# Patient Record
Sex: FEMALE | Race: WHITE | Employment: FULL TIME | URBAN - METROPOLITAN AREA
[De-identification: names, ages, dates, MRNs, and addresses within clinical notes are randomized per-mention and may not be internally consistent; named-entity substitution may affect disease eponyms.]

---

## 2018-05-04 ENCOUNTER — HOSPITAL ENCOUNTER (EMERGENCY)
Age: 53
Discharge: LEFT BEFORE DISCHARGE | End: 2018-05-04
Attending: EMERGENCY MEDICINE

## 2018-05-04 ENCOUNTER — APPOINTMENT (OUTPATIENT)
Dept: CT IMAGING | Age: 53
End: 2018-05-04

## 2018-05-04 VITALS
SYSTOLIC BLOOD PRESSURE: 146 MMHG | WEIGHT: 160 LBS | OXYGEN SATURATION: 99 % | TEMPERATURE: 98.2 F | RESPIRATION RATE: 16 BRPM | BODY MASS INDEX: 24.25 KG/M2 | HEART RATE: 84 BPM | HEIGHT: 68 IN | DIASTOLIC BLOOD PRESSURE: 70 MMHG

## 2018-05-04 DIAGNOSIS — F10.10 ALCOHOL ABUSE, DAILY USE: ICD-10-CM

## 2018-05-04 DIAGNOSIS — R11.2 NAUSEA AND VOMITING, INTRACTABILITY OF VOMITING NOT SPECIFIED, UNSPECIFIED VOMITING TYPE: ICD-10-CM

## 2018-05-04 DIAGNOSIS — R25.2 BILATERAL LEG CRAMPS: ICD-10-CM

## 2018-05-04 DIAGNOSIS — M47.812 OSTEOARTHRITIS OF CERVICAL SPINE, UNSPECIFIED SPINAL OSTEOARTHRITIS COMPLICATION STATUS: ICD-10-CM

## 2018-05-04 DIAGNOSIS — E87.1 HYPONATREMIA: ICD-10-CM

## 2018-05-04 DIAGNOSIS — S13.9XXA CERVICAL SPRAIN, INITIAL ENCOUNTER: Primary | ICD-10-CM

## 2018-05-04 DIAGNOSIS — R51.9 NONINTRACTABLE HEADACHE, UNSPECIFIED CHRONICITY PATTERN, UNSPECIFIED HEADACHE TYPE: ICD-10-CM

## 2018-05-04 LAB
ALBUMIN SERPL-MCNC: 4.8 G/DL (ref 3.5–5.2)
ALP BLD-CCNC: 121 U/L (ref 35–104)
ALT SERPL-CCNC: 77 U/L (ref 0–32)
ANION GAP SERPL CALCULATED.3IONS-SCNC: 19 MMOL/L (ref 7–16)
AST SERPL-CCNC: 75 U/L (ref 0–31)
BASOPHILS ABSOLUTE: 0.02 E9/L (ref 0–0.2)
BASOPHILS RELATIVE PERCENT: 0.2 % (ref 0–2)
BILIRUB SERPL-MCNC: 1 MG/DL (ref 0–1.2)
BUN BLDV-MCNC: 15 MG/DL (ref 6–20)
CALCIUM SERPL-MCNC: 10.4 MG/DL (ref 8.6–10.2)
CHLORIDE BLD-SCNC: 74 MMOL/L (ref 98–107)
CO2: 26 MMOL/L (ref 22–29)
CREAT SERPL-MCNC: 1 MG/DL (ref 0.5–1)
EKG ATRIAL RATE: 77 BPM
EKG P AXIS: -9 DEGREES
EKG P-R INTERVAL: 180 MS
EKG Q-T INTERVAL: 392 MS
EKG QRS DURATION: 90 MS
EKG QTC CALCULATION (BAZETT): 443 MS
EKG R AXIS: 9 DEGREES
EKG T AXIS: 2 DEGREES
EKG VENTRICULAR RATE: 77 BPM
EOSINOPHILS ABSOLUTE: 0.01 E9/L (ref 0.05–0.5)
EOSINOPHILS RELATIVE PERCENT: 0.1 % (ref 0–6)
GFR AFRICAN AMERICAN: >60
GFR NON-AFRICAN AMERICAN: 58 ML/MIN/1.73
GLUCOSE BLD-MCNC: 124 MG/DL (ref 74–109)
HCT VFR BLD CALC: 32.6 % (ref 34–48)
HEMOGLOBIN: 11.8 G/DL (ref 11.5–15.5)
IMMATURE GRANULOCYTES #: 0.13 E9/L
IMMATURE GRANULOCYTES %: 1 % (ref 0–5)
LIPASE: 42 U/L (ref 13–60)
LYMPHOCYTES ABSOLUTE: 0.49 E9/L (ref 1.5–4)
LYMPHOCYTES RELATIVE PERCENT: 3.9 % (ref 20–42)
MCH RBC QN AUTO: 30 PG (ref 26–35)
MCHC RBC AUTO-ENTMCNC: 36.2 % (ref 32–34.5)
MCV RBC AUTO: 83 FL (ref 80–99.9)
MONOCYTES ABSOLUTE: 1.02 E9/L (ref 0.1–0.95)
MONOCYTES RELATIVE PERCENT: 8.1 % (ref 2–12)
NEUTROPHILS ABSOLUTE: 10.95 E9/L (ref 1.8–7.3)
NEUTROPHILS RELATIVE PERCENT: 86.7 % (ref 43–80)
PDW BLD-RTO: 12 FL (ref 11.5–15)
PLATELET # BLD: 227 E9/L (ref 130–450)
PMV BLD AUTO: 8.8 FL (ref 7–12)
POTASSIUM SERPL-SCNC: 4.1 MMOL/L (ref 3.5–5)
RBC # BLD: 3.93 E12/L (ref 3.5–5.5)
SODIUM BLD-SCNC: 119 MMOL/L (ref 132–146)
TOTAL PROTEIN: 8.6 G/DL (ref 6.4–8.3)
WBC # BLD: 12.6 E9/L (ref 4.5–11.5)

## 2018-05-04 PROCEDURE — 96372 THER/PROPH/DIAG INJ SC/IM: CPT

## 2018-05-04 PROCEDURE — 99284 EMERGENCY DEPT VISIT MOD MDM: CPT

## 2018-05-04 PROCEDURE — 72125 CT NECK SPINE W/O DYE: CPT

## 2018-05-04 PROCEDURE — 96374 THER/PROPH/DIAG INJ IV PUSH: CPT

## 2018-05-04 PROCEDURE — 6360000002 HC RX W HCPCS: Performed by: NURSE PRACTITIONER

## 2018-05-04 PROCEDURE — 80053 COMPREHEN METABOLIC PANEL: CPT

## 2018-05-04 PROCEDURE — 83690 ASSAY OF LIPASE: CPT

## 2018-05-04 PROCEDURE — 2580000003 HC RX 258: Performed by: NURSE PRACTITIONER

## 2018-05-04 PROCEDURE — 36415 COLL VENOUS BLD VENIPUNCTURE: CPT

## 2018-05-04 PROCEDURE — 96375 TX/PRO/DX INJ NEW DRUG ADDON: CPT

## 2018-05-04 PROCEDURE — 70450 CT HEAD/BRAIN W/O DYE: CPT

## 2018-05-04 PROCEDURE — 85025 COMPLETE CBC W/AUTO DIFF WBC: CPT

## 2018-05-04 RX ORDER — THIAMINE HYDROCHLORIDE 100 MG/ML
100 INJECTION, SOLUTION INTRAMUSCULAR; INTRAVENOUS DAILY
Status: DISCONTINUED | OUTPATIENT
Start: 2018-05-04 | End: 2018-05-04 | Stop reason: HOSPADM

## 2018-05-04 RX ORDER — DIPHENHYDRAMINE HYDROCHLORIDE 50 MG/ML
25 INJECTION INTRAMUSCULAR; INTRAVENOUS ONCE
Status: COMPLETED | OUTPATIENT
Start: 2018-05-04 | End: 2018-05-04

## 2018-05-04 RX ORDER — 0.9 % SODIUM CHLORIDE 0.9 %
1000 INTRAVENOUS SOLUTION INTRAVENOUS ONCE
Status: COMPLETED | OUTPATIENT
Start: 2018-05-04 | End: 2018-05-04

## 2018-05-04 RX ORDER — DEXAMETHASONE SODIUM PHOSPHATE 10 MG/ML
10 INJECTION, SOLUTION INTRAMUSCULAR; INTRAVENOUS ONCE
Status: DISCONTINUED | OUTPATIENT
Start: 2018-05-04 | End: 2018-05-04

## 2018-05-04 RX ORDER — ORPHENADRINE CITRATE 30 MG/ML
60 INJECTION INTRAMUSCULAR; INTRAVENOUS ONCE
Status: COMPLETED | OUTPATIENT
Start: 2018-05-04 | End: 2018-05-04

## 2018-05-04 RX ORDER — ONDANSETRON 2 MG/ML
4 INJECTION INTRAMUSCULAR; INTRAVENOUS ONCE
Status: COMPLETED | OUTPATIENT
Start: 2018-05-04 | End: 2018-05-04

## 2018-05-04 RX ORDER — KETOROLAC TROMETHAMINE 30 MG/ML
30 INJECTION, SOLUTION INTRAMUSCULAR; INTRAVENOUS ONCE
Status: COMPLETED | OUTPATIENT
Start: 2018-05-04 | End: 2018-05-04

## 2018-05-04 RX ADMIN — KETOROLAC TROMETHAMINE 30 MG: 30 INJECTION, SOLUTION INTRAMUSCULAR at 18:05

## 2018-05-04 RX ADMIN — ORPHENADRINE CITRATE 60 MG: 30 INJECTION INTRAMUSCULAR; INTRAVENOUS at 18:05

## 2018-05-04 RX ADMIN — THIAMINE HYDROCHLORIDE 100 MG: 100 INJECTION, SOLUTION INTRAMUSCULAR; INTRAVENOUS at 19:10

## 2018-05-04 RX ADMIN — SODIUM CHLORIDE 1000 ML: 9 INJECTION, SOLUTION INTRAVENOUS at 17:02

## 2018-05-04 RX ADMIN — DIPHENHYDRAMINE HYDROCHLORIDE 25 MG: 50 INJECTION, SOLUTION INTRAMUSCULAR; INTRAVENOUS at 18:05

## 2018-05-04 RX ADMIN — SODIUM CHLORIDE 1000 ML: 9 INJECTION, SOLUTION INTRAVENOUS at 18:53

## 2018-05-04 RX ADMIN — ONDANSETRON 4 MG: 2 INJECTION INTRAMUSCULAR; INTRAVENOUS at 17:02

## 2018-05-04 ASSESSMENT — PAIN DESCRIPTION - FREQUENCY: FREQUENCY: CONTINUOUS

## 2018-05-04 ASSESSMENT — PAIN SCALES - GENERAL
PAINLEVEL_OUTOF10: 8
PAINLEVEL_OUTOF10: 10

## 2018-05-04 ASSESSMENT — PAIN DESCRIPTION - LOCATION: LOCATION: NECK

## 2018-05-04 ASSESSMENT — PAIN DESCRIPTION - DESCRIPTORS: DESCRIPTORS: ACHING;SHARP

## 2018-05-04 ASSESSMENT — PAIN DESCRIPTION - PAIN TYPE: TYPE: ACUTE PAIN

## 2018-10-29 ENCOUNTER — HOSPITAL ENCOUNTER (EMERGENCY)
Age: 53
Discharge: HOME | End: 2018-10-29

## 2018-10-29 ENCOUNTER — APPOINTMENT (OUTPATIENT)
Dept: GENERAL RADIOLOGY | Age: 53
End: 2018-10-29

## 2018-10-29 VITALS
OXYGEN SATURATION: 98 % | HEART RATE: 62 BPM | BODY MASS INDEX: 25.01 KG/M2 | SYSTOLIC BLOOD PRESSURE: 136 MMHG | WEIGHT: 165 LBS | DIASTOLIC BLOOD PRESSURE: 84 MMHG | TEMPERATURE: 98.3 F | HEIGHT: 68 IN | RESPIRATION RATE: 16 BRPM

## 2018-10-29 DIAGNOSIS — M79.645 PAIN OF FINGER OF LEFT HAND: Primary | ICD-10-CM

## 2018-10-29 PROCEDURE — 6360000002 HC RX W HCPCS: Performed by: PHYSICIAN ASSISTANT

## 2018-10-29 PROCEDURE — 96372 THER/PROPH/DIAG INJ SC/IM: CPT

## 2018-10-29 PROCEDURE — 99283 EMERGENCY DEPT VISIT LOW MDM: CPT

## 2018-10-29 PROCEDURE — 73130 X-RAY EXAM OF HAND: CPT

## 2018-10-29 PROCEDURE — 73110 X-RAY EXAM OF WRIST: CPT

## 2018-10-29 RX ORDER — KETOROLAC TROMETHAMINE 30 MG/ML
30 INJECTION, SOLUTION INTRAMUSCULAR; INTRAVENOUS ONCE
Status: COMPLETED | OUTPATIENT
Start: 2018-10-29 | End: 2018-10-29

## 2018-10-29 RX ORDER — NAPROXEN 500 MG/1
500 TABLET ORAL 2 TIMES DAILY
Qty: 14 TABLET | Refills: 0 | Status: ON HOLD | OUTPATIENT
Start: 2018-10-29 | End: 2022-10-30 | Stop reason: HOSPADM

## 2018-10-29 RX ADMIN — KETOROLAC TROMETHAMINE 30 MG: 30 INJECTION, SOLUTION INTRAMUSCULAR at 17:25

## 2018-10-29 ASSESSMENT — PAIN DESCRIPTION - FREQUENCY: FREQUENCY: CONTINUOUS

## 2018-10-29 ASSESSMENT — PAIN DESCRIPTION - LOCATION: LOCATION: FINGER (COMMENT WHICH ONE)

## 2018-10-29 ASSESSMENT — PAIN SCALES - GENERAL
PAINLEVEL_OUTOF10: 8
PAINLEVEL_OUTOF10: 8
PAINLEVEL_OUTOF10: 7

## 2018-10-29 ASSESSMENT — PAIN DESCRIPTION - ORIENTATION: ORIENTATION: RIGHT

## 2018-10-29 ASSESSMENT — PAIN DESCRIPTION - PAIN TYPE: TYPE: ACUTE PAIN

## 2018-10-29 ASSESSMENT — PAIN DESCRIPTION - DESCRIPTORS: DESCRIPTORS: ACHING

## 2018-10-30 NOTE — ED PROVIDER NOTES
outpatient management. Pt educated on need to return to ER if new or worsening symptoms. Pt told to follow-up with PCP. All questions answered. Pt agreeable to the plan. At this time the patient is without objective evidence of an acute process requiring hospitalization or inpatient management. They have remained hemodynamically stable throughout their entire ED visit and are stable for discharge with outpatient follow-up. The plan has been discussed in detail and they are aware of the specific conditions for emergent return, as well as the importance of follow-up. Counseling: The emergency provider has spoken with the patient and discussed todays results, in addition to providing specific details for the plan of care and counseling regarding the diagnosis and prognosis. Questions are answered at this time and they are agreeable with the plan. Assessment      1. Pain of finger of left hand      Plan   Discharge to home  Patient condition is good    New Medications     Discharge Medication List as of 10/29/2018  5:55 PM      START taking these medications    Details   naproxen (NAPROSYN) 500 MG tablet Take 1 tablet by mouth 2 times daily for 7 days, Disp-14 tablet, R-0Print           Electronically signed by Trisha Belle PA-C   DD: 10/29/18  **This report was transcribed using voice recognition software. Every effort was made to ensure accuracy; however, inadvertent computerized transcription errors may be present.   END OF ED PROVIDER NOTE        Christina Negron PA-C  10/29/18 6686  ATTENDING PROVIDER ATTESTATION:     Supervising Physician, on-site, available for consultation, non-participatory in the evaluation or care of this patient     Zach Atkinson DO  10/31/18 0728

## 2019-01-17 ENCOUNTER — TELEPHONE (OUTPATIENT)
Dept: NON INVASIVE DIAGNOSTICS | Age: 54
End: 2019-01-17

## 2019-01-18 ENCOUNTER — TELEPHONE (OUTPATIENT)
Dept: ORTHOPEDIC SURGERY | Age: 54
End: 2019-01-18

## 2019-01-18 DIAGNOSIS — S62.316A CLOSED DISPLACED FRACTURE OF BASE OF FIFTH METACARPAL BONE OF RIGHT HAND, INITIAL ENCOUNTER: Primary | ICD-10-CM

## 2019-03-05 ENCOUNTER — OFFICE VISIT (OUTPATIENT)
Dept: NON INVASIVE DIAGNOSTICS | Age: 54
End: 2019-03-05
Payer: COMMERCIAL

## 2019-03-05 VITALS
BODY MASS INDEX: 26.07 KG/M2 | HEIGHT: 68 IN | SYSTOLIC BLOOD PRESSURE: 136 MMHG | RESPIRATION RATE: 16 BRPM | WEIGHT: 172 LBS | DIASTOLIC BLOOD PRESSURE: 82 MMHG | HEART RATE: 72 BPM

## 2019-03-05 DIAGNOSIS — I20.8 STABLE ANGINA PECTORIS (HCC): ICD-10-CM

## 2019-03-05 DIAGNOSIS — R06.02 SHORTNESS OF BREATH: ICD-10-CM

## 2019-03-05 DIAGNOSIS — R55 SYNCOPE AND COLLAPSE: Primary | ICD-10-CM

## 2019-03-05 PROCEDURE — G8419 CALC BMI OUT NRM PARAM NOF/U: HCPCS | Performed by: INTERNAL MEDICINE

## 2019-03-05 PROCEDURE — 93000 ELECTROCARDIOGRAM COMPLETE: CPT | Performed by: INTERNAL MEDICINE

## 2019-03-05 PROCEDURE — 3017F COLORECTAL CA SCREEN DOC REV: CPT | Performed by: INTERNAL MEDICINE

## 2019-03-05 PROCEDURE — G8427 DOCREV CUR MEDS BY ELIG CLIN: HCPCS | Performed by: INTERNAL MEDICINE

## 2019-03-05 PROCEDURE — 99245 OFF/OP CONSLTJ NEW/EST HI 55: CPT | Performed by: INTERNAL MEDICINE

## 2019-03-05 PROCEDURE — G8484 FLU IMMUNIZE NO ADMIN: HCPCS | Performed by: INTERNAL MEDICINE

## 2019-03-05 RX ORDER — ALBUTEROL SULFATE 90 UG/1
2 AEROSOL, METERED RESPIRATORY (INHALATION) EVERY 6 HOURS PRN
COMMUNITY

## 2019-03-05 RX ORDER — LOSARTAN POTASSIUM AND HYDROCHLOROTHIAZIDE 25; 100 MG/1; MG/1
1 TABLET ORAL DAILY
Refills: 0 | COMMUNITY
Start: 2019-01-10

## 2019-03-05 RX ORDER — BUDESONIDE AND FORMOTEROL FUMARATE DIHYDRATE 160; 4.5 UG/1; UG/1
2 AEROSOL RESPIRATORY (INHALATION) 2 TIMES DAILY
COMMUNITY
End: 2021-06-24 | Stop reason: ALTCHOICE

## 2019-03-05 RX ORDER — FLUTICASONE PROPIONATE 50 MCG
1 SPRAY, SUSPENSION (ML) NASAL DAILY
COMMUNITY

## 2019-03-06 ENCOUNTER — TELEPHONE (OUTPATIENT)
Dept: NON INVASIVE DIAGNOSTICS | Age: 54
End: 2019-03-06

## 2019-03-14 ENCOUNTER — HOSPITAL ENCOUNTER (OUTPATIENT)
Dept: CARDIOLOGY | Age: 54
Discharge: HOME | End: 2019-03-14
Payer: COMMERCIAL

## 2019-03-14 VITALS
HEIGHT: 68 IN | HEART RATE: 93 BPM | BODY MASS INDEX: 25.01 KG/M2 | DIASTOLIC BLOOD PRESSURE: 70 MMHG | SYSTOLIC BLOOD PRESSURE: 132 MMHG | WEIGHT: 165 LBS

## 2019-03-14 DIAGNOSIS — I20.8 STABLE ANGINA PECTORIS (HCC): ICD-10-CM

## 2019-03-14 DIAGNOSIS — R06.02 SHORTNESS OF BREATH: ICD-10-CM

## 2019-03-14 LAB
LV EF: 60 %
LVEF MODALITY: NORMAL

## 2019-03-14 PROCEDURE — 2580000003 HC RX 258: Performed by: INTERNAL MEDICINE

## 2019-03-14 PROCEDURE — 6360000002 HC RX W HCPCS: Performed by: INTERNAL MEDICINE

## 2019-03-14 PROCEDURE — A9500 TC99M SESTAMIBI: HCPCS | Performed by: INTERNAL MEDICINE

## 2019-03-14 PROCEDURE — 93306 TTE W/DOPPLER COMPLETE: CPT

## 2019-03-14 PROCEDURE — 93017 CV STRESS TEST TRACING ONLY: CPT

## 2019-03-14 PROCEDURE — 3430000000 HC RX DIAGNOSTIC RADIOPHARMACEUTICAL: Performed by: INTERNAL MEDICINE

## 2019-03-14 PROCEDURE — 78452 HT MUSCLE IMAGE SPECT MULT: CPT

## 2019-03-14 RX ORDER — SODIUM CHLORIDE 0.9 % (FLUSH) 0.9 %
10 SYRINGE (ML) INJECTION PRN
Status: DISCONTINUED | OUTPATIENT
Start: 2019-03-14 | End: 2019-03-15 | Stop reason: HOSPADM

## 2019-03-14 RX ADMIN — Medication 9.4 MILLICURIE: at 07:19

## 2019-03-14 RX ADMIN — Medication 10 ML: at 09:41

## 2019-03-14 RX ADMIN — Medication 28.8 MILLICURIE: at 09:40

## 2019-03-14 RX ADMIN — Medication 10 ML: at 07:19

## 2019-03-14 RX ADMIN — REGADENOSON 0.4 MG: 0.08 INJECTION, SOLUTION INTRAVENOUS at 09:40

## 2019-03-14 RX ADMIN — Medication 10 ML: at 09:40

## 2019-03-15 ENCOUNTER — TELEPHONE (OUTPATIENT)
Dept: NON INVASIVE DIAGNOSTICS | Age: 54
End: 2019-03-15

## 2019-04-08 ENCOUNTER — TELEPHONE (OUTPATIENT)
Dept: NON INVASIVE DIAGNOSTICS | Age: 54
End: 2019-04-08

## 2019-04-08 DIAGNOSIS — R55 SYNCOPE AND COLLAPSE: ICD-10-CM

## 2019-04-08 NOTE — TELEPHONE ENCOUNTER
----- Message from Andreas Kirk DO sent at 4/8/2019 10:28 AM EDT -----  Please let her know that her cardiac monitor is normal.  The symptoms that she had on the monitor did not correlate with any abnormal rhythms. Office f/u in 4-6 weeks.     ALK

## 2019-04-26 ENCOUNTER — TELEPHONE (OUTPATIENT)
Dept: ORTHOPEDIC SURGERY | Age: 54
End: 2019-04-26

## 2019-04-26 DIAGNOSIS — S62.316A CLOSED DISPLACED FRACTURE OF BASE OF FIFTH METACARPAL BONE OF RIGHT HAND, INITIAL ENCOUNTER: Primary | ICD-10-CM

## 2019-10-03 ENCOUNTER — HOSPITAL ENCOUNTER (EMERGENCY)
Age: 54
Discharge: HOME | End: 2019-10-03

## 2019-10-03 ENCOUNTER — APPOINTMENT (OUTPATIENT)
Dept: GENERAL RADIOLOGY | Age: 54
End: 2019-10-03

## 2019-10-03 VITALS
HEIGHT: 68 IN | SYSTOLIC BLOOD PRESSURE: 150 MMHG | RESPIRATION RATE: 16 BRPM | HEART RATE: 75 BPM | WEIGHT: 165 LBS | DIASTOLIC BLOOD PRESSURE: 92 MMHG | OXYGEN SATURATION: 97 % | BODY MASS INDEX: 25.01 KG/M2 | TEMPERATURE: 98.2 F

## 2019-10-03 DIAGNOSIS — S92.352A CLOSED DISPLACED FRACTURE OF FIFTH METATARSAL BONE OF LEFT FOOT, INITIAL ENCOUNTER: Primary | ICD-10-CM

## 2019-10-03 PROCEDURE — 29515 APPLICATION SHORT LEG SPLINT: CPT

## 2019-10-03 PROCEDURE — 99283 EMERGENCY DEPT VISIT LOW MDM: CPT

## 2019-10-03 PROCEDURE — 73630 X-RAY EXAM OF FOOT: CPT

## 2019-10-03 RX ORDER — HYDROCODONE BITARTRATE AND ACETAMINOPHEN 5; 325 MG/1; MG/1
1 TABLET ORAL EVERY 6 HOURS PRN
Qty: 12 TABLET | Refills: 0 | Status: SHIPPED | OUTPATIENT
Start: 2019-10-03 | End: 2019-10-06

## 2019-10-03 ASSESSMENT — PAIN DESCRIPTION - ORIENTATION: ORIENTATION: LEFT

## 2019-10-03 ASSESSMENT — PAIN DESCRIPTION - PAIN TYPE: TYPE: ACUTE PAIN

## 2019-10-03 ASSESSMENT — PAIN DESCRIPTION - PROGRESSION: CLINICAL_PROGRESSION: NOT CHANGED

## 2019-10-03 ASSESSMENT — PAIN DESCRIPTION - DESCRIPTORS: DESCRIPTORS: ACHING;CONSTANT;THROBBING

## 2019-10-03 ASSESSMENT — PAIN DESCRIPTION - ONSET: ONSET: SUDDEN

## 2019-10-03 ASSESSMENT — PAIN SCALES - GENERAL: PAINLEVEL_OUTOF10: 8

## 2019-10-03 ASSESSMENT — PAIN DESCRIPTION - LOCATION: LOCATION: FOOT

## 2019-10-03 ASSESSMENT — PAIN DESCRIPTION - FREQUENCY: FREQUENCY: CONTINUOUS

## 2021-06-24 ENCOUNTER — HOSPITAL ENCOUNTER (EMERGENCY)
Age: 56
Discharge: HOME | End: 2021-06-24
Attending: EMERGENCY MEDICINE
Payer: MEDICARE

## 2021-06-24 ENCOUNTER — APPOINTMENT (OUTPATIENT)
Dept: GENERAL RADIOLOGY | Age: 56
End: 2021-06-24
Payer: MEDICARE

## 2021-06-24 VITALS
BODY MASS INDEX: 25.01 KG/M2 | WEIGHT: 165 LBS | OXYGEN SATURATION: 98 % | DIASTOLIC BLOOD PRESSURE: 70 MMHG | TEMPERATURE: 97.7 F | HEIGHT: 68 IN | RESPIRATION RATE: 16 BRPM | SYSTOLIC BLOOD PRESSURE: 162 MMHG | HEART RATE: 67 BPM

## 2021-06-24 DIAGNOSIS — M54.9 UPPER BACK PAIN: Primary | ICD-10-CM

## 2021-06-24 PROCEDURE — 99283 EMERGENCY DEPT VISIT LOW MDM: CPT

## 2021-06-24 PROCEDURE — 6360000002 HC RX W HCPCS: Performed by: EMERGENCY MEDICINE

## 2021-06-24 PROCEDURE — 71046 X-RAY EXAM CHEST 2 VIEWS: CPT

## 2021-06-24 PROCEDURE — 72072 X-RAY EXAM THORAC SPINE 3VWS: CPT

## 2021-06-24 PROCEDURE — 96372 THER/PROPH/DIAG INJ SC/IM: CPT

## 2021-06-24 RX ORDER — METHOCARBAMOL 500 MG/1
500 TABLET, FILM COATED ORAL 4 TIMES DAILY
Qty: 40 TABLET | Refills: 0 | Status: SHIPPED | OUTPATIENT
Start: 2021-06-24 | End: 2021-07-04

## 2021-06-24 RX ORDER — LIDOCAINE 50 MG/G
1 PATCH TOPICAL DAILY
Qty: 10 PATCH | Refills: 0 | Status: SHIPPED | OUTPATIENT
Start: 2021-06-24 | End: 2021-07-04

## 2021-06-24 RX ORDER — KETOROLAC TROMETHAMINE 30 MG/ML
30 INJECTION, SOLUTION INTRAMUSCULAR; INTRAVENOUS ONCE
Status: COMPLETED | OUTPATIENT
Start: 2021-06-24 | End: 2021-06-24

## 2021-06-24 RX ADMIN — KETOROLAC TROMETHAMINE 30 MG: 30 INJECTION, SOLUTION INTRAMUSCULAR; INTRAVENOUS at 20:27

## 2021-06-24 ASSESSMENT — PAIN SCALES - GENERAL: PAINLEVEL_OUTOF10: 8

## 2021-06-24 ASSESSMENT — PAIN DESCRIPTION - FREQUENCY: FREQUENCY: CONTINUOUS

## 2021-06-24 ASSESSMENT — ENCOUNTER SYMPTOMS
ABDOMINAL PAIN: 0
SHORTNESS OF BREATH: 0
COUGH: 0
BACK PAIN: 1

## 2021-06-24 ASSESSMENT — PAIN DESCRIPTION - DESCRIPTORS: DESCRIPTORS: ACHING

## 2021-06-24 ASSESSMENT — PAIN DESCRIPTION - LOCATION: LOCATION: BACK

## 2021-06-24 ASSESSMENT — PAIN DESCRIPTION - PROGRESSION: CLINICAL_PROGRESSION: GRADUALLY WORSENING

## 2021-06-24 ASSESSMENT — PAIN DESCRIPTION - PAIN TYPE: TYPE: ACUTE PAIN

## 2021-06-24 ASSESSMENT — PAIN DESCRIPTION - ORIENTATION: ORIENTATION: MID

## 2021-06-24 ASSESSMENT — PAIN DESCRIPTION - ONSET: ONSET: ON-GOING

## 2021-06-24 NOTE — LETTER
[address] [facility] Emergency Department  [id number] [facility] [address]  Phone: [phone]               June 24, 2021    Patient: Marc Godoy   YOB: 1965   Date of Visit: 6/24/2021       To Whom It May Concern:    Hoa Castaneda was seen and treated in our emergency department on 6/24/2021. She may return to work on 6/27/21.       Sincerely,       Lady Haley RN

## 2021-06-25 NOTE — ED NOTES
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

## 2021-06-25 NOTE — ED PROVIDER NOTES
This is a 60-year-old female with a past medical history of asthma and coronary artery disease presents to the ED for evaluation of back pain. Patient states that 1 week ago she was lifting heavy boxes at her job at [employer] developed immediate pain into her right trapezius right upper back wraparound to her chest.  Patient states this pain makes it difficult for her to raise her right arm. Patient states she has been taking ibuprofen which does seem to improve the pain moderately. Patient denies any other traumas or falls. Patient denies any shortness of breath. Patient states that the pain is making it quite difficult for her to work. Patient has any numbness tingling loss of bowel or bladder incontinence or saddle anesthesia. The history is provided by the patient. No  was used. Review of Systems   Constitutional: Negative for fever. HENT: Negative for congestion. Eyes: Negative for visual disturbance. Respiratory: Negative for cough and shortness of breath. Cardiovascular: Negative for leg swelling. Gastrointestinal: Negative for abdominal pain. Endocrine: Negative for polyuria. Musculoskeletal: Positive for back pain. Skin: Negative for rash. Allergic/Immunologic: Negative for immunocompromised state. Neurological: Negative for headaches. Hematological: Does not bruise/bleed easily. Psychiatric/Behavioral: Negative for confusion. Physical Exam  Vitals and nursing note reviewed. Constitutional:       General: She is not in acute distress. Appearance: She is well-developed. HENT:      Head: Normocephalic and atraumatic. Mouth/Throat:      Mouth: Mucous membranes are moist.   Eyes:      Extraocular Movements: Extraocular movements intact. Pupils: Pupils are equal, round, and reactive to light. Neck:      Vascular: No JVD. Cardiovascular:      Rate and Rhythm: Normal rate and regular rhythm.       Heart sounds: No murmur Ankle surgery (Right, ); Leg Tendon Surgery;  section; Endometrial ablation (2014); and Finger surgery (Right, 2017). Social History:  reports that she quit smoking about 9 years ago. Her smoking use included cigarettes. She has a 12.00 pack-year smoking history. She has never used smokeless tobacco. She reports current alcohol use of about 42.0 standard drinks of alcohol per week. She reports that she does not use drugs. Family History: family history includes Asthma in her father and mother; Diabetes in her father; High Blood Pressure in her father. The patients home medications have been reviewed. Allergies: Tape [adhesive tape] and Pcn [penicillins]    -------------------------------------------------- RESULTS -------------------------------------------------  Labs:  No results found for this visit on 21. Radiology:  XR THORACIC SPINE (3 VIEWS)   Final Result   No acute bony pathology. Moderate multilevel degenerative changes. XR CHEST (2 VW)   Final Result   Focal atelectasis versus early infiltrates, medial right lung base.             ------------------------- NURSING NOTES AND VITALS REVIEWED ---------------------------  Date / Time Roomed:  2021  8:00 PM  ED Bed Assignment:  LARA/LARA    The nursing notes within the ED encounter and vital signs as below have been reviewed. BP (!) 162/70   Pulse 67   Temp 97.7 °F (36.5 °C) (Temporal)   Resp 16   Ht 5' 8\" (1.727 m)   Wt 165 lb (74.8 kg)   SpO2 98%   BMI 25.09 kg/m²   Oxygen Saturation Interpretation: Normal      ------------------------------------------ PROGRESS NOTES ------------------------------------------  8:29 PM EDT  I have spoken with the patient and discussed todays results, in addition to providing specific details for the plan of care and counseling regarding the diagnosis and prognosis. Their questions are answered at this time and they are agreeable with the plan.  I discussed at length with them reasons for immediate return here for re evaluation. They will followup with their PCP.      --------------------------------- ADDITIONAL PROVIDER NOTES ---------------------------------  At this time the patient is without objective evidence of an acute process requiring hospitalization or inpatient management. They have remained hemodynamically stable throughout their entire ED visit and are stable for discharge with outpatient follow-up. The plan has been discussed in detail and they are aware of the specific conditions for emergent return, as well as the importance of follow-up. Discharge Medication List as of 6/24/2021  9:40 PM      START taking these medications    Details   methocarbamol (ROBAXIN) 500 MG tablet Take 1 tablet by mouth 4 times daily for 10 days, Disp-40 tablet, R-0Print      lidocaine (LIDODERM) 5 % Place 1 patch onto the skin daily for 10 days 12 hours on, 12 hours off., Disp-10 patch, R-0Print             Diagnosis:  1. Upper back pain        Disposition:  Patient's disposition: Discharge to home  Patient's condition is stable.       Alexandre Selby DO  06/25/21 1700

## 2021-07-27 ENCOUNTER — APPOINTMENT (OUTPATIENT)
Dept: CT IMAGING | Age: 56
End: 2021-07-27
Payer: MEDICARE

## 2021-07-27 ENCOUNTER — APPOINTMENT (OUTPATIENT)
Dept: GENERAL RADIOLOGY | Age: 56
End: 2021-07-27
Payer: MEDICARE

## 2021-07-27 ENCOUNTER — HOSPITAL ENCOUNTER (EMERGENCY)
Age: 56
Discharge: HOME | End: 2021-07-27
Attending: EMERGENCY MEDICINE
Payer: MEDICARE

## 2021-07-27 VITALS
TEMPERATURE: 98.2 F | OXYGEN SATURATION: 98 % | HEART RATE: 67 BPM | HEIGHT: 68 IN | BODY MASS INDEX: 29.7 KG/M2 | WEIGHT: 196 LBS | DIASTOLIC BLOOD PRESSURE: 92 MMHG | SYSTOLIC BLOOD PRESSURE: 179 MMHG | RESPIRATION RATE: 18 BRPM

## 2021-07-27 DIAGNOSIS — K29.00 ACUTE GASTRITIS WITHOUT HEMORRHAGE, UNSPECIFIED GASTRITIS TYPE: ICD-10-CM

## 2021-07-27 DIAGNOSIS — J18.9 PNEUMONIA OF BOTH LUNGS DUE TO INFECTIOUS ORGANISM, UNSPECIFIED PART OF LUNG: Primary | ICD-10-CM

## 2021-07-27 DIAGNOSIS — E87.1 HYPONATREMIA: ICD-10-CM

## 2021-07-27 LAB
ALBUMIN SERPL-MCNC: 4.2 G/DL (ref 3.5–5.2)
ALP BLD-CCNC: 132 U/L (ref 35–104)
ALT SERPL-CCNC: 29 U/L (ref 0–32)
ANION GAP SERPL CALCULATED.3IONS-SCNC: 11 MMOL/L (ref 7–16)
ANION GAP SERPL CALCULATED.3IONS-SCNC: 11 MMOL/L (ref 7–16)
AST SERPL-CCNC: 44 U/L (ref 0–31)
BASOPHILS ABSOLUTE: 0.06 E9/L (ref 0–0.2)
BASOPHILS RELATIVE PERCENT: 1.3 % (ref 0–2)
BILIRUB SERPL-MCNC: 0.3 MG/DL (ref 0–1.2)
BILIRUBIN URINE: NEGATIVE
BLOOD, URINE: NEGATIVE
BUN BLDV-MCNC: 12 MG/DL (ref 6–20)
BUN BLDV-MCNC: 14 MG/DL (ref 6–20)
CALCIUM SERPL-MCNC: 8.6 MG/DL (ref 8.6–10.2)
CALCIUM SERPL-MCNC: 9.5 MG/DL (ref 8.6–10.2)
CHLORIDE BLD-SCNC: 80 MMOL/L (ref 98–107)
CHLORIDE BLD-SCNC: 86 MMOL/L (ref 98–107)
CHLORIDE URINE RANDOM: <20 MMOL/L
CLARITY: CLEAR
CO2: 22 MMOL/L (ref 22–29)
CO2: 25 MMOL/L (ref 22–29)
COLOR: YELLOW
CREAT SERPL-MCNC: 0.7 MG/DL (ref 0.5–1)
CREAT SERPL-MCNC: 0.8 MG/DL (ref 0.5–1)
CREATININE URINE: 31 MG/DL (ref 29–226)
EOSINOPHILS ABSOLUTE: 0.07 E9/L (ref 0.05–0.5)
EOSINOPHILS RELATIVE PERCENT: 1.5 % (ref 0–6)
GFR AFRICAN AMERICAN: >60
GFR AFRICAN AMERICAN: >60
GFR NON-AFRICAN AMERICAN: >60 ML/MIN/1.73
GFR NON-AFRICAN AMERICAN: >60 ML/MIN/1.73
GLUCOSE BLD-MCNC: 74 MG/DL (ref 74–99)
GLUCOSE BLD-MCNC: 83 MG/DL (ref 74–99)
GLUCOSE URINE: NEGATIVE MG/DL
HCT VFR BLD CALC: 31.2 % (ref 34–48)
HEMOGLOBIN: 10.8 G/DL (ref 11.5–15.5)
IMMATURE GRANULOCYTES #: 0.02 E9/L
IMMATURE GRANULOCYTES %: 0.4 % (ref 0–5)
KETONES, URINE: NEGATIVE MG/DL
LACTIC ACID: 1.3 MMOL/L (ref 0.5–2.2)
LEUKOCYTE ESTERASE, URINE: NEGATIVE
LIPASE: 27 U/L (ref 13–60)
LYMPHOCYTES ABSOLUTE: 0.83 E9/L (ref 1.5–4)
LYMPHOCYTES RELATIVE PERCENT: 18.1 % (ref 20–42)
MCH RBC QN AUTO: 27.4 PG (ref 26–35)
MCHC RBC AUTO-ENTMCNC: 34.6 % (ref 32–34.5)
MCV RBC AUTO: 79.2 FL (ref 80–99.9)
MONOCYTES ABSOLUTE: 0.5 E9/L (ref 0.1–0.95)
MONOCYTES RELATIVE PERCENT: 10.9 % (ref 2–12)
NEUTROPHILS ABSOLUTE: 3.1 E9/L (ref 1.8–7.3)
NEUTROPHILS RELATIVE PERCENT: 67.8 % (ref 43–80)
NITRITE, URINE: NEGATIVE
PDW BLD-RTO: 12 FL (ref 11.5–15)
PH UA: 6 (ref 5–9)
PLATELET # BLD: 321 E9/L (ref 130–450)
PMV BLD AUTO: 8.4 FL (ref 7–12)
POTASSIUM SERPL-SCNC: 3.3 MMOL/L (ref 3.5–5)
POTASSIUM SERPL-SCNC: 3.6 MMOL/L (ref 3.5–5)
POTASSIUM, UR: 10.3 MMOL/L
PROTEIN UA: NEGATIVE MG/DL
RBC # BLD: 3.94 E12/L (ref 3.5–5.5)
SODIUM BLD-SCNC: 116 MMOL/L (ref 132–146)
SODIUM BLD-SCNC: 119 MMOL/L (ref 132–146)
SODIUM URINE: <20 MMOL/L
SPECIFIC GRAVITY UA: 1.01 (ref 1–1.03)
TOTAL PROTEIN: 7.5 G/DL (ref 6.4–8.3)
TROPONIN, HIGH SENSITIVITY: 7 NG/L (ref 0–9)
UROBILINOGEN, URINE: 0.2 E.U./DL
WBC # BLD: 4.6 E9/L (ref 4.5–11.5)

## 2021-07-27 PROCEDURE — 82570 ASSAY OF URINE CREATININE: CPT

## 2021-07-27 PROCEDURE — 74176 CT ABD & PELVIS W/O CONTRAST: CPT

## 2021-07-27 PROCEDURE — 84484 ASSAY OF TROPONIN QUANT: CPT

## 2021-07-27 PROCEDURE — 6370000000 HC RX 637 (ALT 250 FOR IP): Performed by: EMERGENCY MEDICINE

## 2021-07-27 PROCEDURE — 83690 ASSAY OF LIPASE: CPT

## 2021-07-27 PROCEDURE — 80053 COMPREHEN METABOLIC PANEL: CPT

## 2021-07-27 PROCEDURE — 2580000003 HC RX 258: Performed by: EMERGENCY MEDICINE

## 2021-07-27 PROCEDURE — 84300 ASSAY OF URINE SODIUM: CPT

## 2021-07-27 PROCEDURE — 96374 THER/PROPH/DIAG INJ IV PUSH: CPT

## 2021-07-27 PROCEDURE — 6360000002 HC RX W HCPCS: Performed by: EMERGENCY MEDICINE

## 2021-07-27 PROCEDURE — C9113 INJ PANTOPRAZOLE SODIUM, VIA: HCPCS | Performed by: EMERGENCY MEDICINE

## 2021-07-27 PROCEDURE — 99284 EMERGENCY DEPT VISIT MOD MDM: CPT

## 2021-07-27 PROCEDURE — 80048 BASIC METABOLIC PNL TOTAL CA: CPT

## 2021-07-27 PROCEDURE — 84133 ASSAY OF URINE POTASSIUM: CPT

## 2021-07-27 PROCEDURE — 81003 URINALYSIS AUTO W/O SCOPE: CPT

## 2021-07-27 PROCEDURE — 87088 URINE BACTERIA CULTURE: CPT

## 2021-07-27 PROCEDURE — 85025 COMPLETE CBC W/AUTO DIFF WBC: CPT

## 2021-07-27 PROCEDURE — 83605 ASSAY OF LACTIC ACID: CPT

## 2021-07-27 PROCEDURE — 82436 ASSAY OF URINE CHLORIDE: CPT

## 2021-07-27 PROCEDURE — 71045 X-RAY EXAM CHEST 1 VIEW: CPT

## 2021-07-27 RX ORDER — OMEPRAZOLE 20 MG/1
20 CAPSULE, DELAYED RELEASE ORAL
Qty: 30 CAPSULE | Refills: 0 | Status: SHIPPED | OUTPATIENT
Start: 2021-07-27

## 2021-07-27 RX ORDER — PANTOPRAZOLE SODIUM 40 MG/10ML
40 INJECTION, POWDER, LYOPHILIZED, FOR SOLUTION INTRAVENOUS ONCE
Status: COMPLETED | OUTPATIENT
Start: 2021-07-27 | End: 2021-07-27

## 2021-07-27 RX ORDER — CEFDINIR 300 MG/1
300 CAPSULE ORAL ONCE
Status: COMPLETED | OUTPATIENT
Start: 2021-07-27 | End: 2021-07-27

## 2021-07-27 RX ORDER — SODIUM CHLORIDE 9 MG/ML
10 INJECTION INTRAVENOUS ONCE
Status: COMPLETED | OUTPATIENT
Start: 2021-07-27 | End: 2021-07-27

## 2021-07-27 RX ORDER — 0.9 % SODIUM CHLORIDE 0.9 %
1000 INTRAVENOUS SOLUTION INTRAVENOUS ONCE
Status: COMPLETED | OUTPATIENT
Start: 2021-07-27 | End: 2021-07-27

## 2021-07-27 RX ORDER — DOXYCYCLINE HYCLATE 100 MG
100 TABLET ORAL 2 TIMES DAILY
Qty: 20 TABLET | Refills: 0 | Status: SHIPPED | OUTPATIENT
Start: 2021-07-27 | End: 2021-08-06

## 2021-07-27 RX ORDER — DOXYCYCLINE HYCLATE 100 MG/1
100 CAPSULE ORAL ONCE
Status: COMPLETED | OUTPATIENT
Start: 2021-07-27 | End: 2021-07-27

## 2021-07-27 RX ORDER — CEFDINIR 300 MG/1
300 CAPSULE ORAL 2 TIMES DAILY
Qty: 20 CAPSULE | Refills: 0 | Status: SHIPPED | OUTPATIENT
Start: 2021-07-27 | End: 2021-08-06

## 2021-07-27 RX ADMIN — SODIUM CHLORIDE, PRESERVATIVE FREE 10 ML: 5 INJECTION INTRAVENOUS at 16:44

## 2021-07-27 RX ADMIN — PANTOPRAZOLE SODIUM 40 MG: 40 INJECTION, POWDER, FOR SOLUTION INTRAVENOUS at 16:44

## 2021-07-27 RX ADMIN — ALUMINUM HYDROXIDE, MAGNESIUM HYDROXIDE, AND SIMETHICONE: 200; 200; 20 SUSPENSION ORAL at 14:33

## 2021-07-27 RX ADMIN — CEFDINIR 300 MG: 300 CAPSULE ORAL at 20:26

## 2021-07-27 RX ADMIN — DOXYCYCLINE HYCLATE 100 MG: 100 CAPSULE ORAL at 20:26

## 2021-07-27 RX ADMIN — SODIUM CHLORIDE 1000 ML: 9 INJECTION, SOLUTION INTRAVENOUS at 14:33

## 2021-07-27 ASSESSMENT — PAIN SCALES - GENERAL: PAINLEVEL_OUTOF10: 8

## 2021-07-27 ASSESSMENT — PAIN DESCRIPTION - DIRECTION: RADIATING_TOWARDS: BACK BETWEEN SHOULDER BLADES

## 2021-07-27 ASSESSMENT — PAIN DESCRIPTION - PAIN TYPE: TYPE: ACUTE PAIN

## 2021-07-29 LAB — URINE CULTURE, ROUTINE: NORMAL

## 2021-08-06 NOTE — ED PROVIDER NOTES
Department of Emergency Medicine   ED  Provider Note  Admit Date/RoomTime: 2021  1:52 PM  ED Room: LARA/LARA          History of Present Illness:  21, Time: 8:31 AM EDT  Chief Complaint   Patient presents with    Abdominal Pain     epigastric, radiates to back for a couple months. Pt states it is intermittent. +N/V, constipation                Jovita Ann is a 55 y.o. female presenting to the ED for abdominal pain, beginning months ago. The complaint has been intermittent, moderate in severity, and worsened by eating sometimes. Patient admits to months of intermittent upper abdominal pain that is started to radiate into her back. She states it sometimes feels sharp sometimes burning sometimes aching. She states that sometimes related with food. She has had decreased appetite because of this. She states it has been like this for a long time. She admits to intermittent nausea with rare episodes of vomiting. She states it's normal for her not to have bowel movements often because she does not eat much. She denies urinary symptoms. Denies chest pain or dyspnea. Denies fever or chills. Denies recent trauma or injury. Review of Systems:   Pertinent positives and negatives are stated within HPI, all other systems reviewed and are negative.        --------------------------------------------- PAST HISTORY ---------------------------------------------  Past Medical History:  has a past medical history of Asthma, CAD (coronary artery disease), Environmental allergies, Fracture, Heart palpitations, Hypertension, MI (myocardial infarction) ([facility] [address] and Syncope and collapse. Past Surgical History:  has a past surgical history that includes Ankle surgery (Right, ); Leg Tendon Surgery;  section; Endometrial ablation (); and Finger surgery (Right, 2017). Social History:  reports that she quit smoking about 10 years ago. Her smoking use included cigarettes.  She has a 12.00 pack-year smoking history. She has never used smokeless tobacco. She reports current alcohol use of about 42.0 standard drinks of alcohol per week. She reports that she does not use drugs. Family History: family history includes Asthma in her father and mother; Diabetes in her father; High Blood Pressure in her father. . Unless otherwise noted, family history is non contributory    The patients home medications have been reviewed. Allergies: Tape [adhesive tape] and Pcn [penicillins]        ---------------------------------------------------PHYSICAL EXAM--------------------------------------    Constitutional/General: Alert and oriented x3  Head: Normocephalic and atraumatic  Eyes: PERRL, EOMI, sclera non icteric  Mouth: Oropharynx clear, handling secretions, no trismus, no asymmetry of the posterior oropharynx or uvular edema  Neck: Supple, full ROM, no stridor, no meningeal signs  Respiratory: Lungs clear to auscultation bilaterally,Not in respiratory distress  Cardiovascular:  Regular rate. Regular rhythm. 2+ distal pulses. Equal extremity pulses. Chest: No chest wall tenderness  GI:  Abdomen Soft, epigastric tender, Non distended. No rebound, guarding, or rigidity. No pulsatile mass. Bilateral flank tenderness. Musculoskeletal: Moves all extremities x 4. Warm and well perfused, no clubbing, cyanosis, or edema. Capillary refill <3 seconds  Integument: skin warm and dry. No rashes. Neurologic: GCS 15, no focal deficits, symmetric strength 5/5 in the upper and lower extremities bilaterally  Psychiatric: Normal Affect        -------------------------------------------------- RESULTS -------------------------------------------------  I have personally reviewed all laboratory and imaging results for this patient. Results are listed below.      LABS: (Lab results interpreted by me)  Results for orders placed or performed during the hospital encounter of 07/27/21   Culture, Urine    Specimen: Urine, clean catch   Result Value Ref Range    Urine Culture, Routine Growth not present    CBC Auto Differential   Result Value Ref Range    WBC 4.6 4.5 - 11.5 E9/L    RBC 3.94 3.50 - 5.50 E12/L    Hemoglobin 10.8 (L) 11.5 - 15.5 g/dL    Hematocrit 31.2 (L) 34.0 - 48.0 %    MCV 79.2 (L) 80.0 - 99.9 fL    MCH 27.4 26.0 - 35.0 pg    MCHC 34.6 (H) 32.0 - 34.5 %    RDW 12.0 11.5 - 15.0 fL    Platelets 724 206 - 310 E9/L    MPV 8.4 7.0 - 12.0 fL    Neutrophils % 67.8 43.0 - 80.0 %    Immature Granulocytes % 0.4 0.0 - 5.0 %    Lymphocytes % 18.1 (L) 20.0 - 42.0 %    Monocytes % 10.9 2.0 - 12.0 %    Eosinophils % 1.5 0.0 - 6.0 %    Basophils % 1.3 0.0 - 2.0 %    Neutrophils Absolute 3.10 1.80 - 7.30 E9/L    Immature Granulocytes # 0.02 E9/L    Lymphocytes Absolute 0.83 (L) 1.50 - 4.00 E9/L    Monocytes Absolute 0.50 0.10 - 0.95 E9/L    Eosinophils Absolute 0.07 0.05 - 0.50 E9/L    Basophils Absolute 0.06 0.00 - 0.20 E9/L   Comprehensive Metabolic Panel   Result Value Ref Range    Sodium 116 (LL) 132 - 146 mmol/L    Potassium 3.6 3.5 - 5.0 mmol/L    Chloride 80 (L) 98 - 107 mmol/L    CO2 25 22 - 29 mmol/L    Anion Gap 11 7 - 16 mmol/L    Glucose 83 74 - 99 mg/dL    BUN 14 6 - 20 mg/dL    CREATININE 0.8 0.5 - 1.0 mg/dL    GFR Non-African American >60 >=60 mL/min/1.73    GFR African American >60     Calcium 9.5 8.6 - 10.2 mg/dL    Total Protein 7.5 6.4 - 8.3 g/dL    Albumin 4.2 3.5 - 5.2 g/dL    Total Bilirubin 0.3 0.0 - 1.2 mg/dL    Alkaline Phosphatase 132 (H) 35 - 104 U/L    ALT 29 0 - 32 U/L    AST 44 (H) 0 - 31 U/L   Lactic Acid, Plasma   Result Value Ref Range    Lactic Acid 1.3 0.5 - 2.2 mmol/L   Urinalysis   Result Value Ref Range    Color, UA Yellow Straw/Yellow    Clarity, UA Clear Clear    Glucose, Ur Negative Negative mg/dL    Bilirubin Urine Negative Negative    Ketones, Urine Negative Negative mg/dL    Specific Gravity, UA 1.010 1.005 - 1.030    Blood, Urine Negative Negative    pH, UA 6.0 5.0 - 9.0    Protein, UA Negative Negative mg/dL    Urobilinogen, Urine 0.2 <2.0 E.U./dL    Nitrite, Urine Negative Negative    Leukocyte Esterase, Urine Negative Negative   Lipase   Result Value Ref Range    Lipase 27 13 - 60 U/L   Troponin   Result Value Ref Range    Troponin, High Sensitivity 7 0 - 9 ng/L   Basic metabolic panel   Result Value Ref Range    Sodium 119 (LL) 132 - 146 mmol/L    Potassium 3.3 (L) 3.5 - 5.0 mmol/L    Chloride 86 (L) 98 - 107 mmol/L    CO2 22 22 - 29 mmol/L    Anion Gap 11 7 - 16 mmol/L    Glucose 74 74 - 99 mg/dL    BUN 12 6 - 20 mg/dL    CREATININE 0.7 0.5 - 1.0 mg/dL    GFR Non-African American >60 >=60 mL/min/1.73    GFR African American >60     Calcium 8.6 8.6 - 10.2 mg/dL   Creatinine, Random Urine   Result Value Ref Range    Creatinine, Ur 31 29 - 226 mg/dL   Urine electrolytes   Result Value Ref Range    Sodium, Ur <20 Not Established mmol/L    Potassium, Ur 10.3 Not Established mmol/L    Chloride <20 Not Established mmol/L   ,       RADIOLOGY:  Interpreted by Radiologist unless otherwise specified  CT ABDOMEN PELVIS WO CONTRAST Additional Contrast? None   Final Result   New bilateral lung base infiltrates may be edema and/or atelectasis. Differential includes multifocal pneumonitis      Small hiatal hernia      No definite CT evidence of obstructive uropathy         XR CHEST PORTABLE   Final Result   No acute process. ------------------------- NURSING NOTES AND VITALS REVIEWED ---------------------------   The nursing notes within the ED encounter and vital signs as below have been reviewed by myself  BP (!) 179/92   Pulse 67   Temp 98.2 °F (36.8 °C)   Resp 18   Ht 5' 8\" (1.727 m)   Wt 196 lb (88.9 kg)   SpO2 98%   BMI 29.80 kg/m²     Oxygen Saturation Interpretation: Normal    The cardiac monitor revealed nsr with a heart rate in the 60s as interpreted by me. The cardiac monitor was ordered secondary to the patient's heart rate and to monitor the patient for dysrhythmia. CPT 20434    The patients available past medical records and past encounters were reviewed. ------------------------------ ED COURSE/MEDICAL DECISION MAKING----------------------  Medications   aluminum & magnesium hydroxide-simethicone (MAALOX) 30 mL, lidocaine viscous hcl (XYLOCAINE) 5 mL (GI COCKTAIL) ( Oral Given 7/27/21 1433)   0.9 % sodium chloride bolus (0 mLs Intravenous Stopped 7/27/21 1628)   pantoprazole (PROTONIX) injection 40 mg (40 mg Intravenous Given 7/27/21 1644)     And   sodium chloride (PF) 0.9 % injection 10 mL (10 mLs Intravenous Given 7/27/21 1644)   doxycycline hyclate (VIBRAMYCIN) capsule 100 mg (100 mg Oral Given 7/27/21 2026)   cefdinir (OMNICEF) capsule 300 mg (300 mg Oral Given 7/27/21 2026)                  Medical Decision Making:     I, Dr. Yeimi Smith am the primary provider of record    Patient was mainly concerned to get this epigastric pain evaluated. She did have improvement with GI cocktail however she continued to have the bilateral flank pain as separate symptomatology. I obtained CT scan which showed a small hiatal hernia likely contributing to the component of gastritis. Otherwise the CT does not show intra-abdominal pathology but does show bibasilar infiltrates. After finding this result I discussed with the patient and she states that she has always had a chronic cough which is remained unchanged. The family states they thought she sounded worse recently. Because of this I believe the patient would benefit from antibiotic coverage as this could be the reason for her flank pain. Surprisingly the patient had a sodium of 119. She appeared dehydrated on my initial evaluation I did give her a bolus of fluids. We did not continue any fluids at that point and I consulted nephrology. We discussed admission and further evaluation. I then went to discuss this with the patient and she adamantly refused admission.   She states she has felt like this for months and finally wanted to get it checked out but definitely did not want to stay in the hospital.  She was surprised by this sodium level. Upon further review the patient admits to high levels of alcohol intake chronically and she tells me this is why she does not really eat much. We discussed that there could be a component of beer potomania and this could be why and she is doing so well with a sodium so low. I discussed that I still believe the patient requires admission for further evaluation and management. The patient understands my concern and wants to go home and follow-up as an outpatient. She has capacity to make this decision. We discussed at length signs symptoms for which to return to the emergency department as well as the importance of close outpatient follow-up. Re-Evaluations:  ED Course as of Aug 06 0845   Tue Jul 27, 2021   1721 Critical result for sodium of 119    [CB]      ED Course User Index  [CB] Elkin Whitfield MD       Improved with GI cocktail. Results discussed. Patient refuses admission. Risks discussed at length including seizure and death. This patient's ED course included: a personal history and physicial examination, re-evaluation prior to disposition, multiple bedside re-evaluations, IV medications, cardiac monitoring, continuous pulse oximetry and complex medical decision making and emergency management    This patient has remained hemodynamically stable during their ED course. Consultations:  Nephrology        Counseling: The emergency provider has spoken with the patient and discussed todays results, in addition to providing specific details for the plan of care and counseling regarding the diagnosis and prognosis. Questions are answered at this time and they are agreeable with the plan.       --------------------------------- IMPRESSION AND DISPOSITION ---------------------------------    IMPRESSION  1.  Pneumonia of both lungs due to infectious organism, unspecified part of lung    2. Acute gastritis without hemorrhage, unspecified gastritis type    3. Hyponatremia        DISPOSITION  Disposition: Discharge to home  Patient condition is stable        NOTE: This report was transcribed using voice recognition software.  Every effort was made to ensure accuracy; however, inadvertent computerized transcription errors may be present        Naheed Ulrich MD  08/06/21 [id number]

## 2021-11-15 ENCOUNTER — HOSPITAL ENCOUNTER (OUTPATIENT)
Age: 56
Discharge: HOME | End: 2021-11-17

## 2021-11-15 PROCEDURE — 88305 TISSUE EXAM BY PATHOLOGIST: CPT

## 2022-01-21 ENCOUNTER — HOSPITAL ENCOUNTER (OUTPATIENT)
Age: 57
Discharge: HOME | End: 2022-01-23

## 2022-01-21 PROCEDURE — 88342 IMHCHEM/IMCYTCHM 1ST ANTB: CPT

## 2022-01-21 PROCEDURE — 88305 TISSUE EXAM BY PATHOLOGIST: CPT

## 2022-06-20 ENCOUNTER — HOSPITAL ENCOUNTER (OUTPATIENT)
Age: 57
Discharge: HOME | End: 2022-06-20
Payer: MEDICARE

## 2022-06-20 LAB
BASOPHILS ABSOLUTE: 0.04 E9/L (ref 0–0.2)
BASOPHILS RELATIVE PERCENT: 1 % (ref 0–2)
C-REACTIVE PROTEIN: 0.3 MG/DL (ref 0–0.4)
EOSINOPHILS ABSOLUTE: 0.06 E9/L (ref 0.05–0.5)
EOSINOPHILS RELATIVE PERCENT: 1.6 % (ref 0–6)
HCT VFR BLD CALC: 32.4 % (ref 34–48)
HEMOGLOBIN: 10.7 G/DL (ref 11.5–15.5)
IMMATURE GRANULOCYTES #: 0.01 E9/L
IMMATURE GRANULOCYTES %: 0.3 % (ref 0–5)
LYMPHOCYTES ABSOLUTE: 0.92 E9/L (ref 1.5–4)
LYMPHOCYTES RELATIVE PERCENT: 23.8 % (ref 20–42)
MCH RBC QN AUTO: 28.4 PG (ref 26–35)
MCHC RBC AUTO-ENTMCNC: 33 % (ref 32–34.5)
MCV RBC AUTO: 85.9 FL (ref 80–99.9)
MONOCYTES ABSOLUTE: 0.53 E9/L (ref 0.1–0.95)
MONOCYTES RELATIVE PERCENT: 13.7 % (ref 2–12)
NEUTROPHILS ABSOLUTE: 2.31 E9/L (ref 1.8–7.3)
NEUTROPHILS RELATIVE PERCENT: 59.6 % (ref 43–80)
PDW BLD-RTO: 12.7 FL (ref 11.5–15)
PLATELET # BLD: 283 E9/L (ref 130–450)
PMV BLD AUTO: 8.7 FL (ref 7–12)
RBC # BLD: 3.77 E12/L (ref 3.5–5.5)
SEDIMENTATION RATE, ERYTHROCYTE: 15 MM/HR (ref 0–20)
WBC # BLD: 3.9 E9/L (ref 4.5–11.5)

## 2022-06-20 PROCEDURE — 86140 C-REACTIVE PROTEIN: CPT

## 2022-06-20 PROCEDURE — 85025 COMPLETE CBC W/AUTO DIFF WBC: CPT

## 2022-06-20 PROCEDURE — 85651 RBC SED RATE NONAUTOMATED: CPT

## 2022-06-20 PROCEDURE — 36415 COLL VENOUS BLD VENIPUNCTURE: CPT

## 2022-08-22 ENCOUNTER — HOSPITAL ENCOUNTER (OUTPATIENT)
Age: 57
Discharge: HOME | End: 2022-08-22
Payer: MEDICARE

## 2022-08-22 LAB
BASOPHILS ABSOLUTE: 0.03 E9/L (ref 0–0.2)
BASOPHILS RELATIVE PERCENT: 0.7 % (ref 0–2)
C-REACTIVE PROTEIN: 0.3 MG/DL (ref 0–0.4)
EOSINOPHILS ABSOLUTE: 0.06 E9/L (ref 0.05–0.5)
EOSINOPHILS RELATIVE PERCENT: 1.4 % (ref 0–6)
HCT VFR BLD CALC: 32.8 % (ref 34–48)
HEMOGLOBIN: 10.9 G/DL (ref 11.5–15.5)
IMMATURE GRANULOCYTES #: 0.01 E9/L
IMMATURE GRANULOCYTES %: 0.2 % (ref 0–5)
LYMPHOCYTES ABSOLUTE: 0.97 E9/L (ref 1.5–4)
LYMPHOCYTES RELATIVE PERCENT: 23.2 % (ref 20–42)
MCH RBC QN AUTO: 27.5 PG (ref 26–35)
MCHC RBC AUTO-ENTMCNC: 33.2 % (ref 32–34.5)
MCV RBC AUTO: 82.8 FL (ref 80–99.9)
MONOCYTES ABSOLUTE: 0.72 E9/L (ref 0.1–0.95)
MONOCYTES RELATIVE PERCENT: 17.2 % (ref 2–12)
NEUTROPHILS ABSOLUTE: 2.4 E9/L (ref 1.8–7.3)
NEUTROPHILS RELATIVE PERCENT: 57.3 % (ref 43–80)
PDW BLD-RTO: 13.2 FL (ref 11.5–15)
PLATELET # BLD: 392 E9/L (ref 130–450)
PMV BLD AUTO: 8.1 FL (ref 7–12)
RBC # BLD: 3.96 E12/L (ref 3.5–5.5)
SEDIMENTATION RATE, ERYTHROCYTE: 28 MM/HR (ref 0–20)
WBC # BLD: 4.2 E9/L (ref 4.5–11.5)

## 2022-08-22 PROCEDURE — 85651 RBC SED RATE NONAUTOMATED: CPT

## 2022-08-22 PROCEDURE — 85025 COMPLETE CBC W/AUTO DIFF WBC: CPT

## 2022-08-22 PROCEDURE — 86140 C-REACTIVE PROTEIN: CPT

## 2022-08-22 PROCEDURE — 36415 COLL VENOUS BLD VENIPUNCTURE: CPT

## 2022-09-28 ENCOUNTER — HOSPITAL ENCOUNTER (OUTPATIENT)
Age: 57
Discharge: HOME | End: 2022-09-30

## 2022-09-28 PROCEDURE — 87102 FUNGUS ISOLATION CULTURE: CPT

## 2022-09-28 PROCEDURE — 87206 SMEAR FLUORESCENT/ACID STAI: CPT

## 2022-09-28 PROCEDURE — 87116 MYCOBACTERIA CULTURE: CPT

## 2022-09-28 PROCEDURE — 87205 SMEAR GRAM STAIN: CPT

## 2022-09-28 PROCEDURE — 87015 SPECIMEN INFECT AGNT CONCNTJ: CPT

## 2022-09-28 PROCEDURE — 87075 CULTR BACTERIA EXCEPT BLOOD: CPT

## 2022-09-28 PROCEDURE — 87070 CULTURE OTHR SPECIMN AEROBIC: CPT

## 2022-09-29 LAB
GRAM STAIN ORDERABLE: NORMAL
GRAM STAIN ORDERABLE: NORMAL

## 2022-09-30 LAB
WOUND/ABSCESS: NORMAL
WOUND/ABSCESS: NORMAL

## 2022-10-13 LAB
ANAEROBIC CULTURE: NORMAL
ANAEROBIC CULTURE: NORMAL

## 2022-10-24 LAB
FUNGUS (MYCOLOGY) CULTURE: NORMAL
FUNGUS (MYCOLOGY) CULTURE: NORMAL
FUNGUS STAIN: NORMAL
FUNGUS STAIN: NORMAL

## 2022-10-25 LAB
AFB CULTURE (MYCOBACTERIA): NORMAL
AFB CULTURE (MYCOBACTERIA): NORMAL
AFB SMEAR: NORMAL
AFB SMEAR: NORMAL

## 2022-10-27 ENCOUNTER — APPOINTMENT (OUTPATIENT)
Dept: CT IMAGING | Age: 57
DRG: 024 | End: 2022-10-27
Payer: MEDICARE

## 2022-10-27 ENCOUNTER — ANESTHESIA (OUTPATIENT)
Dept: INTERVENTIONAL RADIOLOGY/VASCULAR | Age: 57
DRG: 024 | End: 2022-10-27
Payer: MEDICARE

## 2022-10-27 ENCOUNTER — HOSPITAL ENCOUNTER (INPATIENT)
Age: 57
LOS: 3 days | Discharge: HOME | DRG: 024 | End: 2022-10-30
Attending: EMERGENCY MEDICINE | Admitting: FAMILY MEDICINE
Payer: MEDICARE

## 2022-10-27 ENCOUNTER — APPOINTMENT (OUTPATIENT)
Dept: INTERVENTIONAL RADIOLOGY/VASCULAR | Age: 57
DRG: 024 | End: 2022-10-27
Payer: MEDICARE

## 2022-10-27 ENCOUNTER — ANESTHESIA EVENT (OUTPATIENT)
Dept: INTERVENTIONAL RADIOLOGY/VASCULAR | Age: 57
DRG: 024 | End: 2022-10-27
Payer: MEDICARE

## 2022-10-27 ENCOUNTER — APPOINTMENT (OUTPATIENT)
Dept: GENERAL RADIOLOGY | Age: 57
DRG: 024 | End: 2022-10-27
Payer: MEDICARE

## 2022-10-27 DIAGNOSIS — I63.9 CEREBROVASCULAR ACCIDENT (CVA), UNSPECIFIED MECHANISM (HCC): Primary | ICD-10-CM

## 2022-10-27 PROBLEM — I63.511 ACUTE ISCHEMIC RIGHT MCA STROKE (HCC): Status: ACTIVE | Noted: 2022-10-27

## 2022-10-27 LAB
ALBUMIN SERPL-MCNC: 4.4 G/DL (ref 3.5–5.2)
ALP BLD-CCNC: 138 U/L (ref 35–104)
ALT SERPL-CCNC: 12 U/L (ref 0–32)
ANION GAP SERPL CALCULATED.3IONS-SCNC: 11 MMOL/L (ref 7–16)
APTT: 27.8 SEC (ref 24.5–35.1)
AST SERPL-CCNC: 16 U/L (ref 0–31)
BASOPHILS ABSOLUTE: 0.02 E9/L (ref 0–0.2)
BASOPHILS RELATIVE PERCENT: 0.2 % (ref 0–2)
BILIRUB SERPL-MCNC: <0.2 MG/DL (ref 0–1.2)
BUN BLDV-MCNC: 22 MG/DL (ref 6–20)
CALCIUM SERPL-MCNC: 9.6 MG/DL (ref 8.6–10.2)
CHLORIDE BLD-SCNC: 91 MMOL/L (ref 98–107)
CO2: 27 MMOL/L (ref 22–29)
CREAT SERPL-MCNC: 0.9 MG/DL (ref 0.5–1)
EOSINOPHILS ABSOLUTE: 0.12 E9/L (ref 0.05–0.5)
EOSINOPHILS RELATIVE PERCENT: 1.2 % (ref 0–6)
ETHANOL: <10 MG/DL (ref 0–0.08)
GFR SERPL CREATININE-BSD FRML MDRD: >60 ML/MIN/1.73
GLUCOSE BLD-MCNC: 110 MG/DL (ref 74–99)
HCT VFR BLD CALC: 30.6 % (ref 34–48)
HEMOGLOBIN: 10.1 G/DL (ref 11.5–15.5)
IMMATURE GRANULOCYTES #: 0.08 E9/L
IMMATURE GRANULOCYTES %: 0.8 % (ref 0–5)
INR BLD: 0.9
LYMPHOCYTES ABSOLUTE: 1.49 E9/L (ref 1.5–4)
LYMPHOCYTES RELATIVE PERCENT: 15.3 % (ref 20–42)
MCH RBC QN AUTO: 27.2 PG (ref 26–35)
MCHC RBC AUTO-ENTMCNC: 33 % (ref 32–34.5)
MCV RBC AUTO: 82.5 FL (ref 80–99.9)
METER GLUCOSE: 119 MG/DL (ref 74–99)
MONOCYTES ABSOLUTE: 1.01 E9/L (ref 0.1–0.95)
MONOCYTES RELATIVE PERCENT: 10.4 % (ref 2–12)
NEUTROPHILS ABSOLUTE: 7.02 E9/L (ref 1.8–7.3)
NEUTROPHILS RELATIVE PERCENT: 72.1 % (ref 43–80)
PDW BLD-RTO: 14.1 FL (ref 11.5–15)
PLATELET # BLD: 401 E9/L (ref 130–450)
PMV BLD AUTO: 8.1 FL (ref 7–12)
POTASSIUM REFLEX MAGNESIUM: 3.9 MMOL/L (ref 3.5–5)
PROTHROMBIN TIME: 10.2 SEC (ref 9.3–12.4)
RBC # BLD: 3.71 E12/L (ref 3.5–5.5)
SARS-COV-2, NAAT: NOT DETECTED
SODIUM BLD-SCNC: 129 MMOL/L (ref 132–146)
TOTAL PROTEIN: 7 G/DL (ref 6.4–8.3)
TROPONIN, HIGH SENSITIVITY: 7 NG/L (ref 0–9)
WBC # BLD: 9.7 E9/L (ref 4.5–11.5)

## 2022-10-27 PROCEDURE — 6370000000 HC RX 637 (ALT 250 FOR IP)

## 2022-10-27 PROCEDURE — 7100000001 HC PACU RECOVERY - ADDTL 15 MIN

## 2022-10-27 PROCEDURE — 6360000004 HC RX CONTRAST MEDICATION: Performed by: PSYCHIATRY & NEUROLOGY

## 2022-10-27 PROCEDURE — 2580000003 HC RX 258: Performed by: PSYCHIATRY & NEUROLOGY

## 2022-10-27 PROCEDURE — 6370000000 HC RX 637 (ALT 250 FOR IP): Performed by: PSYCHIATRY & NEUROLOGY

## 2022-10-27 PROCEDURE — 70450 CT HEAD/BRAIN W/O DYE: CPT | Performed by: RADIOLOGY

## 2022-10-27 PROCEDURE — 0042T CT BRAIN PERFUSION: CPT | Performed by: RADIOLOGY

## 2022-10-27 PROCEDURE — 2580000003 HC RX 258: Performed by: NURSE ANESTHETIST, CERTIFIED REGISTERED

## 2022-10-27 PROCEDURE — 4A03X5D MEASUREMENT OF ARTERIAL FLOW, INTRACRANIAL, EXTERNAL APPROACH: ICD-10-PCS | Performed by: RADIOLOGY

## 2022-10-27 PROCEDURE — 2500000003 HC RX 250 WO HCPCS: Performed by: PSYCHIATRY & NEUROLOGY

## 2022-10-27 PROCEDURE — C1760 CLOSURE DEV, VASC: HCPCS

## 2022-10-27 PROCEDURE — 2000000000 HC ICU R&B

## 2022-10-27 PROCEDURE — 93005 ELECTROCARDIOGRAM TRACING: CPT | Performed by: STUDENT IN AN ORGANIZED HEALTH CARE EDUCATION/TRAINING PROGRAM

## 2022-10-27 PROCEDURE — 99285 EMERGENCY DEPT VISIT HI MDM: CPT

## 2022-10-27 PROCEDURE — 6360000002 HC RX W HCPCS: Performed by: NURSE ANESTHETIST, CERTIFIED REGISTERED

## 2022-10-27 PROCEDURE — 84484 ASSAY OF TROPONIN QUANT: CPT

## 2022-10-27 PROCEDURE — 03CK3Z7 EXTIRPATION OF MATTER FROM RIGHT INTERNAL CAROTID ARTERY USING STENT RETRIEVER, PERCUTANEOUS APPROACH: ICD-10-PCS | Performed by: PSYCHIATRY & NEUROLOGY

## 2022-10-27 PROCEDURE — 3700000001 HC ADD 15 MINUTES (ANESTHESIA)

## 2022-10-27 PROCEDURE — 80053 COMPREHEN METABOLIC PANEL: CPT

## 2022-10-27 PROCEDURE — 70498 CT ANGIOGRAPHY NECK: CPT

## 2022-10-27 PROCEDURE — 0042T CT BRAIN PERFUSION: CPT

## 2022-10-27 PROCEDURE — 61645 PERQ ART M-THROMBECT &/NFS: CPT

## 2022-10-27 PROCEDURE — 7100000000 HC PACU RECOVERY - FIRST 15 MIN

## 2022-10-27 PROCEDURE — 2500000003 HC RX 250 WO HCPCS: Performed by: NURSE ANESTHETIST, CERTIFIED REGISTERED

## 2022-10-27 PROCEDURE — 70496 CT ANGIOGRAPHY HEAD: CPT

## 2022-10-27 PROCEDURE — 76377 3D RENDER W/INTRP POSTPROCES: CPT

## 2022-10-27 PROCEDURE — 61645 PERQ ART M-THROMBECT &/NFS: CPT | Performed by: PSYCHIATRY & NEUROLOGY

## 2022-10-27 PROCEDURE — 82962 GLUCOSE BLOOD TEST: CPT

## 2022-10-27 PROCEDURE — 85610 PROTHROMBIN TIME: CPT

## 2022-10-27 PROCEDURE — 85025 COMPLETE CBC W/AUTO DIFF WBC: CPT

## 2022-10-27 PROCEDURE — 6360000004 HC RX CONTRAST MEDICATION: Performed by: RADIOLOGY

## 2022-10-27 PROCEDURE — 2709999900 IR MECHANICAL ART THROMBECTOMY INTRACRANIAL

## 2022-10-27 PROCEDURE — 70498 CT ANGIOGRAPHY NECK: CPT | Performed by: RADIOLOGY

## 2022-10-27 PROCEDURE — 99221 1ST HOSP IP/OBS SF/LOW 40: CPT | Performed by: PSYCHIATRY & NEUROLOGY

## 2022-10-27 PROCEDURE — 6360000002 HC RX W HCPCS: Performed by: NURSE PRACTITIONER

## 2022-10-27 PROCEDURE — 82077 ASSAY SPEC XCP UR&BREATH IA: CPT

## 2022-10-27 PROCEDURE — 87635 SARS-COV-2 COVID-19 AMP PRB: CPT

## 2022-10-27 PROCEDURE — 85730 THROMBOPLASTIN TIME PARTIAL: CPT

## 2022-10-27 PROCEDURE — 6360000002 HC RX W HCPCS: Performed by: PSYCHIATRY & NEUROLOGY

## 2022-10-27 PROCEDURE — 70496 CT ANGIOGRAPHY HEAD: CPT | Performed by: RADIOLOGY

## 2022-10-27 PROCEDURE — 3700000000 HC ANESTHESIA ATTENDED CARE

## 2022-10-27 PROCEDURE — 70450 CT HEAD/BRAIN W/O DYE: CPT

## 2022-10-27 PROCEDURE — 36415 COLL VENOUS BLD VENIPUNCTURE: CPT

## 2022-10-27 RX ORDER — ONDANSETRON 2 MG/ML
4 INJECTION INTRAMUSCULAR; INTRAVENOUS EVERY 6 HOURS PRN
Status: DISCONTINUED | OUTPATIENT
Start: 2022-10-27 | End: 2022-10-30 | Stop reason: HOSPADM

## 2022-10-27 RX ORDER — FOLIC ACID 1 MG/1
1 TABLET ORAL DAILY
Status: DISCONTINUED | OUTPATIENT
Start: 2022-10-27 | End: 2022-10-30 | Stop reason: HOSPADM

## 2022-10-27 RX ORDER — SUCCINYLCHOLINE/SOD CL,ISO/PF 100 MG/5ML
SYRINGE (ML) INTRAVENOUS PRN
Status: DISCONTINUED | OUTPATIENT
Start: 2022-10-27 | End: 2022-10-27 | Stop reason: SDUPTHER

## 2022-10-27 RX ORDER — HEPARIN SODIUM 10000 [USP'U]/ML
INJECTION, SOLUTION INTRAVENOUS; SUBCUTANEOUS
Status: COMPLETED | OUTPATIENT
Start: 2022-10-27 | End: 2022-10-27

## 2022-10-27 RX ORDER — HYDRALAZINE HYDROCHLORIDE 20 MG/ML
5 INJECTION INTRAMUSCULAR; INTRAVENOUS EVERY 10 MIN PRN
Status: DISCONTINUED | OUTPATIENT
Start: 2022-10-27 | End: 2022-10-28

## 2022-10-27 RX ORDER — ASPIRIN 300 MG/1
300 SUPPOSITORY RECTAL ONCE
Status: COMPLETED | OUTPATIENT
Start: 2022-10-27 | End: 2022-10-27

## 2022-10-27 RX ORDER — ASPIRIN 81 MG/1
81 TABLET, CHEWABLE ORAL DAILY
Status: DISCONTINUED | OUTPATIENT
Start: 2022-10-28 | End: 2022-10-30 | Stop reason: HOSPADM

## 2022-10-27 RX ORDER — SODIUM CHLORIDE 9 MG/ML
INJECTION, SOLUTION INTRAVENOUS CONTINUOUS PRN
Status: DISCONTINUED | OUTPATIENT
Start: 2022-10-27 | End: 2022-10-27 | Stop reason: SDUPTHER

## 2022-10-27 RX ORDER — LANOLIN ALCOHOL/MO/W.PET/CERES
100 CREAM (GRAM) TOPICAL DAILY
Status: DISCONTINUED | OUTPATIENT
Start: 2022-10-27 | End: 2022-10-30 | Stop reason: HOSPADM

## 2022-10-27 RX ORDER — VECURONIUM BROMIDE 1 MG/ML
INJECTION, POWDER, LYOPHILIZED, FOR SOLUTION INTRAVENOUS PRN
Status: DISCONTINUED | OUTPATIENT
Start: 2022-10-27 | End: 2022-10-27 | Stop reason: SDUPTHER

## 2022-10-27 RX ORDER — PROPOFOL 10 MG/ML
INJECTION, EMULSION INTRAVENOUS PRN
Status: DISCONTINUED | OUTPATIENT
Start: 2022-10-27 | End: 2022-10-27 | Stop reason: SDUPTHER

## 2022-10-27 RX ORDER — DIAZEPAM 2 MG/1
2 TABLET ORAL EVERY 8 HOURS PRN
Status: DISCONTINUED | OUTPATIENT
Start: 2022-10-27 | End: 2022-10-28

## 2022-10-27 RX ORDER — ASPIRIN 300 MG/1
SUPPOSITORY RECTAL
Status: COMPLETED
Start: 2022-10-27 | End: 2022-10-27

## 2022-10-27 RX ORDER — FENTANYL CITRATE 50 UG/ML
INJECTION, SOLUTION INTRAMUSCULAR; INTRAVENOUS PRN
Status: DISCONTINUED | OUTPATIENT
Start: 2022-10-27 | End: 2022-10-27 | Stop reason: SDUPTHER

## 2022-10-27 RX ORDER — ACETAMINOPHEN 120 MG/1
300 SUPPOSITORY RECTAL ONCE
Status: DISCONTINUED | OUTPATIENT
Start: 2022-10-27 | End: 2022-10-27

## 2022-10-27 RX ORDER — LABETALOL HYDROCHLORIDE 5 MG/ML
INJECTION, SOLUTION INTRAVENOUS PRN
Status: DISCONTINUED | OUTPATIENT
Start: 2022-10-27 | End: 2022-10-27 | Stop reason: SDUPTHER

## 2022-10-27 RX ORDER — ONDANSETRON 4 MG/1
4 TABLET, ORALLY DISINTEGRATING ORAL EVERY 8 HOURS PRN
Status: DISCONTINUED | OUTPATIENT
Start: 2022-10-27 | End: 2022-10-30 | Stop reason: HOSPADM

## 2022-10-27 RX ORDER — ATORVASTATIN CALCIUM 40 MG/1
40 TABLET, FILM COATED ORAL NIGHTLY
Status: DISCONTINUED | OUTPATIENT
Start: 2022-10-27 | End: 2022-10-29

## 2022-10-27 RX ORDER — ACETAMINOPHEN 325 MG/1
650 TABLET ORAL EVERY 4 HOURS PRN
Status: DISCONTINUED | OUTPATIENT
Start: 2022-10-27 | End: 2022-10-30 | Stop reason: HOSPADM

## 2022-10-27 RX ORDER — SODIUM CHLORIDE 9 MG/ML
INJECTION, SOLUTION INTRAVENOUS PRN
Status: DISCONTINUED | OUTPATIENT
Start: 2022-10-27 | End: 2022-10-27

## 2022-10-27 RX ORDER — LABETALOL HYDROCHLORIDE 5 MG/ML
5 INJECTION, SOLUTION INTRAVENOUS EVERY 10 MIN PRN
Status: DISCONTINUED | OUTPATIENT
Start: 2022-10-27 | End: 2022-10-27

## 2022-10-27 RX ORDER — MULTIVITAMIN WITH IRON
1 TABLET ORAL DAILY
Status: DISCONTINUED | OUTPATIENT
Start: 2022-10-27 | End: 2022-10-30 | Stop reason: HOSPADM

## 2022-10-27 RX ORDER — LABETALOL HYDROCHLORIDE 5 MG/ML
5 INJECTION, SOLUTION INTRAVENOUS EVERY 10 MIN PRN
Status: DISCONTINUED | OUTPATIENT
Start: 2022-10-27 | End: 2022-10-28

## 2022-10-27 RX ORDER — SODIUM CHLORIDE 0.9 % (FLUSH) 0.9 %
5-40 SYRINGE (ML) INJECTION PRN
Status: DISCONTINUED | OUTPATIENT
Start: 2022-10-27 | End: 2022-10-30 | Stop reason: HOSPADM

## 2022-10-27 RX ORDER — SODIUM CHLORIDE 0.9 % (FLUSH) 0.9 %
5-40 SYRINGE (ML) INJECTION EVERY 12 HOURS SCHEDULED
Status: DISCONTINUED | OUTPATIENT
Start: 2022-10-27 | End: 2022-10-30 | Stop reason: HOSPADM

## 2022-10-27 RX ORDER — LORAZEPAM 2 MG/ML
2 INJECTION INTRAMUSCULAR EVERY 6 HOURS PRN
Status: DISCONTINUED | OUTPATIENT
Start: 2022-10-27 | End: 2022-10-28

## 2022-10-27 RX ORDER — LIDOCAINE HYDROCHLORIDE 20 MG/ML
INJECTION, SOLUTION INFILTRATION; PERINEURAL
Status: COMPLETED | OUTPATIENT
Start: 2022-10-27 | End: 2022-10-27

## 2022-10-27 RX ORDER — SODIUM CHLORIDE 9 MG/ML
INJECTION, SOLUTION INTRAVENOUS PRN
Status: DISCONTINUED | OUTPATIENT
Start: 2022-10-27 | End: 2022-10-30 | Stop reason: HOSPADM

## 2022-10-27 RX ORDER — HYDRALAZINE HYDROCHLORIDE 20 MG/ML
5 INJECTION INTRAMUSCULAR; INTRAVENOUS EVERY 10 MIN PRN
Status: DISCONTINUED | OUTPATIENT
Start: 2022-10-27 | End: 2022-10-27

## 2022-10-27 RX ADMIN — LABETALOL HYDROCHLORIDE 5 MG: 5 INJECTION INTRAVENOUS at 13:56

## 2022-10-27 RX ADMIN — ASPIRIN 300 MG: 300 SUPPOSITORY RECTAL at 13:30

## 2022-10-27 RX ADMIN — FENTANYL CITRATE 50 MCG: 50 INJECTION, SOLUTION INTRAMUSCULAR; INTRAVENOUS at 14:49

## 2022-10-27 RX ADMIN — DIAZEPAM 2 MG: 2 TABLET ORAL at 21:04

## 2022-10-27 RX ADMIN — FOLIC ACID 1 MG: 1 TABLET ORAL at 21:04

## 2022-10-27 RX ADMIN — IOPAMIDOL 100 ML: 612 INJECTION, SOLUTION INTRAVENOUS at 14:45

## 2022-10-27 RX ADMIN — VECURONIUM BROMIDE FOR INJECTION 5 MG: 1 INJECTION, POWDER, LYOPHILIZED, FOR SOLUTION INTRAVENOUS at 13:55

## 2022-10-27 RX ADMIN — PROPOFOL 50 MG: 10 INJECTION, EMULSION INTRAVENOUS at 13:38

## 2022-10-27 RX ADMIN — SODIUM CHLORIDE, PRESERVATIVE FREE 10 ML: 5 INJECTION INTRAVENOUS at 21:04

## 2022-10-27 RX ADMIN — NICARDIPINE HYDROCHLORIDE 5 MG/HR: 2.5 INJECTION INTRAVENOUS at 14:22

## 2022-10-27 RX ADMIN — Medication 5000 UNITS: at 13:52

## 2022-10-27 RX ADMIN — Medication 1000 ML: at 13:51

## 2022-10-27 RX ADMIN — PROPOFOL 100 MG: 10 INJECTION, EMULSION INTRAVENOUS at 13:34

## 2022-10-27 RX ADMIN — Medication 160 MG: at 13:41

## 2022-10-27 RX ADMIN — FENTANYL CITRATE 50 MCG: 50 INJECTION, SOLUTION INTRAMUSCULAR; INTRAVENOUS at 13:34

## 2022-10-27 RX ADMIN — LIDOCAINE HYDROCHLORIDE 5 ML: 20 INJECTION, SOLUTION INFILTRATION; PERINEURAL at 13:47

## 2022-10-27 RX ADMIN — PROPOFOL 50 MG: 10 INJECTION, EMULSION INTRAVENOUS at 13:41

## 2022-10-27 RX ADMIN — LABETALOL HYDROCHLORIDE 2.5 MG: 5 INJECTION INTRAVENOUS at 14:15

## 2022-10-27 RX ADMIN — SODIUM CHLORIDE 7.5 MG/HR: 9 INJECTION, SOLUTION INTRAVENOUS at 15:12

## 2022-10-27 RX ADMIN — Medication 5000 UNITS: at 13:51

## 2022-10-27 RX ADMIN — FENTANYL CITRATE 50 MCG: 50 INJECTION, SOLUTION INTRAMUSCULAR; INTRAVENOUS at 13:41

## 2022-10-27 RX ADMIN — IOPAMIDOL 105 ML: 755 INJECTION, SOLUTION INTRAVENOUS at 12:59

## 2022-10-27 RX ADMIN — ATORVASTATIN CALCIUM 40 MG: 40 TABLET, FILM COATED ORAL at 21:04

## 2022-10-27 RX ADMIN — FENTANYL CITRATE 50 MCG: 50 INJECTION, SOLUTION INTRAMUSCULAR; INTRAVENOUS at 14:17

## 2022-10-27 RX ADMIN — LORAZEPAM 2 MG: 2 INJECTION INTRAMUSCULAR; INTRAVENOUS at 18:14

## 2022-10-27 RX ADMIN — SODIUM CHLORIDE: 9 INJECTION, SOLUTION INTRAVENOUS at 13:28

## 2022-10-27 RX ADMIN — MULTIVITAMIN TABLET 1 TABLET: TABLET at 21:04

## 2022-10-27 RX ADMIN — LABETALOL HYDROCHLORIDE 5 MG: 5 INJECTION INTRAVENOUS at 13:42

## 2022-10-27 RX ADMIN — Medication 100 MG: at 21:04

## 2022-10-27 ASSESSMENT — PAIN - FUNCTIONAL ASSESSMENT: PAIN_FUNCTIONAL_ASSESSMENT: NONE - DENIES PAIN

## 2022-10-27 NOTE — ANESTHESIA POSTPROCEDURE EVALUATION
Department of Anesthesiology  Postprocedure Note    Patient: Sarabjit Officer  MRN: [id number]  YOB: 1965  Date of evaluation: 10/27/2022      Procedure Summary       Date: 10/27/22 Room / Location: [address] [place] [address] Procedures; [facility]    Anesthesia Start: 1329 Anesthesia Stop:     Procedure: IR MECHANICAL ART THROMBECTOMY INTRACRANIAL Diagnosis: (STROKE)    Scheduled Providers: [place] Radiologist Responsible Provider: Cele Sims MD    Anesthesia Type: MAC ASA Status: 3 - Emergent            Anesthesia Type: No value filed.     Jose Phase I:      Jose Phase II:        Anesthesia Post Evaluation    Patient location during evaluation: PACU  Patient participation: complete - patient participated  Level of consciousness: awake  Pain score: 3  Airway patency: patent  Nausea & Vomiting: no nausea and no vomiting  Complications: no  Cardiovascular status: blood pressure returned to baseline  Respiratory status: acceptable  Hydration status: euvolemic

## 2022-10-27 NOTE — PROGRESS NOTES
Call to ER placed. ER reports patient is in interventional radiology having procedure and is not available for Echocardiogram at this time.

## 2022-10-27 NOTE — CONSULTS
TeleStroke Neurology Consult Note  10/27/2022 1:35 PM  Pt Name: Maco Webster  MRN: [id number]  YOB: 1965  Date of evaluation: 10/27/2022  Primary Care Physician: Celeste Perez MD        Stroke referral received from Leanna Barton DO based upon patient's presenting stroke like symptomology. Maco Webster is a 65 y.o. female who presents with Right sided weakness and inability to walk         Prior Functional Status(Modified Pernell Scale):  0=No symptoms at all    Allergies   Allergen Reactions    Tape Vishal Bougie Tape]      Tears skin    Pcn [Penicillins] Nausea And Vomiting       Medications  Prior to Admission medications    Medication Sig Start Date End Date Taking?  Authorizing Provider   LORazepam (ATIVAN) 0.5 MG tablet take 1 tablet by mouth 90 MINUTES PRIOR TO APPOINTMENT DO NOT DRIVE OR OPERATE MACHINERY FOR 24H 11/1/21   Historical Provider, MD   cyclobenzaprine (FLEXERIL) 10 MG tablet take 1 tablet by mouth twice a day 9/16/21   Historical Provider, MD   omeprazole (PRILOSEC) 20 MG delayed release capsule Take 1 capsule by mouth every morning (before breakfast) 7/27/21   Yasmeen Monreal MD   Calcium Carbonate Antacid 400 MG CHEW Take by mouth    Historical Provider, MD   losartan-hydrochlorothiazide (HYZAAR) 100-25 MG per tablet Take 1 tablet by mouth daily  1/10/19   Historical Provider, MD   fluticasone (FLONASE) 50 MCG/ACT nasal spray 1 spray by Each Nare route daily    Historical Provider, MD   albuterol sulfate  (90 Base) MCG/ACT inhaler Inhale 2 puffs into the lungs every 6 hours as needed for Wheezing    Historical Provider, MD   naproxen (NAPROSYN) 500 MG tablet Take 1 tablet by mouth 2 times daily for 7 days 10/29/18 11/5/18  Teena Doshi PA-C   NONFORMULARY as needed Eye drops for allergies     Historical Provider, MD   DiphenhydrAMINE HCl (BENADRYL ALLERGY PO) Take 50 mg by mouth daily     Historical Provider, MD   furosemide (LASIX) 20 MG tablet Take 40 mg by mouth daily     Historical Provider, MD   ibuprofen (ADVIL;MOTRIN) 800 MG tablet Take 1 tablet by mouth every 8 hours as needed for Pain for 21 doses. 14   Patel Mcfadden MD         Past Medical History:   Diagnosis Date    Asthma     CAD (coronary artery disease)      had MI per patient  / follows with Dr. Danika Ralph    Environmental allergies     Fracture     right 5th metacarpal    Heart palpitations     Hypertension     MI (myocardial infarction) [facility]) 2011    Syncope and collapse      Past Surgical History:   Procedure Laterality Date    ANKLE SURGERY Right 2013     SECTION      x2    ENDOMETRIAL ABLATION  2014    FINGER SURGERY Right 2017    right 5th metacarpal open reduction internal fixation    LEG TENDON SURGERY      right leg 3/24/14     Social History     Socioeconomic History    Marital status:      Spouse name: Not on file    Number of children: Not on file    Years of education: Not on file    Highest education level: Not on file   Occupational History    Not on file   Tobacco Use    Smoking status: Former     Packs/day: 2.00     Years: 6.00     Pack years: 12.00     Types: Cigarettes     Quit date: 2011     Years since quittin.2    Smokeless tobacco: Never   Substance and Sexual Activity    Alcohol use:  Yes     Alcohol/week: 42.0 standard drinks     Types: 42 Cans of beer per week     Comment: daily    Drug use: No    Sexual activity: Yes   Other Topics Concern    Not on file   Social History Narrative    Not on file     Social Determinants of Health     Financial Resource Strain: Not on file   Food Insecurity: Not on file   Transportation Needs: Not on file   Physical Activity: Not on file   Stress: Not on file   Social Connections: Not on file   Intimate Partner Violence: Not on file   Housing Stability: Not on file       OBJECTIVE  Vitals:    10/27/22 1301 10/27/22 1302 10/27/22 1305 10/27/22 1320   BP:   138/72 (!) 172/98   Pulse: 82 83     Resp: 15 13 18 Temp:   98.3 °F (36.8 °C)    SpO2:   94%      NIH Stroke Scale at time of initial evaluation:        NIH Stroke Scale/Score at time of initial evaluation:    1A: Level of Consciousness 0 - alert; keenly responsive   1B: Ask Month and Age 0 - answers both questions correctly   1C: Tell Patient To Open and Close Eyes, then Hand  Squeeze 0 - performs both tasks correctly   2: Test Horizontal Extraocular Movements 1 - partial gaze palsy   3: Test Visual Fields 2 - complete hemianopia   4: Test Facial Palsy 1 - minor paralysis (flattened nasolabial fold, asymmetric on smiling)   5A: Test Left Arm Motor Drift 0 - no drift, limb holds 90 (or 45) degrees for full 10 seconds   5B: Test Right Arm Motor Drift 0 - no drift, limb holds 90 (or 45) degrees for full 10 seconds   6A: Test Left Leg Motor Drift 1 - drift; leg falls by the end of the 5 second period but does not hit bed   6B: Test Right Leg Motor Drift 0 - no drift; leg holds 30 degree position for full 5 seconds   7: Test Limb Ataxia (FNF/Heel-Shin) 0 - absent   8: Test Sensation 2 - severe to total sensory loss; patient is not aware of being touched in face, arm, leg   9: Test Language/Aphasia 0 - no aphasia, normal   10: Test Dysarthria 1 - mild to moderate, patient slurs at least some words and at worst, can be understood with some difficulty   11: Test Extinction/Inattention 2 - profound lisbeth-inattention or lisbeth- inattention to more than one modality.   Does not recognize own hand or orients only to one side of space    Total 10              Imaging:  CT of head without contrast displays Evidence of Intravascular Thrombus  CTA/CTP imaging: CTA right M1 thrombus, Perfusion deficit+      VIZ LVO was utilized to assist with triage      Labs:  Labs Reviewed   CBC WITH AUTO DIFFERENTIAL - Abnormal; Notable for the following components:       Result Value    Hemoglobin 10.1 (*)     Hematocrit 30.6 (*)     Lymphocytes % 15.3 (*)     Lymphocytes Absolute 1.49 (*) Monocytes Absolute 1.01 (*)     All other components within normal limits   POCT GLUCOSE - Abnormal; Notable for the following components:    Meter Glucose 119 (*)     All other components within normal limits   COVID-19, RAPID   APTT   PROTIME-INR   TROPONIN    Narrative:     High Sensitivity Troponin T   COMPREHENSIVE METABOLIC PANEL W/ REFLEX TO MG FOR LOW K       IV tnk INCLUSION criteria met No:    The following EXCLUSION criteria/contraindications were noted: NONE    Assessment:  Working Diagnosis: Ischemic stroke    Acute Right MCA stroke with thrombus    Recommendations: This patient IS a potential candidate for endovascular treatment        The Findings and Treatment Plan Were Reviewed With the Primary Provider Named My Virtual Assessment. We Collaboratively Discussed the Patient Plan, Which Will Be Executed by the Local Provider. I Was Available to Discuss Treatment Risks, Options, and Strategies, and Our Team Remains Available for Further Questions Related to My Acute Recommendations      Consultation completed by Sallie Toney MD       Neurointervention Pre procedure Note      Medical record number:  [id number]      Procedure: Right MCA thrombectomy, Possible Right ICA stenting  Indications:  STROKE  Labs: The patient's record including history and physical, available labs and procedures, medications and allergies has been reviewed. PRE-PROCEDURE ATTESTATION STATEMENT  I have explained the potential risks, benefits, and side effects of the proposed procedure/treatment, including the risk of death to the patient and/or surrogate. Also, the possibility for the transfusion of blood or blood components (only if potential for transfusion is applicable) was discussed with risks, benefits, and alternatives. This explanation included discussion of the likelihood of the patient achieving his or her goals and any potential problems that might occur during recuperation.  Reasonable alternatives to the proposed procedure/treatment including risks, benefits, and side effects were also discussed, as were the risks related to not receiving the proposed procedure/treatment. The patient and/or surrogate have elected to proceed with the proposed procedure/treatment. .      Sedation and/or anesthesia risk, benefits, and alternatives discussed and questions answered.      Vital Signs:  Vitals:    10/27/22 1301 10/27/22 1302 10/27/22 1305 10/27/22 1320   BP:   138/72 (!) 172/98   Pulse: 82 83     Resp: 15 13 18    Temp:   98.3 °F (36.8 °C)    SpO2:   94%          GENERAL: NPO: YES  ASA: ASA 2 - Patient with mild systemic disease with no functional limitations  MALLAMPATI: II (soft palate, uvula, fauces visible)    Anesthesia Plan:  Plan for conscious sedation. / Please see anesthesia plan                Electronically signed by Keshav Babb MD on 10/27/2022 at 1:35 PM

## 2022-10-27 NOTE — PROGRESS NOTES
Pt admitted to room 4524 from PACU, report received. Hemodynamic lines leveled and zeroed. Vital signs stable.

## 2022-10-27 NOTE — PROGRESS NOTES
NEUROINTERVENTION PROCEDURE NOTE    PATIENT NAME: Constantine Albert  MRN: [id number]  : 1965  DATE OF PROCEDURE: 10/27/22    Stroke Metrics  NIHSS prior to procedure: 8  IV TPA Administered: [] Yes  [x]  No  Consent obtained: [x] Yes  []  No  by Dr. Armando Banda      Neurointerventionalist: Ivan Bobo MD  1st assistant Bunny Fay      Time Event Device Notes   0830 Access site puncture          1357     1st pass suction TICI Reperfusion grade: 2B     1410 2nd pass stent retriever  Suction TICI Reperfusion ndgndrndanddndend:nd nd2nd 1417 3rd pass suction TICI Reperfusion thgthrthathdtheth:th4th 1429 4th pass suction Solitaire X TICI Reperfusion ndgndrndanddndend:nd nd2nd 1440 Access site closure Angio seal 8F Sheath pulled and  Angioseal used to close arterial puncture. Puncture site cleansed and dry dressing applied. No bleeding, swelling or complications noted, no change in pulses. IA tPA adminstered: [] Yes  [x]  No       Time Event Dose     IA tPA administered      IA tPA administered      IA tPA administered       Post-procedure NIHSS \"unable to assess due to anesthesia/sedation\"}    1440  CT head completed. 1455  Patient transported to PACU  and handoff report given to bedside RN, right femoral site, soft non tender, assessed by bedside nurse.      Family updated: [x] Yes  []  No

## 2022-10-27 NOTE — ANESTHESIA PROCEDURE NOTES
Arterial Line:    An arterial line was placed using surface landmarks, in the OR for the following indication(s): continuous blood pressure monitoring and blood sampling needed. A 20 gauge (size), 1 and 3/8 inch (length), Arrow (type) catheter was placed, Seldinger technique not used, into the left radial artery, secured by tape. Anesthesia type: Local  Local infiltration: Injection    Events:  patient tolerated procedure well with no complications.   Resident/CRNA: RUPAL Warner - CRNA  Performed: Resident/CRNA   Preanesthetic Checklist  Completed: patient identified, IV checked, site marked, risks and benefits discussed, surgical/procedural consents, equipment checked, pre-op evaluation, timeout performed, anesthesia consent given, oxygen available and monitors applied/VS acknowledged

## 2022-10-27 NOTE — ED PROVIDER NOTES
[facility] [address]  Department of Emergency Medicine     Written by: Magalis Snider DO  Patient Name: Sree Amor  Attending Provider: Joel Francisco DO  Admit Date: 10/27/2022 12:38 PM  MRN: [id number]                   : 1965        Chief Complaint   Patient presents with    Cerebrovascular Accident     Last known well 10pm last night, having some left sided weakness in both extremities with a left sided facial droop. A&Ox4     - Chief complaint    Ms. Jordin Harvey is a 65year old female who presents to the ED due to stroke like symptoms. Patient last known well around 10 PM last night. She has been having left-sided weakness in upper and lower extremities with a left-sided facial droop since this morning. She has mild confusion is only alert and oriented to person currently. She has mild aphasia and dysarthria. She has no sensory loss. Denies any recent falls or loss of consciousness. Symptoms have been constant since onset. Remainder review of systems unable to be obtained given patient's current mental status. Review of Systems   Unable to perform ROS: Mental status change      Physical Exam  Constitutional:       General: She is in acute distress. Appearance: Normal appearance. She is normal weight. HENT:      Head: Normocephalic and atraumatic. Right Ear: External ear normal.      Left Ear: External ear normal.      Nose: Nose normal.      Mouth/Throat:      Mouth: Mucous membranes are dry. Pharynx: Oropharynx is clear. Eyes:      Extraocular Movements: Extraocular movements intact. Conjunctiva/sclera: Conjunctivae normal.   Cardiovascular:      Rate and Rhythm: Normal rate and regular rhythm. Pulses: Normal pulses. Heart sounds: Normal heart sounds. Pulmonary:      Effort: Pulmonary effort is normal. No respiratory distress. Breath sounds: Normal breath sounds. No wheezing. Abdominal:      General: Abdomen is flat.  Bowel sounds are normal. There is no distension. Palpations: Abdomen is soft. Tenderness: There is no abdominal tenderness. There is no guarding or rebound. Musculoskeletal:         General: No swelling or tenderness. Cervical back: Normal range of motion and neck supple. No rigidity. Skin:     General: Skin is warm and dry. Findings: No erythema or rash. Neurological:      Mental Status: She is alert. Cranial Nerves: No cranial nerve deficit. Sensory: No sensory deficit. Motor: Weakness (Left arm and leg) present. Comments: Confused on exam A&O x1   Psychiatric:         Mood and Affect: Mood normal.         Behavior: Behavior normal.          NIH Stroke Scale/Score at time of initial evaluation:  1A: Level of Consciousness 0 - alert; keenly responsive   1B: Ask Month and Age 2 - answers neither question correctly   1C: Tell Patient To Open and Close Eyes, then Hand  Squeeze 1 - performs one task correctly   2: Test Horizontal Extraocular Movements 0 - normal   3: Test Visual Fields 0 - no visual loss   4: Test Facial Palsy 1 - minor paralysis (flattened nasolabial fold, asymmetric on smiling)   5A: Test Left Arm Motor Drift 1 - drift, limb holds 90 (or 45) degrees but drifts down before full 10 seconds: does not hit bed   5B: Test Right Arm Motor Drift 0 - no drift, limb holds 90 (or 45) degrees for full 10 seconds   6A: Test Left Leg Motor Drift 1 - drift; leg falls by the end of the 5 second period but does not hit bed   6B: Test Right Leg Motor Drift 0 - no drift; leg holds 30 degree position for full 5 seconds   7: Test Limb Ataxia   (FNF/Heel-Shin) 1 - present in one limb   8: Test Sensation 0 - normal; no sensory loss   9: Test Language/Aphasia 1 - mild to moderate aphasia; some obvious loss of fluency or facility of comprehension without significant limitation on ideas expressed or form of expression.   Reduction of speech and/or comprehension, however, makes conversation about provided materials difficult or impossible. For example, in conversation about provided materials, examiner can identify picture or naming card content from patient's response. 10: Test Dysarthria 1 - mild to moderate, patient slurs at least some words and at worst, can be understood with some difficulty   11: Test Extinction/Inattention 0 - no abnormality   Total 9         Procedures       MDM  Number of Diagnoses or Management Options  Cerebrovascular accident (CVA), unspecified mechanism ([facility] [address]  Diagnosis management comments: This is a 65year old female who presents to the ED due to stroke like symptoms. Keri alert was called upon patient's arrival.  Head CT did not reveal any findings of intracranial hemorrhage. CTA head and neck revealed right M1 occlusion with ischemic penumbra in the distribution of the right middle cerebral artery. Dr. Azeem Espana was consulted who will be taking the patient for thrombectomy. Remainder of patient's lab work was benign and within normal limits. Patient was given a rectal aspirin prior to being taken to IR. Patient will be admitted to the neuro ICU by Dr. Jessica Acosta at this time. --------------------------------------------- PAST HISTORY ---------------------------------------------  Past Medical History:  has a past medical history of Asthma, CAD (coronary artery disease), Environmental allergies, Fracture, Heart palpitations, Hypertension, [facility] and Syncope and collapse. Past Surgical History:  has a past surgical history that includes Ankle surgery (Right, ); Leg Tendon Surgery;  section; Endometrial ablation (); and Finger surgery (Right, 2017). Social History:  reports that she quit smoking about 11 years ago. Her smoking use included cigarettes. She has a 12.00 pack-year smoking history. She has never used smokeless tobacco. She reports current alcohol use of about 42.0 standard drinks per week. She reports that she does not use drugs. Family History: family history includes Asthma in her father and mother; Diabetes in her father; High Blood Pressure in her father. The patients home medications have been reviewed.     Allergies: Tape [adhesive tape] and Pcn [penicillins]    -------------------------------------------------- RESULTS -------------------------------------------------    LABS:  Results for orders placed or performed during the hospital encounter of 10/27/22   COVID-19, Rapid    Specimen: Nasopharyngeal Swab   Result Value Ref Range    SARS-CoV-2, NAAT Not Detected Not Detected   CBC with Auto Differential   Result Value Ref Range    WBC 9.7 4.5 - 11.5 E9/L    RBC 3.71 3.50 - 5.50 E12/L    Hemoglobin 10.1 (L) 11.5 - 15.5 g/dL    Hematocrit 30.6 (L) 34.0 - 48.0 %    MCV 82.5 80.0 - 99.9 fL    MCH 27.2 26.0 - 35.0 pg    MCHC 33.0 32.0 - 34.5 %    RDW 14.1 11.5 - 15.0 fL    Platelets 213 705 - 402 E9/L    MPV 8.1 7.0 - 12.0 fL    Neutrophils % 72.1 43.0 - 80.0 %    Immature Granulocytes % 0.8 0.0 - 5.0 %    Lymphocytes % 15.3 (L) 20.0 - 42.0 %    Monocytes % 10.4 2.0 - 12.0 %    Eosinophils % 1.2 0.0 - 6.0 %    Basophils % 0.2 0.0 - 2.0 %    Neutrophils Absolute 7.02 1.80 - 7.30 E9/L    Immature Granulocytes # 0.08 E9/L    Lymphocytes Absolute 1.49 (L) 1.50 - 4.00 E9/L    Monocytes Absolute 1.01 (H) 0.10 - 0.95 E9/L    Eosinophils Absolute 0.12 0.05 - 0.50 E9/L    Basophils Absolute 0.02 0.00 - 0.20 E9/L   Troponin   Result Value Ref Range    Troponin, High Sensitivity 7 0 - 9 ng/L   APTT   Result Value Ref Range    aPTT 27.8 24.5 - 35.1 sec   Protime-INR   Result Value Ref Range    Protime 10.2 9.3 - 12.4 sec    INR 0.9    Comprehensive Metabolic Panel w/ Reflex to MG   Result Value Ref Range    Sodium 129 (L) 132 - 146 mmol/L    Potassium reflex Magnesium 3.9 3.5 - 5.0 mmol/L    Chloride 91 (L) 98 - 107 mmol/L    CO2 27 22 - 29 mmol/L    Anion Gap 11 7 - 16 mmol/L    Glucose 110 (H) 74 - 99 mg/dL    BUN 22 (H) 6 - 20 mg/dL    Creatinine 0.9 0.5 - 1.0 mg/dL    Est, Glom Filt Rate >60 >=60 mL/min/1.73    Calcium 9.6 8.6 - 10.2 mg/dL    Total Protein 7.0 6.4 - 8.3 g/dL    Albumin 4.4 3.5 - 5.2 g/dL    Total Bilirubin <0.2 0.0 - 1.2 mg/dL    Alkaline Phosphatase 138 (H) 35 - 104 U/L    ALT 12 0 - 32 U/L    AST 16 0 - 31 U/L   POCT Glucose   Result Value Ref Range    Meter Glucose 119 (H) 74 - 99 mg/dL   EKG 12 Lead   Result Value Ref Range    Ventricular Rate 87 BPM    Atrial Rate 87 BPM    P-R Interval 208 ms    QRS Duration 86 ms    Q-T Interval 372 ms    QTc Calculation (Bazett) 447 ms    P Axis 57 degrees    R Axis 3 degrees    T Axis 26 degrees       RADIOLOGY:  CT HEAD WO CONTRAST   Final Result   Diffuse atrophy likely age related   Findings compatible with small vessel ischemic changes. CTA NECK W CONTRAST   Final Result   1. Estimated stenosis of the proximal right and left internal carotid   artery by NASCET criteria is not hemodynamically significant   2. Moderate atherosclerotic disease . 3. Right M1 occlusion   Findings were called to Dr. Jackquline Lefort         This study was analyzed by the [address] ai algorithm. CT BRAIN PERFUSION   Final Result      Ischemic penumbra in the distribution of the right middle cerebral   artery      This study was analyzed by the [employer] ai algorithm. CTA HEAD W CONTRAST    (Results Pending)   XR CHEST PORTABLE    (Results Pending)   IR MECHANICAL ART THROMBECTOMY INTRACRANIAL    (Results Pending)       EKG: This EKG is signed and interpreted by me. Rate: 87  Rhythm: Sinus  Interpretation: non-specific EKG  Comparison: stable as compared to patient's most recent EKG      ------------------------- NURSING NOTES AND VITALS REVIEWED ---------------------------  Date / Time Roomed:  10/27/2022 12:38 PM  ED Bed Assignment:  LARA/LARA    The nursing notes within the ED encounter and vital signs as below have been reviewed.      Patient Vitals for the past 24 hrs:   BP Temp Pulse Resp SpO2   10/27/22 1320 (!) 172/98 -- -- -- --   10/27/22 1305 138/72 98.3 °F (36.8 °C) -- 18 94 %   10/27/22 1302 -- -- 83 13 --   10/27/22 1301 -- -- 82 15 --   10/27/22 1300 -- -- 81 21 --       Oxygen Saturation Interpretation: Normal    ------------------------------------------ PROGRESS NOTES ------------------------------------------  Re-evaluation(s):  Time: 1300  Patients symptoms show no change  Repeat physical examination is not changed    Counseling:  I have spoken with the patient and discussed todays results, in addition to providing specific details for the plan of care and counseling regarding the diagnosis and prognosis. Their questions are answered at this time and they are agreeable with the plan of admission.    --------------------------------- ADDITIONAL PROVIDER NOTES ---------------------------------  Consultations:  Time: 1330. Spoke with Dr. Nitza Arias. Discussed case. They will admit the patient. This patient's ED course included: a personal history and physicial examination, re-evaluation prior to disposition, multiple bedside re-evaluations, IV medications, cardiac monitoring, continuous pulse oximetry, and complex medical decision making and emergency management    This patient has remained hemodynamically stable during their ED course. Diagnosis:  1. Cerebrovascular accident (CVA), unspecified mechanism ([facility] [address]        Disposition:  Patient's disposition: Admit to Neuro ICU  Patient's condition is stable. Patient was seen and evaluated by myself and my attending Opal Moon DO. Assessment and Plan discussed with attending provider, please see attestation for final plan of care.      DO Lucien Richmond DO  Resident  10/27/22 0273

## 2022-10-27 NOTE — PROGRESS NOTES
Neurointerventional POST Procedure Note      Date of Service: 10/27/22      Patient Name: Otto Harrington   : 1965  Medical record number:  [id number]        Procedure Right MCA thrombectomy  Physician: Douglas Gonzales MD  Assistant: Anca Tim RT  Access:Right Femoral   Vessels injected: Right ICA , Right MCA  Hemostasis: achieved 8F angioseal  Anesthesia: Please see flowchart  Specimens: None  Blood loss: 100 ml  Contrast Material:  please see dictation in PACS  Fluoro time: Please see dictation in PACS      Diagnosis/Findings:   1. Acute Right MCA m1 occlusion with tandem small M2 thrombus also noticed  2. S/p Thrombectomy of both with TICI 3 recanalization   3. Complex plaque of the right ICA stenosis about 50%      Post operative DynaCT negative for hemorrhage. Plan:  1. Q15 min neuro checks x2 hours, Q30 for 6 hours and q1h for 24 hours and then q4h/q6h till discharge   2. Maintain - 160    3. Neurovascular checks   4. 24 hour CT head   5. STAT CT head for any neurological decline and contact me immediately  6.  Antiplatelet Recs ASA 81 mg

## 2022-10-27 NOTE — ANESTHESIA PRE PROCEDURE
Department of Anesthesiology  Preprocedure Note       Name:  Sarabjit Officer   Age:  65 y.o.  :  1965                                          MRN:  [id number]         Date:  10/27/2022      Surgeon: * Surgery not found *    Procedure:     Medications prior to admission:   Prior to Admission medications    Medication Sig Start Date End Date Taking? Authorizing Provider   LORazepam (ATIVAN) 0.5 MG tablet take 1 tablet by mouth 90 MINUTES PRIOR TO APPOINTMENT DO NOT DRIVE OR OPERATE MACHINERY FOR 24H 21   Historical Provider, MD   cyclobenzaprine (FLEXERIL) 10 MG tablet take 1 tablet by mouth twice a day 21   Historical Provider, MD   omeprazole (PRILOSEC) 20 MG delayed release capsule Take 1 capsule by mouth every morning (before breakfast) 21   Ricky Walker MD   Calcium Carbonate Antacid 400 MG CHEW Take by mouth    Historical Provider, MD   losartan-hydrochlorothiazide (HYZAAR) 100-25 MG per tablet Take 1 tablet by mouth daily  1/10/19   Historical Provider, MD   fluticasone (FLONASE) 50 MCG/ACT nasal spray 1 spray by Each Nare route daily    Historical Provider, MD   albuterol sulfate  (90 Base) MCG/ACT inhaler Inhale 2 puffs into the lungs every 6 hours as needed for Wheezing    Historical Provider, MD   naproxen (NAPROSYN) 500 MG tablet Take 1 tablet by mouth 2 times daily for 7 days 10/29/18 11/5/18  James Sierra PA-C   NONFORMULARY as needed Eye drops for allergies     Historical Provider, MD   DiphenhydrAMINE HCl (BENADRYL ALLERGY PO) Take 50 mg by mouth daily     Historical Provider, MD   furosemide (LASIX) 20 MG tablet Take 40 mg by mouth daily     Historical Provider, MD   ibuprofen (ADVIL;MOTRIN) 800 MG tablet Take 1 tablet by mouth every 8 hours as needed for Pain for 21 doses. 14   Joe Bruce MD       Current medications:    No current facility-administered medications for this encounter.      Current Outpatient Medications   Medication Sig Dispense Refill  LORazepam (ATIVAN) 0.5 MG tablet take 1 tablet by mouth 90 MINUTES PRIOR TO APPOINTMENT DO NOT DRIVE OR OPERATE MACHINERY FOR 24H      cyclobenzaprine (FLEXERIL) 10 MG tablet take 1 tablet by mouth twice a day      omeprazole (PRILOSEC) 20 MG delayed release capsule Take 1 capsule by mouth every morning (before breakfast) 30 capsule 0    Calcium Carbonate Antacid 400 MG CHEW Take by mouth      losartan-hydrochlorothiazide (HYZAAR) 100-25 MG per tablet Take 1 tablet by mouth daily   0    fluticasone (FLONASE) 50 MCG/ACT nasal spray 1 spray by Each Nare route daily      albuterol sulfate  (90 Base) MCG/ACT inhaler Inhale 2 puffs into the lungs every 6 hours as needed for Wheezing      naproxen (NAPROSYN) 500 MG tablet Take 1 tablet by mouth 2 times daily for 7 days 14 tablet 0    NONFORMULARY as needed Eye drops for allergies       DiphenhydrAMINE HCl (BENADRYL ALLERGY PO) Take 50 mg by mouth daily       furosemide (LASIX) 20 MG tablet Take 40 mg by mouth daily       ibuprofen (ADVIL;MOTRIN) 800 MG tablet Take 1 tablet by mouth every 8 hours as needed for Pain for 21 doses. 21 tablet 0       Allergies:     Allergies   Allergen Reactions    Tape Lourena Irving Tape]      Tears skin    Pcn [Penicillins] Nausea And Vomiting       Problem List:    Patient Active Problem List   Diagnosis Code    Abscess L02.91       Past Medical History:        Diagnosis Date    Asthma     CAD (coronary artery disease)      had MI per patient  / follows with Dr. Primo Peguero Environmental allergies     Fracture     right 5th metacarpal    Heart palpitations     Hypertension     MI [facility]my[facility] infarction) ([facility] [address]     Syncope and collapse        Past Surgical History:        Procedure Laterality Date    ANKLE SURGERY Right 2013     SECTION      x2    ENDOMETRIAL ABLATION  2014    FINGER SURGERY Right 2017    right 5th metacarpal open reduction internal fixation    LEG TENDON SURGERY      right leg 3/24/14       Social History:    Social History     Tobacco Use    Smoking status: Former     Packs/day: 2.00     Years: 6.00     Pack years: 12.00     Types: Cigarettes     Quit date: 2011     Years since quittin.2    Smokeless tobacco: Never   Substance Use Topics    Alcohol use:  Yes     Alcohol/week: 42.0 standard drinks     Types: 42 Cans of beer per week     Comment: daily                                Counseling given: Not Answered      Vital Signs (Current):   Vitals:    10/27/22 1300 10/27/22 1301 10/27/22 1302 10/27/22 1305   BP:    138/72   Pulse: 81 82 83    Resp: 21 15 13 18   Temp:    36.8 °C (98.3 °F)   SpO2:    94%                                              BP Readings from Last 3 Encounters:   10/27/22 138/72   21 (!) 181/92   21 (!) 179       NPO Status:  > 8 hours                                                                               BMI:   Wt Readings from Last 3 Encounters:   21 193 lb (87.5 kg)   21 196 lb (88.9 kg)   21 165 lb (74.8 kg)     There is no height or weight on file to calculate BMI.    CBC:   Lab Results   Component Value Date/Time    WBC 9.7 10/27/2022 12:39 PM    RBC 3.71 10/27/2022 12:39 PM    HGB 10.1 10/27/2022 12:39 PM    HCT 30.6 10/27/2022 12:39 PM    MCV 82.5 10/27/2022 12:39 PM    RDW 14.1 10/27/2022 12:39 PM     10/27/2022 12:39 PM       CMP:   Lab Results   Component Value Date/Time     2021 04:41 PM    K 3.3 2021 04:41 PM    CL 86 2021 04:41 PM    CO2 22 2021 04:41 PM    BUN 12 2021 04:41 PM    CREATININE 0.7 2021 04:41 PM    GFRAA >60 2021 04:41 PM    LABGLOM >60 2021 04:41 PM    GLUCOSE 74 2021 04:41 PM    GLUCOSE 90 2011 05:25 AM    PROT 7.5 2021 02:28 PM    CALCIUM 8.6 2021 04:41 PM    BILITOT 0.3 2021 02:28 PM    ALKPHOS 132 2021 02:28 PM    AST 44 2021 02:28 PM    ALT 29 2021 02:28 PM       POC Tests: No results for input(s): POCGLU, POCNA, POCK, POCCL, POCBUN, POCHEMO, POCHCT in the last 72 hours. Coags:   Lab Results   Component Value Date/Time    PROTIME 10.2 10/27/2022 12:39 PM    PROTIME 11.1 05/24/2011 02:00 AM    INR 0.9 10/27/2022 12:39 PM    APTT 27.8 10/27/2022 12:39 PM       HCG (If Applicable):   Lab Results   Component Value Date    PREGTESTUR NEGATIVE 08/26/2016    PREGSERUM NEGATIVE 05/25/2011        ABGs: No results found for: PHART, PO2ART, VCQ7TLR, MHZ6SXC, BEART, L7XNJITU     Type & Screen (If Applicable):  No results found for: LABABO, LABRH    Drug/Infectious Status (If Applicable):  No results found for: HIV, HEPCAB    COVID-19 Screening (If Applicable): No results found for: COVID19        Anesthesia Evaluation  Patient summary reviewed and Nursing notes reviewed no history of anesthetic complications:   Airway: Mallampati: III  TM distance: >3 FB   Neck ROM: full  Mouth opening: > = 3 FB   Dental: normal exam         Pulmonary:normal exam    (+) asthma:                            Cardiovascular:    (+) hypertension:, valvular problems/murmurs:, past MI: > 6 months, CAD:,                   Neuro/Psych:   (+) CVA:,              ROS comment: Patient states she was falling and losing her balance, no other symptoms GI/Hepatic/Renal: Neg GI/Hepatic/Renal ROS            Endo/Other: Negative Endo/Other ROS                    Abdominal:   (+) obese,           Vascular: negative vascular ROS. Other Findings:           Anesthesia Plan      MAC     ASA 3 - emergent       Induction: intravenous. Anesthetic plan and risks discussed with patient. Plan discussed with attending. RUPAL Lawson - CRNA   10/27/2022      Pt seen, examined, chart reviewed, plan discussed.   Svetlana Lance MD  10/27/2022  1:54 PM

## 2022-10-28 ENCOUNTER — APPOINTMENT (OUTPATIENT)
Dept: CT IMAGING | Age: 57
DRG: 024 | End: 2022-10-28
Payer: MEDICARE

## 2022-10-28 LAB
ALBUMIN SERPL-MCNC: 4 G/DL (ref 3.5–5.2)
ALP BLD-CCNC: 105 U/L (ref 35–104)
ALT SERPL-CCNC: 12 U/L (ref 0–32)
ANION GAP SERPL CALCULATED.3IONS-SCNC: 15 MMOL/L (ref 7–16)
AST SERPL-CCNC: 17 U/L (ref 0–31)
BASOPHILS ABSOLUTE: 0.01 E9/L (ref 0–0.2)
BASOPHILS RELATIVE PERCENT: 0.1 % (ref 0–2)
BILIRUB SERPL-MCNC: <0.2 MG/DL (ref 0–1.2)
BILIRUBIN DIRECT: <0.2 MG/DL (ref 0–0.3)
BILIRUBIN, INDIRECT: NORMAL MG/DL (ref 0–1)
BUN BLDV-MCNC: 14 MG/DL (ref 6–20)
CALCIUM SERPL-MCNC: 9.4 MG/DL (ref 8.6–10.2)
CHLORIDE BLD-SCNC: 92 MMOL/L (ref 98–107)
CO2: 21 MMOL/L (ref 22–29)
CREAT SERPL-MCNC: 0.7 MG/DL (ref 0.5–1)
EKG ATRIAL RATE: 87 BPM
EKG P AXIS: 57 DEGREES
EKG P-R INTERVAL: 208 MS
EKG Q-T INTERVAL: 372 MS
EKG QRS DURATION: 86 MS
EKG QTC CALCULATION (BAZETT): 447 MS
EKG R AXIS: 3 DEGREES
EKG T AXIS: 26 DEGREES
EKG VENTRICULAR RATE: 87 BPM
EOSINOPHILS ABSOLUTE: 0 E9/L (ref 0.05–0.5)
EOSINOPHILS RELATIVE PERCENT: 0 % (ref 0–6)
GFR SERPL CREATININE-BSD FRML MDRD: >60 ML/MIN/1.73
GLUCOSE BLD-MCNC: 133 MG/DL (ref 74–99)
HCT VFR BLD CALC: 25 % (ref 34–48)
HEMOGLOBIN: 8.4 G/DL (ref 11.5–15.5)
IMMATURE GRANULOCYTES #: 0.14 E9/L
IMMATURE GRANULOCYTES %: 1.4 % (ref 0–5)
LV EF: 63 %
LVEF MODALITY: NORMAL
LYMPHOCYTES ABSOLUTE: 0.75 E9/L (ref 1.5–4)
LYMPHOCYTES RELATIVE PERCENT: 7.4 % (ref 20–42)
MCH RBC QN AUTO: 27.5 PG (ref 26–35)
MCHC RBC AUTO-ENTMCNC: 33.6 % (ref 32–34.5)
MCV RBC AUTO: 81.7 FL (ref 80–99.9)
MONOCYTES ABSOLUTE: 0.71 E9/L (ref 0.1–0.95)
MONOCYTES RELATIVE PERCENT: 7 % (ref 2–12)
NEUTROPHILS ABSOLUTE: 8.49 E9/L (ref 1.8–7.3)
NEUTROPHILS RELATIVE PERCENT: 84.1 % (ref 43–80)
PDW BLD-RTO: 14.3 FL (ref 11.5–15)
PLATELET # BLD: 380 E9/L (ref 130–450)
PMV BLD AUTO: 8.4 FL (ref 7–12)
POTASSIUM SERPL-SCNC: 3.6 MMOL/L (ref 3.5–5)
RBC # BLD: 3.06 E12/L (ref 3.5–5.5)
SODIUM BLD-SCNC: 128 MMOL/L (ref 132–146)
TOTAL PROTEIN: 6.4 G/DL (ref 6.4–8.3)
WBC # BLD: 10.1 E9/L (ref 4.5–11.5)

## 2022-10-28 PROCEDURE — 6370000000 HC RX 637 (ALT 250 FOR IP): Performed by: PSYCHIATRY & NEUROLOGY

## 2022-10-28 PROCEDURE — 70450 CT HEAD/BRAIN W/O DYE: CPT

## 2022-10-28 PROCEDURE — 80053 COMPREHEN METABOLIC PANEL: CPT

## 2022-10-28 PROCEDURE — 93010 ELECTROCARDIOGRAM REPORT: CPT | Performed by: INTERNAL MEDICINE

## 2022-10-28 PROCEDURE — 93306 TTE W/DOPPLER COMPLETE: CPT

## 2022-10-28 PROCEDURE — 97535 SELF CARE MNGMENT TRAINING: CPT

## 2022-10-28 PROCEDURE — 82248 BILIRUBIN DIRECT: CPT

## 2022-10-28 PROCEDURE — 97530 THERAPEUTIC ACTIVITIES: CPT

## 2022-10-28 PROCEDURE — 2580000003 HC RX 258: Performed by: ANESTHESIOLOGY

## 2022-10-28 PROCEDURE — 6360000002 HC RX W HCPCS: Performed by: CLINICAL NURSE SPECIALIST

## 2022-10-28 PROCEDURE — 2580000003 HC RX 258: Performed by: CLINICAL NURSE SPECIALIST

## 2022-10-28 PROCEDURE — 85025 COMPLETE CBC W/AUTO DIFF WBC: CPT

## 2022-10-28 PROCEDURE — 97166 OT EVAL MOD COMPLEX 45 MIN: CPT

## 2022-10-28 PROCEDURE — 6360000002 HC RX W HCPCS: Performed by: NURSE PRACTITIONER

## 2022-10-28 PROCEDURE — 97162 PT EVAL MOD COMPLEX 30 MIN: CPT

## 2022-10-28 PROCEDURE — 2580000003 HC RX 258: Performed by: PSYCHIATRY & NEUROLOGY

## 2022-10-28 PROCEDURE — 2000000000 HC ICU R&B

## 2022-10-28 PROCEDURE — 2500000003 HC RX 250 WO HCPCS: Performed by: CLINICAL NURSE SPECIALIST

## 2022-10-28 PROCEDURE — 6370000000 HC RX 637 (ALT 250 FOR IP): Performed by: CLINICAL NURSE SPECIALIST

## 2022-10-28 RX ORDER — PHENOBARBITAL SODIUM 65 MG/ML
130 INJECTION INTRAMUSCULAR EVERY 6 HOURS
Status: DISCONTINUED | OUTPATIENT
Start: 2022-10-29 | End: 2022-10-28

## 2022-10-28 RX ORDER — MORPHINE SULFATE 2 MG/ML
2 INJECTION, SOLUTION INTRAMUSCULAR; INTRAVENOUS EVERY 5 MIN PRN
Status: DISCONTINUED | OUTPATIENT
Start: 2022-10-28 | End: 2022-10-29

## 2022-10-28 RX ORDER — MORPHINE SULFATE 2 MG/ML
1 INJECTION, SOLUTION INTRAMUSCULAR; INTRAVENOUS EVERY 5 MIN PRN
Status: DISCONTINUED | OUTPATIENT
Start: 2022-10-28 | End: 2022-10-29

## 2022-10-28 RX ORDER — LOSARTAN POTASSIUM 50 MG/1
100 TABLET ORAL DAILY
Status: DISCONTINUED | OUTPATIENT
Start: 2022-10-28 | End: 2022-10-30 | Stop reason: HOSPADM

## 2022-10-28 RX ORDER — SODIUM CHLORIDE 0.9 % (FLUSH) 0.9 %
5-40 SYRINGE (ML) INJECTION PRN
Status: DISCONTINUED | OUTPATIENT
Start: 2022-10-28 | End: 2022-10-29

## 2022-10-28 RX ORDER — SODIUM CHLORIDE 9 MG/ML
INJECTION, SOLUTION INTRAVENOUS PRN
Status: DISCONTINUED | OUTPATIENT
Start: 2022-10-28 | End: 2022-10-29

## 2022-10-28 RX ORDER — PANTOPRAZOLE SODIUM 40 MG/1
40 TABLET, DELAYED RELEASE ORAL
Status: DISCONTINUED | OUTPATIENT
Start: 2022-10-28 | End: 2022-10-30 | Stop reason: HOSPADM

## 2022-10-28 RX ORDER — PHENOBARBITAL SODIUM 65 MG/ML
130 INJECTION INTRAMUSCULAR EVERY 6 HOURS
Status: DISCONTINUED | OUTPATIENT
Start: 2022-10-28 | End: 2022-10-28

## 2022-10-28 RX ORDER — HYDROCHLOROTHIAZIDE 25 MG/1
25 TABLET ORAL DAILY
Status: DISCONTINUED | OUTPATIENT
Start: 2022-10-28 | End: 2022-10-30 | Stop reason: HOSPADM

## 2022-10-28 RX ORDER — SODIUM CHLORIDE 0.9 % (FLUSH) 0.9 %
5-40 SYRINGE (ML) INJECTION EVERY 12 HOURS SCHEDULED
Status: DISCONTINUED | OUTPATIENT
Start: 2022-10-28 | End: 2022-10-30 | Stop reason: HOSPADM

## 2022-10-28 RX ORDER — AMLODIPINE BESYLATE 5 MG/1
5 TABLET ORAL DAILY
Status: DISCONTINUED | OUTPATIENT
Start: 2022-10-28 | End: 2022-10-30 | Stop reason: HOSPADM

## 2022-10-28 RX ORDER — PHENOBARBITAL SODIUM 65 MG/ML
260 INJECTION INTRAMUSCULAR EVERY 6 HOURS
Status: DISCONTINUED | OUTPATIENT
Start: 2022-10-28 | End: 2022-10-28

## 2022-10-28 RX ORDER — PHENOBARBITAL SODIUM 65 MG/ML
130 INJECTION INTRAMUSCULAR EVERY 6 HOURS
Status: DISCONTINUED | OUTPATIENT
Start: 2022-10-28 | End: 2022-10-29

## 2022-10-28 RX ORDER — MEPERIDINE HYDROCHLORIDE 25 MG/ML
12.5 INJECTION INTRAMUSCULAR; INTRAVENOUS; SUBCUTANEOUS EVERY 5 MIN PRN
Status: DISCONTINUED | OUTPATIENT
Start: 2022-10-28 | End: 2022-10-29

## 2022-10-28 RX ORDER — HYDRALAZINE HYDROCHLORIDE 20 MG/ML
10 INJECTION INTRAMUSCULAR; INTRAVENOUS EVERY 10 MIN PRN
Status: DISCONTINUED | OUTPATIENT
Start: 2022-10-28 | End: 2022-10-30 | Stop reason: HOSPADM

## 2022-10-28 RX ORDER — LOSARTAN POTASSIUM AND HYDROCHLOROTHIAZIDE 25; 100 MG/1; MG/1
1 TABLET ORAL DAILY
Status: DISCONTINUED | OUTPATIENT
Start: 2022-10-28 | End: 2022-10-28

## 2022-10-28 RX ORDER — LABETALOL HYDROCHLORIDE 5 MG/ML
10 INJECTION, SOLUTION INTRAVENOUS EVERY 10 MIN PRN
Status: DISCONTINUED | OUTPATIENT
Start: 2022-10-28 | End: 2022-10-30 | Stop reason: HOSPADM

## 2022-10-28 RX ADMIN — ATORVASTATIN CALCIUM 40 MG: 40 TABLET, FILM COATED ORAL at 20:24

## 2022-10-28 RX ADMIN — SODIUM CHLORIDE, PRESERVATIVE FREE 10 ML: 5 INJECTION INTRAVENOUS at 21:22

## 2022-10-28 RX ADMIN — SODIUM CHLORIDE, PRESERVATIVE FREE 10 ML: 5 INJECTION INTRAVENOUS at 08:36

## 2022-10-28 RX ADMIN — SODIUM CHLORIDE, PRESERVATIVE FREE 10 ML: 5 INJECTION INTRAVENOUS at 20:25

## 2022-10-28 RX ADMIN — PHENOBARBITAL SODIUM 260 MG: 65 INJECTION INTRAMUSCULAR; INTRAVENOUS at 09:31

## 2022-10-28 RX ADMIN — PHENOBARBITAL SODIUM 130 MG: 65 INJECTION INTRAMUSCULAR; INTRAVENOUS at 20:25

## 2022-10-28 RX ADMIN — DIAZEPAM 2 MG: 2 TABLET ORAL at 05:08

## 2022-10-28 RX ADMIN — AMLODIPINE BESYLATE 5 MG: 5 TABLET ORAL at 09:31

## 2022-10-28 RX ADMIN — Medication 100 MG: at 08:36

## 2022-10-28 RX ADMIN — LOSARTAN POTASSIUM 100 MG: 50 TABLET, FILM COATED ORAL at 09:31

## 2022-10-28 RX ADMIN — LORAZEPAM 2 MG: 2 INJECTION INTRAMUSCULAR; INTRAVENOUS at 03:01

## 2022-10-28 RX ADMIN — ASPIRIN 81 MG 81 MG: 81 TABLET ORAL at 17:41

## 2022-10-28 RX ADMIN — HYDROCHLOROTHIAZIDE 25 MG: 25 TABLET ORAL at 09:31

## 2022-10-28 RX ADMIN — LABETALOL HYDROCHLORIDE 10 MG: 5 INJECTION INTRAVENOUS at 23:30

## 2022-10-28 RX ADMIN — SODIUM CHLORIDE 0.2 MCG/KG/HR: 9 INJECTION, SOLUTION INTRAVENOUS at 23:28

## 2022-10-28 RX ADMIN — PHENOBARBITAL SODIUM 130 MG: 65 INJECTION INTRAMUSCULAR; INTRAVENOUS at 15:04

## 2022-10-28 RX ADMIN — MULTIVITAMIN TABLET 1 TABLET: TABLET at 08:36

## 2022-10-28 RX ADMIN — PANTOPRAZOLE SODIUM 40 MG: 40 TABLET, DELAYED RELEASE ORAL at 09:31

## 2022-10-28 RX ADMIN — FOLIC ACID 1 MG: 1 TABLET ORAL at 08:36

## 2022-10-28 ASSESSMENT — ENCOUNTER SYMPTOMS
SHORTNESS OF BREATH: 0
ABDOMINAL DISTENTION: 0

## 2022-10-28 NOTE — CARE COORDINATION
10/28/2022 - Care Coordination - admitted for acute ischemic MCA stroke. Interventional neuro consult - S/P thrombectomy of MCA m1 occlusion and small M2 thrombus. IV phenobarbital q6h. PT 18/24, OT 17/24. Spoke with pt in room. PCP is Dr Chaitanya Tidwell. Pharmacy is [address] [place] [address]. Lives with her fikali Whitten in a 1 floor home with 2 steps to enter. Denies hx HHC, GENO/ARU. DME - cane. Her plan is to return home when medically stable. Her fiance will provide transportation home. SW/CM will follow.

## 2022-10-28 NOTE — PROGRESS NOTES
Physical Therapy  Physical Therapy Initial Assessment     Name: Ashley Gray  : 1965  MRN: [id number]      Date of Service: 10/28/2022    Evaluating PT:  Fernando Daigle, PT, DPT [id number]    Room #:  9553/2667-V  Diagnosis:  Acute ischemic right MCA stroke [facility]) [I63.511]  Acute cerebrovascular accident (CVA) ([facility] [address] [I63.9]  Cerebrovascular accident (CVA), unspecified mechanism ([facility] [address] [I63.9]  PMHx/PSHx:    Past Medical History:   Diagnosis Date    Asthma     CAD (coronary artery disease)      had MI per patient  / follows with Dr. Adriana Cooper    Environmental allergies     Fracture     right 5th metacarpal    Heart palpitations     Hypertension     [facility]     Syncope and collapse      Procedure/Surgery:  10/27 R MCA thrombectomy   Precautions:  Falls, SBP < 160, chair alarm  Equipment Needs:  TBD    SUBJECTIVE:    Pt lives with significant other in a 1 story home with 1 stairs to enter and no rail. Pt ambulated without device and was independent PTA. OBJECTIVE:   Initial Evaluation  Date: 10/28/22 Treatment Short Term/ Long Term   Goals   AM-PAC 6 Clicks 99/93     Was pt agreeable to Eval/treatment? Yes     Does pt have pain?  No c/o pain     Bed Mobility  Rolling: Supervision  Supine to sit: Kristin  Sit to supine: NT  Scooting: Kristin  Mod Independent   Transfers Sit to stand: Kristin  Stand to sit: Kristin  Stand pivot: Kristin no device  Independent   Ambulation   70 feet, 150 feet with Kristin no device   >400 feet Independently   Stair negotiation: ascended and descended NT  >10 steps with 1 rail Mod Independent   ROM BUE:  Defer to OT note  BLE:  WFL     Strength BUE:  Defer to OT note  BLE:  4/5  Increase by 1/3 MMT grade   Balance Sitting EOB:  Kristin dynamic  Dynamic Standing:  Kristin no device  Sitting EOB:  Independent  Dynamic Standing:  Independent     Pt is A & O x 4  CAM-ICU: NT  RASS: 0  Sensation:  No reported paresthesias  Edema:  None    Vitals:  Heart Rate at rest 105 bpm Heart Rate post session 106 bpm   SpO2 at rest -% SpO2 post session 97%   Blood Pressure at rest 126/65 mmHg Blood Pressure post session 122/85 mmHg       Functional Status Score-Intensive Care Unit (FSS-ICU)   Rolling 5/7   Supine to sit transfer 4/7   Unsupported sitting  4/7   Sit to stand transfers 4/7   Ambulation 4/7   Total  21/35       Therapeutic Exercises:  NA    Patient education  Pt educated on safety    Patient response to education:   Pt verbalized understanding Pt demonstrated skill Pt requires further education in this area   x x x     ASSESSMENT:    Conditions Requiring Skilled Therapeutic Intervention:    [x]Decreased strength     []Decreased ROM  [x]Decreased functional mobility  [x]Decreased balance   [x]Decreased endurance   []Decreased posture  []Decreased sensation  []Decreased coordination   []Decreased vision  [x]Decreased safety awareness   []Increased pain       Comments:  NP reported pt was medically stable. Pt was in bed upon arrival, agreeable to initial evaluation. Pt was mildly impulsive initially but mostly due to eagerness to get OOB. Pt ambulated with mild unsteadiness and experienced one LOB that required assistance to correct. Pt was left in chair with all needs met and call light in reach. All lines remained intact and chair alarm on. Treatment:  Patient practiced and was instructed in the following treatment:    Bed mobility training - pt given verbal and tactile cues to facilitate proper sequencing and safety during rolling and supine>sit as well as provided with physical assistance. Sitting EOB for >5 minutes for upright tolerance, postural awareness and BLE ROM  Transfer training - pt was given verbal and tactile cues to facilitate proper hand placement, technique and safety during sit to stand, stand to sit and stand pivot transfers as well as provided with physical assistance.   Gait training- pt was given verbal and tactile cues to facilitate safety and balance during ambulation as well as provided with physical assistance. Pt's/ family goals   1. Return home    Prognosis is good for reaching above PT goals. Patient and or family understand(s) diagnosis, prognosis, and plan of care. yes    PHYSICAL THERAPY PLAN OF CARE:    PT POC is established based on physician order and patient diagnosis     Referring provider/PT Order:  Ant Tang MD /10/27/22 1515 PT eval and treat  Diagnosis:  Acute ischemic right MCA stroke ([facility] [address] [I63.511]  Acute cerebrovascular accident (CVA) ([address] [I63.9]  Cerebrovascular accident (CVA), unspecified mechanism ([address] [I63.9]  Specific instructions for next treatment:  Progress activity    Current Treatment Recommendations:     [x] Strengthening to improve independence with functional mobility   [] ROM to improve independence with functional mobility   [x] Balance Training to improve static/dynamic balance and to reduce fall risk  [x] Endurance Training to improve activity tolerance during functional mobility   [x] Transfer Training to improve safety and independence with all functional transfers   [x] Gait Training to improve gait mechanics, endurance and asses need for appropriate assistive device  [x] Stair Training in preparation for safe discharge home and/or into the community   [] Positioning to prevent skin breakdown and contractures  [x] Safety and Education Training   [x] Patient/Caregiver Education   [] HEP  [] Other     PT long term treatment goals are located in above grid    Frequency of treatments: 2-5x/week x 1-2 weeks. Time in  1345  Time out  1410    Total Treatment Time  10 minutes     Evaluation Time includes thorough review of current medical information, gathering information on past medical history/social history and prior level of function, completion of standardized testing/informal observation of tasks, assessment of data and education on plan of care and goals.     CPT codes:  [] Low Complexity PT evaluation 15223  [x] Moderate Complexity PT evaluation 68875  [] High Complexity PT evaluation [id number]  [] PT Re-evaluation [id number]  [] Gait training 87474 - minutes  [] Manual therapy 99365 - minutes  [x] Therapeutic activities 66163 10 minutes  [] Therapeutic exercises 91818 - minutes  [] Neuromuscular reeducation 41524 - minutes     Keysha Myers, PT, DPT  [id number]

## 2022-10-28 NOTE — PLAN OF CARE
Problem: Discharge Planning  Goal: Discharge to home or other facility with appropriate resources  10/28/2022 1042 by Marylen Bring, RN  Outcome: Progressing     Problem: Skin/Tissue Integrity  Goal: Absence of new skin breakdown  Description: 1. Monitor for areas of redness and/or skin breakdown  2. Assess vascular access sites hourly  3. Every 4-6 hours minimum:  Change oxygen saturation probe site  4. Every 4-6 hours:  If on nasal continuous positive airway pressure, respiratory therapy assess nares and determine need for appliance change or resting period.   10/28/2022 1042 by Marylen Bring, RN  Outcome: Progressing     Problem: Safety - Adult  Goal: Free from fall injury  10/28/2022 1042 by Marylen Bring, RN  Outcome: Progressing     Problem: ABCDS Injury Assessment  Goal: Absence of physical injury  10/28/2022 1042 by Marylen Bring, RN  Outcome: Progressing     Problem: Neurosensory - Adult  Goal: Achieves stable or improved neurological status  10/28/2022 1042 by Marylen Bring, RN  Outcome: Progressing     Problem: Neurosensory - Adult  Goal: Achieves maximal functionality and self care  10/28/2022 1042 by Marylen Bring, RN  Outcome: Progressing     Problem: Cardiovascular - Adult  Goal: Maintains optimal cardiac output and hemodynamic stability  10/28/2022 1042 by Marylen Bring, RN  Outcome: Progressing     Problem: Skin/Tissue Integrity - Adult  Goal: Skin integrity remains intact  10/28/2022 1042 by Marylen Bring, RN  Outcome: Progressing     Problem: Musculoskeletal - Adult  Goal: Return mobility to safest level of function  10/28/2022 1042 by Marylen Bring, RN  Outcome: Progressing

## 2022-10-28 NOTE — PLAN OF CARE
Problem: Discharge Planning  Goal: Discharge to home or other facility with appropriate resources  Outcome: Progressing  Flowsheets (Taken 10/27/2022 1645 by Ariane Altamirano RN)  Discharge to home or other facility with appropriate resources: Identify barriers to discharge with patient and caregiver     Problem: Skin/Tissue Integrity  Goal: Absence of new skin breakdown  Description: 1. Monitor for areas of redness and/or skin breakdown  2. Assess vascular access sites hourly  3. Every 4-6 hours minimum:  Change oxygen saturation probe site  4. Every 4-6 hours:  If on nasal continuous positive airway pressure, respiratory therapy assess nares and determine need for appliance change or resting period. Outcome: Progressing     Problem: ABCDS Injury Assessment  Goal: Absence of physical injury  Outcome: Progressing  Flowsheets (Taken 10/27/2022 2256)  Absence of Physical Injury: Implement safety measures based on patient assessment     Problem: Neurosensory - Adult  Goal: Achieves stable or improved neurological status  Outcome: Progressing  Flowsheets (Taken 10/27/2022 2256)  Achieves stable or improved neurological status:   Assess for and report changes in neurological status   Maintain blood pressure and fluid volume within ordered parameters to optimize cerebral perfusion and minimize risk of hemorrhage   Monitor temperature, glucose, and sodium.  Initiate appropriate interventions as ordered     Problem: Neurosensory - Adult  Goal: Achieves maximal functionality and self care  Outcome: Progressing  Flowsheets (Taken 10/27/2022 2256)  Achieves maximal functionality and self care:   Monitor swallowing and airway patency with patient fatigue and changes in neurological status   Encourage and assist patient to increase activity and self care with guidance from physical therapy/occupational therapy   Encourage visually impaired, hearing impaired and aphasic patients to use assistive/communication devices Problem: Cardiovascular - Adult  Goal: Maintains optimal cardiac output and hemodynamic stability  Outcome: Progressing  Flowsheets (Taken 10/27/2022 2256)  Maintains optimal cardiac output and hemodynamic stability: Monitor blood pressure and heart rate     Problem: Skin/Tissue Integrity - Adult  Goal: Skin integrity remains intact  Outcome: Progressing  Flowsheets  Taken 10/27/2022 2256 by Zo Padilla RN  Skin Integrity Remains Intact:   Monitor for areas of redness and/or skin breakdown   Assess vascular access sites hourly  Taken 10/27/2022 1645 by Ariane Altamirano RN  Skin Integrity Remains Intact: Monitor for areas of redness and/or skin breakdown     Problem: Musculoskeletal - Adult  Goal: Return mobility to safest level of function  Outcome: Progressing  Flowsheets (Taken 10/27/2022 2256)  Return Mobility to Safest Level of Function:   Assess patient stability and activity tolerance for standing, transferring and ambulating with or without assistive devices   Assist with transfers and ambulation using safe patient handling equipment as needed   Obtain physical therapy/occupational therapy consults as needed   Instruct patient/family in ordered activity level     Problem: Safety - Adult  Goal: Free from fall injury  Flowsheets (Taken 10/27/2022 2256)  Free From Fall Injury: Instruct family/caregiver on patient safety

## 2022-10-28 NOTE — PROGRESS NOTES
[address]       Date:10/28/2022                                                               Patient Name: Otto Harrington  MRN: [id number]  : 1965  Room: [address] [place] [address]    Evaluating OT: Law Navarro OTR/L [id number]    Referring Provider: Nael Dumont MD   Specific Provider Orders/Date: OT eval and treat (10/27/22)       Diagnosis: Acute ischemic right MCA stroke ([facility] [I63.511]  Acute cerebrovascular accident (CVA) ([facility] [address] [I63.9]  Cerebrovascular accident (CVA), unspecified mechanism ([facility] [address] [I63.9]        Reason for admission: stroke like symptoms; L sided weakness with difficulty walking    Surgery/Procedures: N/A     Pertinent Medical History: Asthma, CAD, HTN, MI, syncope and collapse        *Precautions:  Fall Risk, L hemiparesis, -160, soft and bite size diet     Assessment of current deficits   [x] Functional mobility  [x]ADLs  [x] Strength               []Cognition   [x] Functional transfers   [x] IADLs         [x] Safety Awareness   [x]Endurance   [x] Fine Coordination        [] ROM     [] Vision/perception   []Sensation    []Gross Motor Coordination [x] Balance   [] Delirium                  []Motor Control     [] Communication    OT PLAN OF CARE   OT POC based on physician orders, patient diagnosis and results of clinical assessment.        Frequency/Duration: 1-3 days/wk for 1-2 weeks PRN    Specific OT Treatment to include:   ADL retraining/adapted techniques and AE recommendations to increase functional independence within precautions                    Energy conservation techniques to improve tolerance for selfcare routine   Functional transfer/mobility training/DME recommendations for increased independence, safety and fall prevention         Patient/family education to increase safety and functional independence within precautions Environmental modifications for safe mobility and completion of ADLs                           Therapeutic activity to improve functional performance during ADLs/IADLs                                         Therapeutic exercise to improve tolerance and functional strength for ADLs   Balance retraining exercises/tasks for facilitation of postural control with dynamic challenges during ADLs . Positioning to improve functional independence  Neuromuscular re-education: facilitation of righting/equilibrium reactions, normalization muscle tone/facilitation active functional movement                      Delirium prevention/treatment    Modified Pernell Scale   Score     Description  0             No symptoms  1             No significant disability despite symptoms  2             Slight disability; able to look after own affairs  3             Moderate disability; able to ambulate without assist/ requires assist with ADLs  4             Moderate/Severe disability;requires assist to ambulate/assist with ADLs  5             Severe disability;bedridden/incontinent   6               Score:   4    Recommended Adaptive Equipment: TBA:     Home Living: Pt lives with fiance  in a 1 floor plan with 1 step(s) to enter and no rail(s); bed/bath on first floor  Bathroom setup: tub/shower  Equipment owned: no DME    Prior Level of Function: IND with ADLs;  IND with IADLs. No device for ambulation. Driving: yes  Occupation: no    Pain Level: pt c/o 0/10  pain  this session    Cognition: A&O: 2-3/4  (cues for name of hospital- Reporting \"[facility]\"  Follows 1-2 step commands appropriately. Min redirection for safety/ fall prevention. Pt is impulsive.     Memory: fair   Comprehension fair   Problem solving: fair   Judgement/safety: fair-               Communication skills: WFL           Vision:WFL; reports no vision changes             Glasses:yes                                                   Hearing: WFL     RASS: 0  CAM-ICU: (NT) Delirium    UE Assessment:  Hand Dominance: Right [x]  Left []     ROM Strength STM goal: PRN   RUE  WFL 4/5      LUE WFL; Min incoordination  4-/5 4/5       Sensation: No c/o numbness/tingling in extremities. Tone: WNL   Edema: WFL     Functional Assessment:  AM-PAC Daily Activity Raw Score: 17/24   Initial Eval Status  Date: 10/28/22 Treatment Status  Date: STGs = LTGs  Time frame: 7-14 days   Feeding S; set up                        Fernanda  while seated up in chair to increase activity tolerance        Grooming Min A  standing sink level                        Fernanda   while standing sink level requiring no device for balance and demonstrating G tolerance      UB dressing/bathing Min A                        Fernanda       LB dressing/bathing Min A  seated EOB                        Fernanda   using AE as needed for safe reach/ energy conservation       Toileting Min A  (dynamic standing balance during hygiene)                        Fernanda     Bed Mobility  Supine to sit:   Min A    Sit to supine:   NT                        Fernanda  in prep of ADL tasks & transfers   Functional Transfers Sit to stand:   Min A    Stand to sit:   Min A    Commode: Kristin (low seat)                        Fernanda  sit<>stand/functional bathroom transfers using AD/DME as needed for balance and safety   Functional Mobility Min A  No device; min LOB                       Fernanda   functional/bathroom mobility using AD as needed & demonstrating G safety     Balance Sitting:     Static:  S    Dynamic:SBA  Standing: Min A  Fernanda dynamic sitting balance; Fernanda dynamic standing balance  during ADL tasks & transfers   Endurance/  Activity Tolerance   F tolerance with light to moderate activity.    G   tolerance with moderate activity/self care routine   Visual/  Perceptual   WFL                     Vitals:  Heart Rate at rest 105 bpm Heart Rate post session 106 bpm   SpO2 at rest -% SpO2 post session 97%   Blood Pressure at rest 126/65 mmHg Blood Pressure post session 122/85 mmHg      Treatment: OT treatment provided this date includes:  Bed mobility: Instruction on precautions prior to bed mobility to facilitate safe transfers and ADLS. Pt impulsive. Balance retraining: Performed sitting/standing balance ex's with instruction to facilitate righting reactions with postural changes during ADLS. Pt unsteady; cues to slow down. ADL retraining: Instruction on adapted techniques to increase independence and safe reach during dressing/bathing activities. Pt demonstrated G follow through. Delirium Prevention: Environmental and sensory modifications assessed and implemented to decrease ICU acquired delirium and to improve overall orientation, mentation and pt interaction with family/staff. Line management and environmental modifications made prior to and end of    session to ensure patient safety and to increase efficiency of session. Skilled monitoring of HR, O2 saturation, blood pressure and patient's response to activity performed throughout session. Comments: OK from RN to see patient. Upon arrival, patient supine in bed, lethargic/restless but agreeable to session. Pt demo fair tolerance with fair+/good understanding of education/techniques. At end of session, patient left seated in chair to increase activity tolerance. Chair alarm activated. Nurse aware. Call light within reach, all lines and tubes intact. Pt instructed on use of call light for assistance and fall prevention. .    Patient presents with decreased ROM/strength,activity tolerance, dynamic balance, functional mobility limiting completion of ADLs and safety. Pt can benefit from continued skilled OT to increase safety, functional independence and quality of life. Rehab Potential: Good for established goals    Patient / Family Goal: to return to PLOF    Patient and/or family were instructed/educated on diagnosis, prognosis/goals and plan of care.  Pt demonstrated fair+/good understanding. Evaluation Complexity: Moderate     History: Expanded chart review of consults, imaging, and psychosocial history related to current functional performance. Exam: 5+ performance deficits identified limiting functional independence and safe return home   Assistance/Modification: Min/mod assistance or modifications required to perform tasks. May have comorbidities that affect occupational performance. [] Malnutrition indicators have been identified and nursing has been notified to ensure a dietitian consult is ordered. Time In:1355             Time Out: 1418         Total Treatment time: 8   Min Units   OT Eval Low 68347     OT Eval Medium 83076 X    OT Eval High 01781     OT Re-Eval [id number]     Therapeutic Ex 53790     Therapeutic Activities 82724     ADL/Self Care 00049 8 1   Orthotic Management 21252     Neuro Re-Ed 84065     Non-Billable Time        Evaluation time includes thorough review of current medical information, gathering information on past medical history/social history and prior level of function, completion of standardized testing/informal observation of tasks, assessment of data and development of POC/Goals.      Mary Pruett, OTR/L [id number]

## 2022-10-28 NOTE — PROGRESS NOTES
Speech Language Pathology      NAME:  Leodan Neal  :  1965  DATE: 10/28/2022  ROOM:  [address] [place] [address]    Spoke with RN, HOLD SLP eval at this time; Pt sleeping and when awake is agitated. Would be best to wait until following date.      Acute ischemic right MCA stroke ([facility] [address] [I63.511]  Acute cerebrovascular accident (CVA) ([address] [I63.9]  Cerebrovascular accident (CVA), unspecified mechanism ([address] [I63.9]

## 2022-10-28 NOTE — PROGRESS NOTES
This RN entered pt room approximately at 2035 and found the pt had disconnected the art line tubing and had blood on bed, pt and floor. Art line alarm was not set on monitor so no alarm went off to inform the RN of a problem. Pt neuro status unchanged. Denies nausea, dizziness. Pt bathed, linens changed and art line connection tightened and flushed. RN performed teaching session on importance of keeping lines intact and need for less movement in bed. RN also instructed pt to call out if she sees blood in her tubing or on self. Alarms were checked and turned on appropriately at this time.  [facility], [facility] informed of event. Checking CBC w/ AM labs and more frequent monitoring of this pt.

## 2022-10-28 NOTE — PROGRESS NOTES
Intensive Care Unit  Critical Care Consult  Daily Progress Note 10/28/2022    Date of Admission: 10/27    EVENTS:   65 y.o. female who presents with left sided weakness and inability to walk. CT revealed R MCA occlusion with penumbra. She was taken to IR for thrombectomy. 10/28 patient very agitated, anxious. Started on PNB. Continues on cardene    PHYSICAL EXAM:    BP (!) 110/92   Pulse 85   Temp 99 °F (37.2 °C) (Oral)   Resp 11   SpO2 100%     General appearance:  Comfortable. Pain Description: mild    GCS:    4 - Opens eyes on own   6 - Follows simple motor commands  5 - Alert and oriented    Pupil size: Left 3 mm  Right 3 mm  Pupil reaction: Yes  Wiggles fingers: Left Yes Right Yes  Hand grasp:   Left normal     Right normal  Wiggles toes: Left Yes    Right Yes  Plantar flexion:  Left normal    Right normal    CONSTITUTIONAL: no acute distress, lying in hospital bed  NEUROLOGIC: PERRL, oriented   CARDIOVASCULAR: S1 S2, regular rate, regular rhythm, no murmur/gallop/rub. Monitor: NSR  PULMONARY: no rhonchi/rales/wheezes, no use of accessory muscles  RENAL: clear yellow urine  ABDOMEN: soft, nontender, nondistended, nontympanic, normal bowel sounds   MUSCULOSKELETAL: moves all extremities purposefully  SKIN/EXTREMITIES: no rashes/ecchymosis, no edema/clubbing, warm/dry, good capillary refill     LINES: PIV    CONSULTS:   Medicine  Neurology      Past Medical History:   Diagnosis Date    Asthma     CAD (coronary artery disease)     2011 had MI per patient  / follows with Dr. Josephine Mukherjee    Environmental allergies     Fracture     right 5th metacarpal    Heart palpitations     Hypertension     MI (myocardial infarction) [facility]) 2011    Syncope and collapse        ASSESSMENT/PLAN:       Neuro:  R MCA CVA s/p thrombectomy. Hx alcohol abuse. Monitor neuro status. Neurology following. PNB  CV: HTN. HX of HTN,. Monitor hemodynamics. BP goal less than 160  mmHg. PRN hydralazine & labetalol. Statin. 2D Echo.  Cardene gtt  Pulm: No acute issues. Hx tobacco abuse. Monitor RR & SpO2. Encourage cough and deep breathing. Supplemental oxygen PRN  GI: No acute issues. Diet. Monitor bowel function. Swallow eval when able  Renal:  hyponatremia. Monitor BUN & Cr. Monitor electrolytes & replace as needed. Monitor urine output. ID: No acute issues   Endocrine: No acute issues. Hyperglycemia. Monitor BS.  MSK: No acute issues.  ROM. Turn & reposition. PT/OT  Heme: No acute issues. Monitor CBC. Bowel regimen: Glycolax. Last BM PTA  Pain control/Sedation: Tylenol. PNB  DVT prophylaxis: SCDs. GI prophylaxis: Protonix, diet  Family update: will update when available  Code status: full   Disposition: ICU     The patient is at significant risk for life-threatening respiratory and neurological deterioration and/or death and requires ongoing critical care management.       Total time: 30 minutes    Electronically signed by Sharmila Jackson CNP on 10/28/2022 at 1:04 PM

## 2022-10-29 LAB
ALBUMIN SERPL-MCNC: 4 G/DL (ref 3.5–5.2)
ALP BLD-CCNC: 99 U/L (ref 35–104)
ALT SERPL-CCNC: 12 U/L (ref 0–32)
ANION GAP SERPL CALCULATED.3IONS-SCNC: 11 MMOL/L (ref 7–16)
AST SERPL-CCNC: 20 U/L (ref 0–31)
BASOPHILS ABSOLUTE: 0.01 E9/L (ref 0–0.2)
BASOPHILS RELATIVE PERCENT: 0.1 % (ref 0–2)
BILIRUB SERPL-MCNC: 0.2 MG/DL (ref 0–1.2)
BUN BLDV-MCNC: 12 MG/DL (ref 6–20)
CALCIUM SERPL-MCNC: 9.4 MG/DL (ref 8.6–10.2)
CHLORIDE BLD-SCNC: 94 MMOL/L (ref 98–107)
CO2: 23 MMOL/L (ref 22–29)
CREAT SERPL-MCNC: 0.9 MG/DL (ref 0.5–1)
EOSINOPHILS ABSOLUTE: 0.03 E9/L (ref 0.05–0.5)
EOSINOPHILS RELATIVE PERCENT: 0.3 % (ref 0–6)
GFR SERPL CREATININE-BSD FRML MDRD: >60 ML/MIN/1.73
GLUCOSE BLD-MCNC: 103 MG/DL (ref 74–99)
HCT VFR BLD CALC: 23.7 % (ref 34–48)
HEMOGLOBIN: 7.8 G/DL (ref 11.5–15.5)
IMMATURE GRANULOCYTES #: 0.06 E9/L
IMMATURE GRANULOCYTES %: 0.6 % (ref 0–5)
LYMPHOCYTES ABSOLUTE: 2.56 E9/L (ref 1.5–4)
LYMPHOCYTES RELATIVE PERCENT: 26.3 % (ref 20–42)
MCH RBC QN AUTO: 27.4 PG (ref 26–35)
MCHC RBC AUTO-ENTMCNC: 32.9 % (ref 32–34.5)
MCV RBC AUTO: 83.2 FL (ref 80–99.9)
MONOCYTES ABSOLUTE: 1.04 E9/L (ref 0.1–0.95)
MONOCYTES RELATIVE PERCENT: 10.7 % (ref 2–12)
NEUTROPHILS ABSOLUTE: 6.03 E9/L (ref 1.8–7.3)
NEUTROPHILS RELATIVE PERCENT: 62 % (ref 43–80)
PDW BLD-RTO: 14.5 FL (ref 11.5–15)
PLATELET # BLD: 373 E9/L (ref 130–450)
PMV BLD AUTO: 8 FL (ref 7–12)
POTASSIUM SERPL-SCNC: 3.7 MMOL/L (ref 3.5–5)
RBC # BLD: 2.85 E12/L (ref 3.5–5.5)
SODIUM BLD-SCNC: 128 MMOL/L (ref 132–146)
TOTAL PROTEIN: 6.3 G/DL (ref 6.4–8.3)
WBC # BLD: 9.7 E9/L (ref 4.5–11.5)

## 2022-10-29 PROCEDURE — 92523 SPEECH SOUND LANG COMPREHEN: CPT

## 2022-10-29 PROCEDURE — 85025 COMPLETE CBC W/AUTO DIFF WBC: CPT

## 2022-10-29 PROCEDURE — 6360000002 HC RX W HCPCS: Performed by: SURGERY

## 2022-10-29 PROCEDURE — 2000000000 HC ICU R&B

## 2022-10-29 PROCEDURE — 2500000003 HC RX 250 WO HCPCS: Performed by: CLINICAL NURSE SPECIALIST

## 2022-10-29 PROCEDURE — 6370000000 HC RX 637 (ALT 250 FOR IP): Performed by: PSYCHIATRY & NEUROLOGY

## 2022-10-29 PROCEDURE — 6360000002 HC RX W HCPCS: Performed by: CLINICAL NURSE SPECIALIST

## 2022-10-29 PROCEDURE — 6370000000 HC RX 637 (ALT 250 FOR IP): Performed by: CLINICAL NURSE SPECIALIST

## 2022-10-29 PROCEDURE — 2580000003 HC RX 258: Performed by: ANESTHESIOLOGY

## 2022-10-29 PROCEDURE — 80053 COMPREHEN METABOLIC PANEL: CPT

## 2022-10-29 PROCEDURE — 2580000003 HC RX 258: Performed by: CLINICAL NURSE SPECIALIST

## 2022-10-29 PROCEDURE — 2580000003 HC RX 258: Performed by: PSYCHIATRY & NEUROLOGY

## 2022-10-29 PROCEDURE — 99232 SBSQ HOSP IP/OBS MODERATE 35: CPT | Performed by: PSYCHIATRY & NEUROLOGY

## 2022-10-29 RX ORDER — PHENOBARBITAL SODIUM 65 MG/ML
260 INJECTION INTRAMUSCULAR EVERY 6 HOURS
Status: DISCONTINUED | OUTPATIENT
Start: 2022-10-29 | End: 2022-10-29

## 2022-10-29 RX ORDER — PHENOBARBITAL SODIUM 65 MG/ML
260 INJECTION INTRAMUSCULAR EVERY 6 HOURS
Status: COMPLETED | OUTPATIENT
Start: 2022-10-29 | End: 2022-10-30

## 2022-10-29 RX ORDER — SENNA PLUS 8.6 MG/1
1 TABLET ORAL NIGHTLY
Status: DISCONTINUED | OUTPATIENT
Start: 2022-10-29 | End: 2022-10-30 | Stop reason: HOSPADM

## 2022-10-29 RX ORDER — POLYETHYLENE GLYCOL 3350 17 G/17G
17 POWDER, FOR SOLUTION ORAL DAILY PRN
Status: DISCONTINUED | OUTPATIENT
Start: 2022-10-29 | End: 2022-10-30 | Stop reason: HOSPADM

## 2022-10-29 RX ORDER — CLOPIDOGREL BISULFATE 75 MG/1
75 TABLET ORAL DAILY
Status: DISCONTINUED | OUTPATIENT
Start: 2022-10-29 | End: 2022-10-30 | Stop reason: HOSPADM

## 2022-10-29 RX ORDER — METHOCARBAMOL 750 MG/1
750 TABLET, FILM COATED ORAL 4 TIMES DAILY
Status: DISCONTINUED | OUTPATIENT
Start: 2022-10-29 | End: 2022-10-30 | Stop reason: HOSPADM

## 2022-10-29 RX ORDER — LORAZEPAM 2 MG/ML
1 INJECTION INTRAMUSCULAR
Status: DISCONTINUED | OUTPATIENT
Start: 2022-10-29 | End: 2022-10-30 | Stop reason: HOSPADM

## 2022-10-29 RX ORDER — ATORVASTATIN CALCIUM 40 MG/1
80 TABLET, FILM COATED ORAL NIGHTLY
Status: DISCONTINUED | OUTPATIENT
Start: 2022-10-29 | End: 2022-10-30 | Stop reason: HOSPADM

## 2022-10-29 RX ORDER — ENOXAPARIN SODIUM 100 MG/ML
40 INJECTION SUBCUTANEOUS DAILY
Status: DISCONTINUED | OUTPATIENT
Start: 2022-10-29 | End: 2022-10-30 | Stop reason: HOSPADM

## 2022-10-29 RX ADMIN — CLOPIDOGREL BISULFATE 75 MG: 75 TABLET ORAL at 17:08

## 2022-10-29 RX ADMIN — SODIUM CHLORIDE 0.2 MCG/KG/HR: 9 INJECTION, SOLUTION INTRAVENOUS at 00:22

## 2022-10-29 RX ADMIN — PHENOBARBITAL SODIUM 260 MG: 65 INJECTION INTRAMUSCULAR at 15:43

## 2022-10-29 RX ADMIN — Medication 100 MG: at 08:14

## 2022-10-29 RX ADMIN — Medication 2 G: at 21:09

## 2022-10-29 RX ADMIN — SENNOSIDES 8.6 MG: 8.6 TABLET, FILM COATED ORAL at 21:08

## 2022-10-29 RX ADMIN — METHOCARBAMOL 750 MG: 750 TABLET ORAL at 13:43

## 2022-10-29 RX ADMIN — PANTOPRAZOLE SODIUM 40 MG: 40 TABLET, DELAYED RELEASE ORAL at 07:49

## 2022-10-29 RX ADMIN — METHOCARBAMOL 750 MG: 750 TABLET ORAL at 21:08

## 2022-10-29 RX ADMIN — SODIUM CHLORIDE 0.2 MCG/KG/HR: 9 INJECTION, SOLUTION INTRAVENOUS at 21:04

## 2022-10-29 RX ADMIN — MULTIVITAMIN TABLET 1 TABLET: TABLET at 08:14

## 2022-10-29 RX ADMIN — ATORVASTATIN CALCIUM 80 MG: 40 TABLET, FILM COATED ORAL at 21:08

## 2022-10-29 RX ADMIN — FOLIC ACID 1 MG: 1 TABLET ORAL at 08:14

## 2022-10-29 RX ADMIN — Medication 2 G: at 12:15

## 2022-10-29 RX ADMIN — SODIUM CHLORIDE, PRESERVATIVE FREE 10 ML: 5 INJECTION INTRAVENOUS at 08:14

## 2022-10-29 RX ADMIN — SODIUM CHLORIDE, PRESERVATIVE FREE 10 ML: 5 INJECTION INTRAVENOUS at 21:10

## 2022-10-29 RX ADMIN — PHENOBARBITAL SODIUM 260 MG: 65 INJECTION INTRAMUSCULAR at 20:57

## 2022-10-29 RX ADMIN — HYDROCHLOROTHIAZIDE 25 MG: 25 TABLET ORAL at 08:14

## 2022-10-29 RX ADMIN — ASPIRIN 81 MG 81 MG: 81 TABLET ORAL at 08:14

## 2022-10-29 RX ADMIN — PHENOBARBITAL SODIUM 130 MG: 65 INJECTION INTRAMUSCULAR; INTRAVENOUS at 02:36

## 2022-10-29 RX ADMIN — LORAZEPAM 1 MG: 2 INJECTION INTRAMUSCULAR; INTRAVENOUS at 03:17

## 2022-10-29 RX ADMIN — ENOXAPARIN SODIUM 40 MG: 100 INJECTION SUBCUTANEOUS at 09:41

## 2022-10-29 RX ADMIN — AMLODIPINE BESYLATE 5 MG: 5 TABLET ORAL at 08:14

## 2022-10-29 RX ADMIN — PHENOBARBITAL SODIUM 130 MG: 65 INJECTION INTRAMUSCULAR; INTRAVENOUS at 08:14

## 2022-10-29 RX ADMIN — LOSARTAN POTASSIUM 100 MG: 50 TABLET, FILM COATED ORAL at 08:14

## 2022-10-29 RX ADMIN — SODIUM CHLORIDE, PRESERVATIVE FREE 10 ML: 5 INJECTION INTRAVENOUS at 08:15

## 2022-10-29 ASSESSMENT — PAIN SCALES - GENERAL
PAINLEVEL_OUTOF10: 0
PAINLEVEL_OUTOF10: 6
PAINLEVEL_OUTOF10: 0
PAINLEVEL_OUTOF10: 7
PAINLEVEL_OUTOF10: 0
PAINLEVEL_OUTOF10: 0

## 2022-10-29 NOTE — PROGRESS NOTES
SPEECH/LANGUAGE PATHOLOGY  SPEECH/LANGUAGE/COGNITIVE EVALUATION   and PLAN OF CARE      PATIENT NAME:  Dee Valentino  (female)     MRN:  [id number]    :  1965  (65 y.o.)  STATUS:  Inpatient: Room 4524/4524-A    TODAY'S DATE:  10/29/2022  REFERRING PROVIDER:   Dr. Romana Vivas: Speech language pathology evaluation  Date of order:  10/27/22  REASON FOR REFERRAL:  CVA  EVALUATING THERAPIST: Romayne Massed, SLP    ADMITTING DIAGNOSIS: Acute ischemic right MCA stroke ([address] [I63.511]  Acute cerebrovascular accident (CVA) ([address] [I63.9]  Cerebrovascular accident (CVA), unspecified mechanism ([address] [I63.9]    VISIT DIAGNOSIS:   Visit Diagnoses         Codes    Cerebrovascular accident (CVA), unspecified mechanism ([address]    -  Primary I63.9               SPEECH THERAPY  PLAN OF CARE   The speech therapy  POC is established based on physician order, speech pathology diagnosis and results of clinical assessment     SPEECH PATHOLOGY DIAGNOSIS:    severe cognitive linguistic deficit    Speech Pathology intervention is recommended 3-6 times per week for LOS or when goals are met with emphasis on the following:      Conditions Requiring Skilled Therapeutic Intervention for speech, language and/or cognition    Cognitive linguistic impairment  Decreased safety awareness  Decreased short term memory  Decreased problem solving skills   Decreased thought organization    Specific Speech Therapy Interventions to Include: Therapeutic tasks for Cognition    Specific instructions for next treatment: To initiate POC    SHORT/LONG TERM GOALS  Pt will improve orientation to spatial and temporal surroundings with use of external memory aides.   Pt will improve immediate, short term, recent memory during structured and unstructured tasks with 50% accuracy   Pt will improve problem solving/thought organization during structured and unstructured tasks with 50% accuracy     Patient goals: Patient/family involved in developing goals and treatment plan:   Treatment goals discussed with Patient and Family    The Family understand(s) the diagnosis, prognosis and plan of care   The patient/family Agreed with above,     This plan may be re-evaluated and revised as warranted. Rehabilitation Potential/Prognosis: good                CLINICAL ASSESSMENT:  MOTOR SPEECH       Oral Peripheral Examination   Generalized oral weakness and Left labiobuccal weakness    Parameters of Speech Production  Respiration:  Adequate for speech production  Articulation:  Within functional limits  Resonance:  Within functional limits  Quality:   Within functional limits  Pitch:     Within functional limits  Intensity: Loud  Fluency:  Intact  Prosody Intact    RECEPTIVE LANGUAGE    Comprehension of Yes/No Questions:   Inconsistent    Process  Simple Verbal Commands:   Within functional limits  Process Intermediate Verbal Commands:   Latent and Inconsistent  Process Complex Verbal Commands:    Incomplete, Latent, Inconsistent, and Impaired    Comprehension of Conversation:     Incomplete, Latent, and Inconsistent      EXPRESSIVE LANGUAGE     Serials: Functional    Imitation:  Words   Functional   Sentences Functional    Naming:  (Modality used:  Verbal)  Confrontation Naming  Impaired  Functional Description  Functional  Response Naming: Functional    Conversation:      Confusion was noted during conversation    COGNITION     Attention/Orientation  Attention: Easily Distracted, extremely restless  Orientation:  disoriented to time, place, and reason for hospitalization    Memory   Immediate Recall: Repeated 3/3    Delayed Recall:   Recalled 1/3     Long Term Recall:   Recalled Birthdate    Organization/Problem Solving/Reasoning   Verbal Sequencing:   Impaired        Verbal Problem solving:   Impaired          CLINICAL OBSERVATIONS NOTED DURING THE EVALUATION  Inconsistent responses, Cueing was required, Reduced eye contact  Patient did not recall her husbands name. When corrected, she became agitated. EDUCATION:   The Speech Language Pathologist (SLP) completed education regarding results of evaluation and that intervention is warranted at this time. Learner: Patient  Education: Reviewed results and recommendations of this evaluation  Evaluation of Education:  Needs further instruction    Evaluation Time includes thorough review of current medical information, gathering information on past medical history/social history and prior level of function, completion of standardized testing/informal observation of tasks, assessment of data and education on plan of care and goals. CPT code:    30891  eval speech sound lang comprehension      The admitting diagnosis and active problem list, as listed below have been reviewed prior to initiation of this evaluation. ACTIVE PROBLEM LIST:   Patient Active Problem List   Diagnosis    Abscess    Acute cerebrovascular accident (CVA) ([facility] [address]    Acute ischemic right MCA stroke ([facility] [address]       Radha Melchor M.S., CCC-SLP/L  Speech-Language Pathologist

## 2022-10-29 NOTE — PLAN OF CARE
Problem: Skin/Tissue Integrity  Goal: Absence of new skin breakdown  Description: 1. Monitor for areas of redness and/or skin breakdown  2. Assess vascular access sites hourly  3. Every 4-6 hours minimum:  Change oxygen saturation probe site  4. Every 4-6 hours:  If on nasal continuous positive airway pressure, respiratory therapy assess nares and determine need for appliance change or resting period.   10/28/2022 2129 by Michael Barrow RN  Outcome: Progressing  10/28/2022 1042 by Rebel Morris RN  Outcome: Progressing     Problem: Safety - Adult  Goal: Free from fall injury  10/28/2022 2129 by Michael Barrow RN  Outcome: Progressing  10/28/2022 1042 by eRbel Morris RN  Outcome: Progressing

## 2022-10-29 NOTE — PROGRESS NOTES
STROKE NEUROLOGY FOLLOW UP    Patient stroke work-up was completed echo is negative. Recommend dual antiplatelet therapy and follow-up with me in 4 weeks. This was discussed with the patient. She is adamant about going home today. If it is okay with her family and her primary care admitting hospitalist she is clear from my standpoint to go home. With home therapy    Impression:  #1  Acute ischemic stroke in Right MCA territory M1 thrombus s/p thrombectomy  #2 Alcohol withdrawal Syndrome  #3 HTN  #4 HLD  #5 Intracranial atherosclerosis  #6 Carotid plaque 50% stenosis etiology of #1        Plan:    Interventions    Endovascular - Yes TICI 3    Imaging  CT head- small stroke    Medications  Antiplatelet: ASA + Plavix for 90 days  Anticoagulation: not indicated  Statin: Lipitor 80 mg QHS  Blood Pressure Goals:NORMOTENSIVE  MAP >60   DVT prophylaxis: Lovenox  ECHO negative    Stroke Discharge Plan  Antithrombotic/Antiplatelet:  ASA Plavix for 90 days  Statin: [id number]  Event Monitor at discharge: NO  F/u appointments: [facility] in 4 weeks to rescan and f/u carotid         Interval History:    Patient was seen and examined this Afternoon  Doing well and asking to go home          REVIEW OF SYSTEMS:    Constitutional: Denies fever, weight loss, fatigue and night sweats. Neurological: Mild facial droop on the left side. Psychiatric: Denies anxiety, depression and hallucinations. Eyes: Denies blurred vision, double vision and eye pain. ENMT: Denies difficulty swallowing, dental pain, hearing loss, and tinnitus. Cardiovascular: Denies chest pain and palpitations. Respiratory: Denies shortness of breath. Genitourinary: Denies urinary incontinence and retention. Integumentary: Denies rashes. Endocrine: Denies polydipsia and polyuria.     Social History  Social History       Tobacco History       Smoking Status  Former Quit Date  7/20/2011 Smoking Frequency  2.00 packs/day for 6.00 years (12.00 pk-yrs) Smoking Tobacco Type  Cigarettes quit in 7/20/2011      Smokeless Tobacco Use  Never              Alcohol History       Alcohol Use Status  Yes Drinks/Week  42 Cans of beer per week Amount  42.0 standard drinks of alcohol/wk Comment  daily              Drug Use       Drug Use Status  No              Sexual Activity       Sexually Active  Yes                    Tobacco Use  Tobacco Use: Medium Risk    Smoking Tobacco Use: Former    Smokeless Tobacco Use: Never    Passive Exposure: Not on file       Substance Use Topics  Denies to me at my eval    NEUROLOGICAL EXAM    Constitutional: Well developed, well nourished and in no acute distress. Respiratory: Clear to auscultation bilaterally with no use of accessory muscles during respiration. Cardiovascular:  No murmurs auscultated. Carotid arteries without bruits. Pedal pulses and radial pulses 2+ bilaterally. No edema in all four extremities. Mental Status:    Alert and oriented to person, place, and time. Recent and remote memory: Intact  Attention and concentration: Intact  Speech and language : Intact  Fund of knowledge: Intact  Judgement and Insight: Intact    Cranial Nerves:     II: Visual Acuity:        Visual Fields: Full to confrontation bilaterally        Direct Ophthalmoscopy: Optic discs sharp bilaterally with red reflex intact. III, IV, VI: Pupils: equally round and reactive to light, 3  to 2  mm bilaterally. EOMs: full with no nystagmus. V: Sensation intact to light touch and pin prick sensation bilaterally  VII: Left facial droop. VIII: Hearing intact to finger rub bilaterally   IX,X: Palate elevates symmetrically. XI: head turn (sternocleidomastoid) and shoulder shrug (trapezius) 5/5 bilaterally   XII: Tongue is midline upon protrusion    Motor:   Normal tone and bulk. Normal fine finger movements. No pronator drift. No resting tremors, postural tremors or myoclonus.     Upper Extremity Right Left  Lower Extremity Right Left  Deltoid 5 5  Hip Flexion 5 5  Biceps 5 5  Hip Extension 5 5  Triceps      5  5  Hip Adduction 5 5  Wrist Extension 5 5  Knee Extension 5 5  Wrist Flexion 5 5  Knee Flexion 5 5  Index Finger Flexion 5 5  Ankle Dorsiflexion 5 5  Finger Extension 5 5  Ankle Plantarflexion 5 5  ADM/FDI 5 5  Extensor Hallucis Longus 5 5  APB 5 5            Sensory:  Intact light touch and pinprick in upper and lower extremities bilaterally. Intact proprioception and vibration sense in upper and lower extremities bilaterally. Coordination:   Alternating hand and foot movements intact in the UE and LE bilaterally  Finger-to-nose and Heel-to-shin with no ataxia or intention tremor bilaterally    Gait/Station:   Patient rises from a seated position withassistance. Romberg's test negative. Gait pattern is without hesitation, a wide-base, and of normal stride length. NIH stroke scale of 1    Imaging  CT HEAD WO CONTRAST    Result Date: 10/28/2022  EXAMINATION: CT OF THE HEAD WITHOUT CONTRAST  10/28/2022 12:52 pm TECHNIQUE: CT of the head was performed without the administration of intravenous contrast. Automated exposure control, iterative reconstruction, and/or weight based adjustment of the mA/kV was utilized to reduce the radiation dose to as low as reasonably achievable. COMPARISON: 10/27/2022 HISTORY: ORDERING SYSTEM PROVIDED HISTORY: Right MCA stroke follow up TECHNOLOGIST PROVIDED HISTORY: To be performed 24 hours after first CT Head. Reason for exam:->Right MCA stroke follow up Has a \"code stroke\" or \"stroke alert\" been called? ->No What reading provider will be dictating this exam?->CRC FINDINGS: BRAIN/VENTRICLES: 0.9 cm ovoid low-attenuation area seen in the head of the right caudate nucleus seen on axial series 2, image 43 consistent with an acute lacunar infarct, this was not visualized on the prior study.  Bilateral basal ganglia chronic lacunar infarcts visualized demonstrating no significant change in comparison to the prior study. Scattered areas of low attenuation are visualized in the periventricular and subcortical white matter demonstrating no change in comparison to the prior study consistent with chronic microvascular disease. Prominence of the ventricles and sulci is visualized demonstrating no change consistent with chronic atrophic brain changes. No evidence of parenchymal hemorrhages or contusions. No evidence of intra or extra-axial fluid collection is seen. No evidence of intracranial mass or mass effect, no evidence of midline shift is seen. No evidence of sellar or parasellar mass is visualized. ORBITS: The visualized portion of the orbits demonstrate no acute abnormality. SINUSES: The visualized paranasal sinuses and mastoid air cells demonstrate no acute abnormality. SOFT TISSUES/SKULL:  No acute abnormality of the visualized skull or soft tissues. 0.9 cm acute lacunar infarct visualized in the head of the right caudate nucleus. No evidence of acute territorial infarct is seen.

## 2022-10-29 NOTE — PROGRESS NOTES
Intensive Care Unit  Critical Care Consult  Daily Progress Note 10/29/2022    Date of Admission: 10/27    EVENTS:   65 y.o. female who presents with left sided weakness and inability to walk. CT revealed R MCA occlusion with penumbra. She was taken to IR for thrombectomy. 10/28 patient very agitated, anxious. Started on PNB. Continues on cardene  10/29 required ativan PRN overnight. Was started on precedex through the night but discontinued d/t patient pulling at lines. She remains agitated and anxious, tossing and turning all over bed. PHYSICAL EXAM:    /72   Pulse 90   Temp 97.8 °F (36.6 °C) (Oral)   Resp 15   Ht 5' 8\" (1.727 m)   Wt 203 lb 0.7 oz (92.1 kg)   SpO2 99%   BMI 30.87 kg/m²     General appearance:  Comfortable. Pain Description: mild    GCS:    4 - Opens eyes on own   6 - Follows simple motor commands  5 - Alert and oriented    Pupil size: Left 3 mm  Right 3 mm  Pupil reaction: Yes  Wiggles fingers: Left Yes Right Yes  Hand grasp:   Left normal     Right normal  Wiggles toes: Left Yes    Right Yes  Plantar flexion:  Left normal    Right normal    CONSTITUTIONAL: no acute distress, lying in hospital bed  NEUROLOGIC: PERRL, oriented   CARDIOVASCULAR: S1 S2, regular rate, regular rhythm, no murmur/gallop/rub.  Monitor: NSR  PULMONARY: no rhonchi/rales/wheezes, no use of accessory muscles  RENAL: clear yellow urine  ABDOMEN: soft, nontender, nondistended, nontympanic, normal bowel sounds   MUSCULOSKELETAL: moves all extremities purposefully  SKIN/EXTREMITIES: no rashes/ecchymosis, no edema/clubbing, warm/dry, good capillary refill     LINES: PIV    CONSULTS:   Medicine  Neurology   for SBI    Past Medical History:   Diagnosis Date    Asthma     CAD (coronary artery disease)     2011 had MI per patient  / follows with Dr. Elda Gaytan    Environmental allergies     Fracture     right 5th metacarpal    Heart palpitations     Hypertension     MI (myocardial infarction) [facility]) 2011    Syncope and collapse        ASSESSMENT/PLAN:       Neuro:  R MCA CVA s/p thrombectomy. Hx alcohol abuse. Monitor neuro status. Neurology following. PNB increased  CV: HTN. HX of HTN,. Monitor hemodynamics. BP goal less than 160  mmHg. PRN hydralazine & labetalol. Statin. Pulm: No acute issues. Hx tobacco abuse. Monitor RR & SpO2. Encourage cough and deep breathing. Supplemental oxygen PRN  GI: No acute issues. Diet. Monitor bowel function. Swallow eval when able  Renal:  chronic hyponatremia. Monitor BUN & Cr. Monitor electrolytes & replace as needed. Monitor urine output. ID: No acute issues   Endocrine: No acute issues. Hyperglycemia. Monitor BS.  MSK: No acute issues.  ROM. Turn & reposition. PT/OT  Heme: No acute issues. Monitor CBC. Bowel regimen: Glycolax. Last BM PTA  Pain control/Sedation: Tylenol. PNB  DVT prophylaxis: SCDs. GI prophylaxis: Protonix, diet  Family update: will update when available  Code status: full   Disposition: ICU     The patient is at significant risk for life-threatening respiratory and neurological deterioration and/or death and requires ongoing critical care management.       Total time: 30 minutes    Electronically signed by Lissette Ramirez CNP on 10/29/2022 at 11:05 AM

## 2022-10-30 VITALS
BODY MASS INDEX: 30.77 KG/M2 | TEMPERATURE: 98 F | HEIGHT: 68 IN | DIASTOLIC BLOOD PRESSURE: 91 MMHG | SYSTOLIC BLOOD PRESSURE: 141 MMHG | HEART RATE: 99 BPM | WEIGHT: 203.04 LBS | OXYGEN SATURATION: 97 % | RESPIRATION RATE: 20 BRPM

## 2022-10-30 PROCEDURE — 2580000003 HC RX 258: Performed by: PSYCHIATRY & NEUROLOGY

## 2022-10-30 PROCEDURE — 6370000000 HC RX 637 (ALT 250 FOR IP): Performed by: PSYCHIATRY & NEUROLOGY

## 2022-10-30 PROCEDURE — 99232 SBSQ HOSP IP/OBS MODERATE 35: CPT | Performed by: CLINICAL NURSE SPECIALIST

## 2022-10-30 PROCEDURE — 6370000000 HC RX 637 (ALT 250 FOR IP): Performed by: CLINICAL NURSE SPECIALIST

## 2022-10-30 PROCEDURE — 6360000002 HC RX W HCPCS: Performed by: CLINICAL NURSE SPECIALIST

## 2022-10-30 RX ORDER — PHENOBARBITAL SODIUM 65 MG/ML
130 INJECTION INTRAMUSCULAR EVERY 6 HOURS
Status: DISCONTINUED | OUTPATIENT
Start: 2022-10-30 | End: 2022-10-30 | Stop reason: HOSPADM

## 2022-10-30 RX ORDER — AMLODIPINE BESYLATE 5 MG/1
5 TABLET ORAL DAILY
Qty: 30 TABLET | Refills: 3 | Status: SHIPPED | OUTPATIENT
Start: 2022-10-31

## 2022-10-30 RX ORDER — SENNA PLUS 8.6 MG/1
1 TABLET ORAL NIGHTLY
Qty: 30 TABLET | Refills: 0 | Status: SHIPPED | OUTPATIENT
Start: 2022-10-30 | End: 2022-11-29

## 2022-10-30 RX ORDER — ATORVASTATIN CALCIUM 80 MG/1
80 TABLET, FILM COATED ORAL NIGHTLY
Qty: 30 TABLET | Refills: 3 | Status: SHIPPED | OUTPATIENT
Start: 2022-10-30

## 2022-10-30 RX ORDER — CLOPIDOGREL BISULFATE 75 MG/1
75 TABLET ORAL DAILY
Qty: 30 TABLET | Refills: 3 | Status: SHIPPED | OUTPATIENT
Start: 2022-10-31

## 2022-10-30 RX ORDER — ASPIRIN 81 MG/1
81 TABLET, CHEWABLE ORAL DAILY
Qty: 30 TABLET | Refills: 3 | Status: SHIPPED | OUTPATIENT
Start: 2022-10-31

## 2022-10-30 RX ORDER — FOLIC ACID 1 MG/1
1 TABLET ORAL DAILY
Qty: 30 TABLET | Refills: 3 | Status: SHIPPED | OUTPATIENT
Start: 2022-10-31

## 2022-10-30 RX ORDER — LANOLIN ALCOHOL/MO/W.PET/CERES
100 CREAM (GRAM) TOPICAL DAILY
Qty: 30 TABLET | Refills: 3 | Status: SHIPPED | OUTPATIENT
Start: 2022-10-31

## 2022-10-30 RX ORDER — MULTIVITAMIN WITH IRON
1 TABLET ORAL DAILY
Qty: 30 TABLET | Refills: 1 | Status: SHIPPED | OUTPATIENT
Start: 2022-10-31

## 2022-10-30 RX ADMIN — FOLIC ACID 1 MG: 1 TABLET ORAL at 08:30

## 2022-10-30 RX ADMIN — HYDROCHLOROTHIAZIDE 25 MG: 25 TABLET ORAL at 08:30

## 2022-10-30 RX ADMIN — CLOPIDOGREL BISULFATE 75 MG: 75 TABLET ORAL at 08:30

## 2022-10-30 RX ADMIN — METHOCARBAMOL 750 MG: 750 TABLET ORAL at 08:30

## 2022-10-30 RX ADMIN — PHENOBARBITAL SODIUM 260 MG: 65 INJECTION INTRAMUSCULAR at 01:50

## 2022-10-30 RX ADMIN — Medication 100 MG: at 08:31

## 2022-10-30 RX ADMIN — ENOXAPARIN SODIUM 40 MG: 100 INJECTION SUBCUTANEOUS at 08:32

## 2022-10-30 RX ADMIN — AMLODIPINE BESYLATE 5 MG: 5 TABLET ORAL at 08:30

## 2022-10-30 RX ADMIN — Medication 2 G: at 08:39

## 2022-10-30 RX ADMIN — ASPIRIN 81 MG 81 MG: 81 TABLET ORAL at 08:31

## 2022-10-30 RX ADMIN — LOSARTAN POTASSIUM 100 MG: 50 TABLET, FILM COATED ORAL at 08:31

## 2022-10-30 RX ADMIN — PANTOPRAZOLE SODIUM 40 MG: 40 TABLET, DELAYED RELEASE ORAL at 08:34

## 2022-10-30 RX ADMIN — METHOCARBAMOL 750 MG: 750 TABLET ORAL at 11:04

## 2022-10-30 RX ADMIN — PHENOBARBITAL SODIUM 130 MG: 65 INJECTION INTRAMUSCULAR; INTRAVENOUS at 10:24

## 2022-10-30 RX ADMIN — MULTIVITAMIN TABLET 1 TABLET: TABLET at 08:31

## 2022-10-30 RX ADMIN — SODIUM CHLORIDE, PRESERVATIVE FREE 10 ML: 5 INJECTION INTRAVENOUS at 08:33

## 2022-10-30 NOTE — PROGRESS NOTES
Pt continues to be agitated towards nursing staff as well as gets OOB continuously without calling for help. RN continues to reinforce fall risk education but pt refuses education.  Call light within reach, side rails 4/4 in critical care, bed in lowest position, bed alarm on, gripper socks on as well as increased rounding by RN

## 2022-10-30 NOTE — DISCHARGE INSTR - DIET
Good nutrition is important when healing from an illness, injury, or surgery. Follow any nutrition recommendations given to you during your hospital stay. If you were given an oral nutrition supplement while in the hospital, continue to take this supplement at home. You can take it with meals, in-between meals, and/or before bedtime. These supplements can be purchased at most local grocery stores, pharmacies, and chain [employer]-stores. If you have any questions about your diet or nutrition, call the hospital and ask for the dietitian. Pt called to schedule appt for \"pains in her chest\"  Pt states she is not experiencing the pain currently.    Pt has appt scheduled for 12/16 with PCP.    Please advise if triage is needed.    Pt can be reached at CELL: [phone]

## 2022-10-30 NOTE — CARE COORDINATION
This CM notified Saadia with [facility] via ; will meet with pt and offer assistance in the community.

## 2022-10-30 NOTE — DISCHARGE SUMMARY
Discharge Summary    Date: 10/30/2022  Patient Name: Otoniel Botello    YOB: 1965     Age: 65 y.o. Admit Date: 10/27/2022  Discharge Date: 10/30/2022  Discharge Condition: Stable    Admission Diagnosis  Acute ischemic right MCA stroke ([facility] [address] [I63.511]; Acute cerebrovascular accident (CVA) ([facility] [address] [I63.9]; Cerebrovascular accident (CVA), unspecified mechanism ([address] [I63.9]      Discharge Diagnosis  Principal Problem:    Acute cerebrovascular accident (CVA) ([facility] [address]  Active Problems:    Acute ischemic right MCA stroke ([facility] [address]  Resolved Problems:    * No resolved hospital problems. [facility] AND [facility] Stay  Narrative of Hospital Course:  Patient admitted with acute CVA  Had thrombectomy per Neurology  [facility]  Home in stable condition. Consultants:  IP CONSULT TO INTERNAL MEDICINE  IP CONSULT TO SOCIAL WORK  IP CONSULT TO IV TEAM    Surgeries/procedures Performed:      Treatments:            Discharge Plan/Disposition:  Home    Hospital/Incidental Findings Requiring Follow Up:    Patient Instructions:    Diet: Cardiac Diet    Activity:Activity as Tolerated  For number of days (if applicable): Other Instructions:    Provider Follow-Up:   No follow-ups on file.      Significant Diagnostic Studies:    Recent Labs:  Admission on 10/27/2022  Ventricular Rate                              Date: 10/27/2022  Value: 87          Ref range: BPM                Status: Final  Atrial Rate                                   Date: 10/27/2022  Value: 87          Ref range: BPM                Status: Final  P-R Interval                                  Date: 10/27/2022  Value: 208         Ref range: ms                 Status: Final  QRS Duration                                  Date: 10/27/2022  Value: 86          Ref range: ms                 Status: Final  Q-T Interval                                  Date: 10/27/2022  Value: 372         Ref range: ms                 Status: Final  QTc Calculation (Bazett) Date: 10/27/2022  Value: 447         Ref range: ms                 Status: Final  P Axis                                        Date: 10/27/2022  Value: 57          Ref range: degrees            Status: Final  R Axis                                        Date: 10/27/2022  Value: 3           Ref range: degrees            Status: Final  T Axis                                        Date: 10/27/2022  Value: 26          Ref range: degrees            Status: Final  WBC                                           Date: 10/27/2022  Value: 9.7         Ref range: 4.5 - 11.5 E9/L    Status: Final  RBC                                           Date: 10/27/2022  Value: 3.71        Ref range: 3.50 - 5.50 E12/L  Status: Final  Hemoglobin                                    Date: 10/27/2022  Value: 10.1 (A)    Ref range: 11.5 - 15.5 g/dL   Status: Final  Hematocrit                                    Date: 10/27/2022  Value: 30.6 (A)    Ref range: 34.0 - 48.0 %      Status: Final  MCV                                           Date: 10/27/2022  Value: 82.5        Ref range: 80.0 - 99.9 fL     Status: Final  MCH                                           Date: 10/27/2022  Value: 27.2        Ref range: 26.0 - 35.0 pg     Status: Final  MCHC                                          Date: 10/27/2022  Value: 33.0        Ref range: 32.0 - 34.5 %      Status: Final  RDW                                           Date: 10/27/2022  Value: 14.1        Ref range: 11.5 - 15.0 fL     Status: Final  Platelets                                     Date: 10/27/2022  Value: 401         Ref range: 130 - 450 E9/L     Status: Final  MPV                                           Date: 10/27/2022  Value: 8.1         Ref range: 7.0 - 12.0 fL      Status: Final  Neutrophils %                                 Date: 10/27/2022  Value: 72.1        Ref range: 43.0 - 80.0 %      Status: Final  Immature Granulocytes %                       Date: 10/27/2022  Value: 0.8 Ref range: 0.0 - 5.0 %        Status: Final  Lymphocytes %                                 Date: 10/27/2022  Value: 15.3 (A)    Ref range: 20.0 - 42.0 %      Status: Final  Monocytes %                                   Date: 10/27/2022  Value: 10.4        Ref range: 2.0 - 12.0 %       Status: Final  Eosinophils %                                 Date: 10/27/2022  Value: 1.2         Ref range: 0.0 - 6.0 %        Status: Final  Basophils %                                   Date: 10/27/2022  Value: 0.2         Ref range: 0.0 - 2.0 %        Status: Final  Neutrophils Absolute                          Date: 10/27/2022  Value: 7.02        Ref range: 1.80 - 7.30 E9/L   Status: Final  Immature Granulocytes #                       Date: 10/27/2022  Value: 0.08        Ref range: E9/L               Status: Final  Lymphocytes Absolute                          Date: 10/27/2022  Value: 1.49 (A)    Ref range: 1.50 - 4.00 E9/L   Status: Final  Monocytes Absolute                            Date: 10/27/2022  Value: 1.01 (A)    Ref range: 0.10 - 0.95 E9/L   Status: Final  Eosinophils Absolute                          Date: 10/27/2022  Value: 0.12        Ref range: 0.05 - 0.50 E9/L   Status: Final  Basophils Absolute                            Date: 10/27/2022  Value: 0.02        Ref range: 0.00 - 0.20 E9/L   Status: Final  Troponin, High Sensitivity                    Date: 10/27/2022  Value: 7           Ref range: 0 - 9 ng/L         Status: Final                Comment: High Sensitivity Troponin values cannot be compared with  other Troponin methodologies. Patients with high levels of Biotin oral intake (i.e. >5 mg/day)  may have falsely decreased Troponin levels. Samples collected  within 8 hours of biotin intake may require additional information  for diagnosis.     aPTT                                          Date: 10/27/2022  Value: 27.8        Ref range: 24.5 - 35.1 sec    Status: Final  Protime Date: 10/27/2022  Value: 10.2        Ref range: 9.3 - 12.4 sec     Status: Final  INR                                           Date: 10/27/2022  Value: 0.9           Status: Final  Sodium                                        Date: 10/27/2022  Value: 129 (A)     Ref range: 132 - 146 mmol/L   Status: Final  Potassium reflex Magnesium                    Date: 10/27/2022  Value: 3.9         Ref range: 3.5 - 5.0 mmol/L   Status: Final  Chloride                                      Date: 10/27/2022  Value: 91 (A)      Ref range: 98 - 107 mmol/L    Status: Final  CO2                                           Date: 10/27/2022  Value: 27          Ref range: 22 - 29 mmol/L     Status: Final  Anion Gap                                     Date: 10/27/2022  Value: 11          Ref range: 7 - 16 mmol/L      Status: Final  Glucose                                       Date: 10/27/2022  Value: 110 (A)     Ref range: 74 - 99 mg/dL      Status: Final  BUN                                           Date: 10/27/2022  Value: 22 (A)      Ref range: 6 - 20 mg/dL       Status: Final  Creatinine                                    Date: 10/27/2022  Value: 0.9         Ref range: 0.5 - 1.0 mg/dL    Status: Final  Est, Glom Filt Rate                           Date: 10/27/2022  Value: >60         Ref range: >=60 mL/min/1.73   Status: Final                Comment: Pediatric calculator link  [email or web address]. [email or web address]/gfr_calculatorped  Effective Oct 3, 2022  These results are not intended for use in patients  <25years of age. eGFR results are calculated without  a race factor using the 2021 CKD-EPI equation. Careful  clinical correlation is recommended, particularly when  comparing to results calculated using previous equations.   The CKD-EPI equation is less accurate in patients with  extremes of muscle mass, extra-renal metabolism of  creatinine, excessive creatinine ingestion, or following  therapy that affects renal tubular secretion. Calcium                                       Date: 10/27/2022  Value: 9.6         Ref range: 8.6 - 10.2 mg/dL   Status: Final  Total Protein                                 Date: 10/27/2022  Value: 7.0         Ref range: 6.4 - 8.3 g/dL     Status: Final  Albumin                                       Date: 10/27/2022  Value: 4.4         Ref range: 3.5 - 5.2 g/dL     Status: Final  Total Bilirubin                               Date: 10/27/2022  Value: <0.2        Ref range: 0.0 - 1.2 mg/dL    Status: Final  Alkaline Phosphatase                          Date: 10/27/2022  Value: 138 (A)     Ref range: 35 - 104 U/L       Status: Final  ALT                                           Date: 10/27/2022  Value: 12          Ref range: 0 - 32 U/L         Status: Final  AST                                           Date: 10/27/2022  Value: 16          Ref range: 0 - 31 U/L         Status: Final  Meter Glucose                                 Date: 10/27/2022  Value: 119 (A)     Ref range: 74 - 99 mg/dL      Status: Final  SARS-CoV-2, NAAT                              Date: 10/27/2022  Value: Not Detected                     Ref range: Not Detected       Status: Final                Comment: Rapid NAAT:   Negative results should be treated as presumptive and,  if inconsistent with clinical signs and symptoms or necessary for  patient management, should be tested with an alternative molecular  assay. Negative results do not preclude SARS-CoV-2 infection and  should not be used as the sole basis for patient management decisions. This test has been authorized by the FDA under an Emergency Use  Authorization (EUA) for use by authorized laboratories.     Fact sheet for Healthcare Providers:  http://www.adam-aaron.curtis/  Fact sheet for Patients: http://www.medina-walker.biz/    METHODOLOGY: Isothermal Nucleic Acid Amplification    Sodium                                        Date: range: 6.4 - 8.3 g/dL     Status: Final  Albumin                                       Date: 10/28/2022  Value: 4.0         Ref range: 3.5 - 5.2 g/dL     Status: Final  Total Bilirubin                               Date: 10/28/2022  Value: <0.2        Ref range: 0.0 - 1.2 mg/dL    Status: Final  Alkaline Phosphatase                          Date: 10/28/2022  Value: 105 (A)     Ref range: 35 - 104 U/L       Status: Final  ALT                                           Date: 10/28/2022  Value: 12          Ref range: 0 - 32 U/L         Status: Final  AST                                           Date: 10/28/2022  Value: 17          Ref range: 0 - 31 U/L         Status: Final  Bilirubin, Direct                             Date: 10/28/2022  Value: <0.2        Ref range: 0.0 - 0.3 mg/dL    Status: Final  Bilirubin, Indirect                           Date: 10/28/2022  Value: see below   Ref range: 0.0 - 1.0 mg/dL    Status: Final                Comment: Indirect Bilirubin cannot be calculated since Total Bilirubin  and/or Direct Bilirubin is below measurable range.     WBC                                           Date: 10/28/2022  Value: 10.1        Ref range: 4.5 - 11.5 E9/L    Status: Final  RBC                                           Date: 10/28/2022  Value: 3.06 (A)    Ref range: 3.50 - 5.50 E12/L  Status: Final  Hemoglobin                                    Date: 10/28/2022  Value: 8.4 (A)     Ref range: 11.5 - 15.5 g/dL   Status: Final  Hematocrit                                    Date: 10/28/2022  Value: 25.0 (A)    Ref range: 34.0 - 48.0 %      Status: Final  MCV                                           Date: 10/28/2022  Value: 81.7        Ref range: 80.0 - 99.9 fL     Status: Final  MCH                                           Date: 10/28/2022  Value: 27.5        Ref range: 26.0 - 35.0 pg     Status: Final  MCHC                                          Date: 10/28/2022  Value: 33.6        Ref range: 32.0 - 34.5 %      Status: Final  RDW                                           Date: 10/28/2022  Value: 14.3        Ref range: 11.5 - 15.0 fL     Status: Final  Platelets                                     Date: 10/28/2022  Value: 380         Ref range: 130 - 450 E9/L     Status: Final  MPV                                           Date: 10/28/2022  Value: 8.4         Ref range: 7.0 - 12.0 fL      Status: Final  Neutrophils %                                 Date: 10/28/2022  Value: 84.1 (A)    Ref range: 43.0 - 80.0 %      Status: Final  Immature Granulocytes %                       Date: 10/28/2022  Value: 1.4         Ref range: 0.0 - 5.0 %        Status: Final  Lymphocytes %                                 Date: 10/28/2022  Value: 7.4 (A)     Ref range: 20.0 - 42.0 %      Status: Final  Monocytes %                                   Date: 10/28/2022  Value: 7.0         Ref range: 2.0 - 12.0 %       Status: Final  Eosinophils %                                 Date: 10/28/2022  Value: 0.0         Ref range: 0.0 - 6.0 %        Status: Final  Basophils %                                   Date: 10/28/2022  Value: 0.1         Ref range: 0.0 - 2.0 %        Status: Final  Neutrophils Absolute                          Date: 10/28/2022  Value: 8.49 (A)    Ref range: 1.80 - 7.30 E9/L   Status: Final  Immature Granulocytes #                       Date: 10/28/2022  Value: 0.14        Ref range: E9/L               Status: Final  Lymphocytes Absolute                          Date: 10/28/2022  Value: 0.75 (A)    Ref range: 1.50 - 4.00 E9/L   Status: Final  Monocytes Absolute                            Date: 10/28/2022  Value: 0.71        Ref range: 0.10 - 0.95 E9/L   Status: Final  Eosinophils Absolute                          Date: 10/28/2022  Value: 0.00 (A)    Ref range: 0.05 - 0.50 E9/L   Status: Final  Basophils Absolute                            Date: 10/28/2022  Value: 0.01        Ref range: 0.00 - 0.20 E9/L   Status: Final  Ethanol Lvl                                   Date: 10/27/2022  Value: <10         Ref range: mg/dL              Status: Final                Comment: Not Detected  WBC                                           Date: 10/29/2022  Value: 9.7         Ref range: 4.5 - 11.5 E9/L    Status: Final  RBC                                           Date: 10/29/2022  Value: 2.85 (A)    Ref range: 3.50 - 5.50 E12/L  Status: Final  Hemoglobin                                    Date: 10/29/2022  Value: 7.8 (A)     Ref range: 11.5 - 15.5 g/dL   Status: Final  Hematocrit                                    Date: 10/29/2022  Value: 23.7 (A)    Ref range: 34.0 - 48.0 %      Status: Final  MCV                                           Date: 10/29/2022  Value: 83.2        Ref range: 80.0 - 99.9 fL     Status: Final  MCH                                           Date: 10/29/2022  Value: 27.4        Ref range: 26.0 - 35.0 pg     Status: Final  MCHC                                          Date: 10/29/2022  Value: 32.9        Ref range: 32.0 - 34.5 %      Status: Final  RDW                                           Date: 10/29/2022  Value: 14.5        Ref range: 11.5 - 15.0 fL     Status: Final  Platelets                                     Date: 10/29/2022  Value: 373         Ref range: 130 - 450 E9/L     Status: Final  MPV                                           Date: 10/29/2022  Value: 8.0         Ref range: 7.0 - 12.0 fL      Status: Final  Neutrophils %                                 Date: 10/29/2022  Value: 62.0        Ref range: 43.0 - 80.0 %      Status: Final  Immature Granulocytes %                       Date: 10/29/2022  Value: 0.6         Ref range: 0.0 - 5.0 %        Status: Final  Lymphocytes %                                 Date: 10/29/2022  Value: 26.3        Ref range: 20.0 - 42.0 %      Status: Final  Monocytes %                                   Date: 10/29/2022  Value: 10.7        Ref range: 2.0 - 12.0 % Status: Final  Eosinophils %                                 Date: 10/29/2022  Value: 0.3         Ref range: 0.0 - 6.0 %        Status: Final  Basophils %                                   Date: 10/29/2022  Value: 0.1         Ref range: 0.0 - 2.0 %        Status: Final  Neutrophils Absolute                          Date: 10/29/2022  Value: 6.03        Ref range: 1.80 - 7.30 E9/L   Status: Final  Immature Granulocytes #                       Date: 10/29/2022  Value: 0.06        Ref range: E9/L               Status: Final  Lymphocytes Absolute                          Date: 10/29/2022  Value: 2.56        Ref range: 1.50 - 4.00 E9/L   Status: Final  Monocytes Absolute                            Date: 10/29/2022  Value: 1.04 (A)    Ref range: 0.10 - 0.95 E9/L   Status: Final  Eosinophils Absolute                          Date: 10/29/2022  Value: 0.03 (A)    Ref range: 0.05 - 0.50 E9/L   Status: Final  Basophils Absolute                            Date: 10/29/2022  Value: 0.01        Ref range: 0.00 - 0.20 E9/L   Status: Final  Sodium                                        Date: 10/29/2022  Value: 128 (A)     Ref range: 132 - 146 mmol/L   Status: Final  Potassium                                     Date: 10/29/2022  Value: 3.7         Ref range: 3.5 - 5.0 mmol/L   Status: Final  Chloride                                      Date: 10/29/2022  Value: 94 (A)      Ref range: 98 - 107 mmol/L    Status: Final  CO2                                           Date: 10/29/2022  Value: 23          Ref range: 22 - 29 mmol/L     Status: Final  Anion Gap                                     Date: 10/29/2022  Value: 11          Ref range: 7 - 16 mmol/L      Status: Final  Glucose                                       Date: 10/29/2022  Value: 103 (A)     Ref range: 74 - 99 mg/dL      Status: Final  BUN                                           Date: 10/29/2022  Value: 12          Ref range: 6 - 20 mg/dL       Status: Final  Creatinine Date: 10/29/2022  Value: 0.9         Ref range: 0.5 - 1.0 mg/dL    Status: Final  Est, Glom Filt Rate                           Date: 10/29/2022  Value: >60         Ref range: >=60 mL/min/1.73   Status: Final                Comment: Pediatric calculator link  [email or web address]_calculatorped  Effective Oct 3, 2022  These results are not intended for use in patients  <25years of age. eGFR results are calculated without  a race factor using the 2021 CKD-EPI equation. Careful  clinical correlation is recommended, particularly when  comparing to results calculated using previous equations. The CKD-EPI equation is less accurate in patients with  extremes of muscle mass, extra-renal metabolism of  creatinine, excessive creatinine ingestion, or following  therapy that affects renal tubular secretion. Calcium                                       Date: 10/29/2022  Value: 9.4         Ref range: 8.6 - 10.2 mg/dL   Status: Final  Total Protein                                 Date: 10/29/2022  Value: 6.3 (A)     Ref range: 6.4 - 8.3 g/dL     Status: Final  Albumin                                       Date: 10/29/2022  Value: 4.0         Ref range: 3.5 - 5.2 g/dL     Status: Final  Total Bilirubin                               Date: 10/29/2022  Value: 0.2         Ref range: 0.0 - 1.2 mg/dL    Status: Final  Alkaline Phosphatase                          Date: 10/29/2022  Value: 99          Ref range: 35 - 104 U/L       Status: Final  ALT                                           Date: 10/29/2022  Value: 12          Ref range: 0 - 32 U/L         Status: Final  AST                                           Date: 10/29/2022  Value: 20          Ref range: 0 - 31 U/L         Status: Final  ------------    Radiology last 7 days:  CT HEAD WO CONTRAST    Result Date: 10/28/2022  0.9 cm acute lacunar infarct visualized in the head of the right caudate nucleus.  No evidence of acute territorial infarct is seen. CT HEAD WO CONTRAST    Result Date: 10/27/2022  Diffuse atrophy likely age related Findings compatible with small vessel ischemic changes. CTA NECK W CONTRAST    Result Date: 10/28/2022  1. Estimated stenosis of the proximal right and left internal carotid artery by NASCET criteria is not hemodynamically significant 2. Moderate atherosclerotic disease . 3. Right M1 occlusion Findings were called to Dr. Ioana Burgess This study was analyzed by the [address] ai algorithm. CT BRAIN PERFUSION    Result Date: 10/27/2022  Ischemic penumbra in the distribution of the right middle cerebral artery This study was analyzed by the [address] ai algorithm. CTA HEAD W CONTRAST    Result Date: 10/28/2022  1. Estimated stenosis of the proximal right and left internal carotid artery by NASCET criteria is not hemodynamically significant 2. Moderate atherosclerotic disease . 3. Right M1 occlusion Findings were called to Dr. Ioana Burgess This study was analyzed by the [address] ai algorithm.         [unfilled]    Discharge Medications    Current Discharge Medication List    START taking these medications    aspirin 81 MG chewable tablet  Take 1 tablet by mouth daily  Qty: 30 tablet Refills: 3    atorvastatin (LIPITOR) 80 MG tablet  Take 1 tablet by mouth nightly  Qty: 30 tablet Refills: 3    amLODIPine (NORVASC) 5 MG tablet  Take 1 tablet by mouth daily  Qty: 30 tablet Refills: 3    diclofenac sodium (VOLTAREN) 1 % GEL  Apply 2 g topically 2 times daily  Qty: 30 g Refills: 2    folic acid (FOLVITE) 1 MG tablet  Take 1 tablet by mouth daily  Qty: 30 tablet Refills: 3    senna (SENOKOT) 8.6 MG tablet  Take 1 tablet by mouth nightly  Qty: 30 tablet Refills: 0    clopidogrel (PLAVIX) 75 MG tablet  Take 1 tablet by mouth daily  Qty: 30 tablet Refills: 3    Multiple Vitamin (MULTIVITAMIN) TABS tablet  Take 1 tablet by mouth daily  Qty: 30 tablet Refills: 1    thiamine 100 MG tablet  Take 1 tablet by mouth daily  Qty: 30 tablet Refills: 3          Current Discharge Medication List        Current Discharge Medication List    CONTINUE these medications which have NOT CHANGED    cyclobenzaprine (FLEXERIL) 10 MG tablet  take 1 tablet by mouth twice a day    omeprazole (PRILOSEC) 20 MG delayed release capsule  Take 1 capsule by mouth every morning (before breakfast)  Qty: 30 capsule Refills: 0    Calcium Carbonate Antacid 400 MG CHEW  Take by mouth    losartan-hydrochlorothiazide (HYZAAR) 100-25 MG per tablet  Take 1 tablet by mouth daily   Refills: 0    fluticasone (FLONASE) 50 MCG/ACT nasal spray  1 spray by Each Nare route daily    albuterol sulfate  (90 Base) MCG/ACT inhaler  Inhale 2 puffs into the lungs every 6 hours as needed for Wheezing    NONFORMULARY  as needed Eye drops for allergies     DiphenhydrAMINE HCl (BENADRYL ALLERGY PO)  Take 50 mg by mouth daily           Current Discharge Medication List    STOP taking these medications    LORazepam (ATIVAN) 0.5 MG tablet  Comments:  Reason for Stopping:    naproxen (NAPROSYN) 500 MG tablet  Comments:  Reason for Stopping:    furosemide (LASIX) 20 MG tablet  Comments:  Reason for Stopping:    ibuprofen (ADVIL;MOTRIN) 800 MG tablet  Comments:  Reason for Stopping:          Time Spent on Discharge:  15 minutes were spent in patient examination, evaluation, counseling as well as medication reconciliation, prescriptions for required medications, discharge plan, and follow up.     Electronically signed by Melecio Martin MD on 10/30/22 at 11:43 AM EDT

## 2022-10-30 NOTE — CARE COORDINATION
Peer Recovery Support Note       Name: Yasmine Lewis  Date:10/30/2022         Chief Complaint   Patient presents with    Cerebrovascular Accident     Last known well 10pm last night, having some left sided weakness in both extremities with a left sided facial droop. A&Ox4            Peer Support met with patient. [x] Support and education provided  [x] Resources provided   [] Treatment referral:   [] Other:   [] Patient declined peer recovery services      Referred By:     Notes: I was called to assist with placement to a rehab facility. Patient is addiment about going home first. Patient's nurse, Doron Sandoval assisted with contacting patient's daughter Laura Gonzalez. Laura Gonzalez wants patient to go to [facility] in [place] but they are not open for admissions today. Laura Gonzalez was informed that I was able to contact [facility] for placement, but she declined that facility. Laura Gonzalez will assist patient with placement to [facility] after she is discharged.

## 2022-10-30 NOTE — PLAN OF CARE
Problem: Discharge Planning  Goal: Discharge to home or other facility with appropriate resources  Outcome: Completed     Problem: Skin/Tissue Integrity  Goal: Absence of new skin breakdown  Description: 1. Monitor for areas of redness and/or skin breakdown  2. Assess vascular access sites hourly  3. Every 4-6 hours minimum:  Change oxygen saturation probe site  4. Every 4-6 hours:  If on nasal continuous positive airway pressure, respiratory therapy assess nares and determine need for appliance change or resting period.   10/30/2022 1148 by Polo Sepulveda RN  Outcome: Completed  10/30/2022 0922 by Polo Sepulveda RN  Outcome: Adequate for Discharge     Problem: Safety - Adult  Goal: Free from fall injury  10/30/2022 1148 by Polo Sepulveda RN  Outcome: Completed  10/30/2022 0922 by Polo Sepulveda RN  Outcome: Progressing     Problem: ABCDS Injury Assessment  Goal: Absence of physical injury  10/30/2022 1148 by Polo Sepulveda RN  Outcome: Completed  10/30/2022 0922 by Polo Sepulveda RN  Outcome: Adequate for Discharge     Problem: Neurosensory - Adult  Goal: Achieves stable or improved neurological status  10/30/2022 1148 by Polo Sepulveda RN  Outcome: Completed  10/30/2022 0922 by Polo Sepulveda RN  Outcome: Progressing  Goal: Achieves maximal functionality and self care  10/30/2022 1148 by Polo Sepulveda RN  Outcome: Completed  10/30/2022 0922 by Polo Sepulveda RN  Outcome: Progressing     Problem: Cardiovascular - Adult  Goal: Maintains optimal cardiac output and hemodynamic stability  10/30/2022 1148 by Polo Sepulveda RN  Outcome: Completed  10/30/2022 0922 by Polo Sepulveda RN  Outcome: Adequate for Discharge     Problem: Skin/Tissue Integrity - Adult  Goal: Skin integrity remains intact  10/30/2022 1148 by Polo Sepulveda RN  Outcome: Completed  10/30/2022 0922 by Polo Sepulveda RN  Outcome: Adequate for Discharge     Problem: Musculoskeletal - Adult  Goal: Return mobility to safest level of function  10/30/2022 1148 by Zeyad Ortega RN  Outcome: Completed  10/30/2022 0922 by Zeyad Ortega RN  Outcome: Progressing     Problem: Pain  Goal: Verbalizes/displays adequate comfort level or baseline comfort level  10/30/2022 1148 by Zeyad Ortega RN  Outcome: Completed  10/30/2022 0922 by Zeyad Ortega RN  Outcome: Progressing     Problem: Chronic Conditions and Co-morbidities  Goal: Patient's chronic conditions and co-morbidity symptoms are monitored and maintained or improved  Outcome: Completed

## 2022-10-30 NOTE — PLAN OF CARE
Problem: Discharge Planning  Goal: Discharge to home or other facility with appropriate resources  Outcome: Progressing  Flowsheets (Taken 10/29/2022 0800 by Homer Fernandes RN)  Discharge to home or other facility with appropriate resources: Identify barriers to discharge with patient and caregiver     Problem: Skin/Tissue Integrity  Goal: Absence of new skin breakdown  Description: 1. Monitor for areas of redness and/or skin breakdown  2. Assess vascular access sites hourly  3. Every 4-6 hours minimum:  Change oxygen saturation probe site  4. Every 4-6 hours:  If on nasal continuous positive airway pressure, respiratory therapy assess nares and determine need for appliance change or resting period.   Outcome: Progressing     Problem: Safety - Adult  Goal: Free from fall injury  Outcome: Progressing     Problem: ABCDS Injury Assessment  Goal: Absence of physical injury  Outcome: Progressing     Problem: Neurosensory - Adult  Goal: Achieves stable or improved neurological status  Outcome: Progressing  Flowsheets (Taken 10/29/2022 0800 by Homer Fernandes RN)  Achieves stable or improved neurological status: Assess for and report changes in neurological status  Goal: Achieves maximal functionality and self care  Outcome: Progressing  Flowsheets (Taken 10/29/2022 0800 by Homer Fernandes RN)  Achieves maximal functionality and self care: Monitor swallowing and airway patency with patient fatigue and changes in neurological status     Problem: Cardiovascular - Adult  Goal: Maintains optimal cardiac output and hemodynamic stability  Outcome: Progressing  Flowsheets (Taken 10/29/2022 0800 by Homer Fernandes RN)  Maintains optimal cardiac output and hemodynamic stability: Monitor blood pressure and heart rate     Problem: Skin/Tissue Integrity - Adult  Goal: Skin integrity remains intact  Outcome: Progressing  Flowsheets (Taken 10/29/2022 0800 by Homer Fernandes RN)  Skin Integrity Remains Intact: Monitor for areas of redness and/or skin breakdown     Problem: Musculoskeletal - Adult  Goal: Return mobility to safest level of function  Outcome: Progressing  Flowsheets (Taken 10/29/2022 0800 by Lopez Barrett RN)  Return Mobility to Safest Level of Function: Assess patient stability and activity tolerance for standing, transferring and ambulating with or without assistive devices     Problem: Pain  Goal: Verbalizes/displays adequate comfort level or baseline comfort level  Outcome: Progressing  Flowsheets  Taken 10/29/2022 1600 by Lopez Barrett RN  Verbalizes/displays adequate comfort level or baseline comfort level: Assess pain using appropriate pain scale  Taken 10/29/2022 1200 by Lopez Barrett RN  Verbalizes/displays adequate comfort level or baseline comfort level: Assess pain using appropriate pain scale  Taken 10/29/2022 0800 by Lopez Barrett RN  Verbalizes/displays adequate comfort level or baseline comfort level: Assess pain using appropriate pain scale

## 2022-10-30 NOTE — PROGRESS NOTES
Confirmed with neurology, critical care that patient is ok for discharge.  Discharge order placed by Dr. Kong Celaya

## 2022-10-30 NOTE — PLAN OF CARE
Problem: Skin/Tissue Integrity  Goal: Absence of new skin breakdown  Description: 1. Monitor for areas of redness and/or skin breakdown  2. Assess vascular access sites hourly  3. Every 4-6 hours minimum:  Change oxygen saturation probe site  4. Every 4-6 hours:  If on nasal continuous positive airway pressure, respiratory therapy assess nares and determine need for appliance change or resting period.   10/30/2022 7197 by Krystal Jaramillo RN  Outcome: Adequate for Discharge  10/29/2022 2017 by Vincent Payan RN  Outcome: Progressing     Problem: Safety - Adult  Goal: Free from fall injury  10/30/2022 0922 by Krystal Jaramillo RN  Outcome: Progressing  10/29/2022 2017 by Vincent Payan RN  Outcome: Progressing     Problem: ABCDS Injury Assessment  Goal: Absence of physical injury  10/30/2022 6294 by Krystal Jaramillo RN  Outcome: Adequate for Discharge  10/29/2022 2017 by Vincent Payan RN  Outcome: Progressing     Problem: Neurosensory - Adult  Goal: Achieves stable or improved neurological status  10/30/2022 0922 by Krystal Jaramillo RN  Outcome: Progressing  10/29/2022 2017 by Vincent Payan RN  Outcome: Progressing  Flowsheets (Taken 10/29/2022 0800 by Jovanny Vega RN)  Achieves stable or improved neurological status: Assess for and report changes in neurological status  Goal: Achieves maximal functionality and self care  10/30/2022 0922 by Krystal Jaramillo RN  Outcome: Progressing  10/29/2022 2017 by Vincent Payan RN  Outcome: Progressing  Flowsheets (Taken 10/29/2022 0800 by Jovanny Vega RN)  Achieves maximal functionality and self care: Monitor swallowing and airway patency with patient fatigue and changes in neurological status     Problem: Cardiovascular - Adult  Goal: Maintains optimal cardiac output and hemodynamic stability  10/30/2022 0922 by Krystal Jaramillo RN  Outcome: Adequate for Discharge  10/29/2022 2017 by Vincent Payan RN  Outcome: Progressing  Flowsheets (Taken 10/29/2022 0800 by Bruce Mckenna RN)  Maintains optimal cardiac output and hemodynamic stability: Monitor blood pressure and heart rate     Problem: Skin/Tissue Integrity - Adult  Goal: Skin integrity remains intact  10/30/2022 0922 by Dakotah Velarde RN  Outcome: Adequate for Discharge  10/29/2022 2017 by Sophie Marshall RN  Outcome: Progressing  Flowsheets (Taken 10/29/2022 0800 by Bruce Mckenna RN)  Skin Integrity Remains Intact: Monitor for areas of redness and/or skin breakdown     Problem: Musculoskeletal - Adult  Goal: Return mobility to safest level of function  10/30/2022 0922 by Dakotah Velarde RN  Outcome: Progressing  10/29/2022 2017 by Sophie Marshall RN  Outcome: Progressing  Flowsheets (Taken 10/29/2022 0800 by Bruce Mckenna RN)  Return Mobility to Safest Level of Function: Assess patient stability and activity tolerance for standing, transferring and ambulating with or without assistive devices     Problem: Pain  Goal: Verbalizes/displays adequate comfort level or baseline comfort level  10/30/2022 0922 by Dakotah Velarde RN  Outcome: Progressing  10/29/2022 2017 by Sophie Marshall RN  Outcome: Progressing  Flowsheets  Taken 10/29/2022 1600 by Bruce Mckenna RN  Verbalizes/displays adequate comfort level or baseline comfort level: Assess pain using appropriate pain scale  Taken 10/29/2022 1200 by Bruce Mckenna RN  Verbalizes/displays adequate comfort level or baseline comfort level: Assess pain using appropriate pain scale  Taken 10/29/2022 0800 by Bruce Mckenna RN  Verbalizes/displays adequate comfort level or baseline comfort level: Assess pain using appropriate pain scale

## 2022-10-30 NOTE — PROGRESS NOTES
Intensive Care Unit  Critical Care Consult  Daily Progress Note 10/30/2022    Date of Admission: 10/27    EVENTS:   65 y.o. female who presents with left sided weakness and inability to walk. CT revealed R MCA occlusion with penumbra. She was taken to IR for thrombectomy. 10/28 patient very agitated, anxious. Started on PNB. Continues on cardene  10/29 required ativan PRN overnight. Was started on precedex through the night but discontinued d/t patient pulling at lines. She remains agitated and anxious, tossing and turning all over bed. 10/30 started on precedex overnight for agitation.  at bedside, we discussed plan for discharge along with patient's alcohol abuse; he states all the alcohol/liquor has been removed from the house. Peer Recovery to see patient for rehab potential    PHYSICAL EXAM:    /84   Pulse 72   Temp 98 °F (36.7 °C) (Temporal)   Resp 12   Ht 5' 8\" (1.727 m)   Wt 203 lb 0.7 oz (92.1 kg)   SpO2 96%   BMI 30.87 kg/m²     General appearance:  Comfortable. Pain Description: mild    GCS:    4 - Opens eyes on own   6 - Follows simple motor commands  5 - Alert and oriented    Pupil size: Left 3 mm  Right 3 mm  Pupil reaction: Yes  Wiggles fingers: Left Yes Right Yes  Hand grasp:   Left normal     Right normal  Wiggles toes: Left Yes    Right Yes  Plantar flexion:  Left normal    Right normal    CONSTITUTIONAL: no acute distress, lying in hospital bed  NEUROLOGIC: PERRL, oriented   CARDIOVASCULAR: S1 S2, regular rate, regular rhythm, no murmur/gallop/rub.  Monitor: NSR  PULMONARY: no rhonchi/rales/wheezes, no use of accessory muscles  RENAL: clear yellow urine  ABDOMEN: soft, nontender, nondistended, nontympanic, normal bowel sounds   MUSCULOSKELETAL: moves all extremities purposefully  SKIN/EXTREMITIES: no rashes/ecchymosis, no edema/clubbing, warm/dry, good capillary refill     LINES: PIV    CONSULTS:   Medicine  Neurology   for SBI    Past Medical History:   Diagnosis Date Asthma     CAD (coronary artery disease)     2011 had MI per patient  / follows with Dr. Chichi Lentz    Environmental allergies     Fracture     right 5th metacarpal    Heart palpitations     Hypertension     MI (myocardial infarction) [facility]) 2011    Syncope and collapse        ASSESSMENT/PLAN:       Neuro:  R MCA CVA s/p thrombectomy. Hx alcohol abuse. Monitor neuro status. Neurology following. PNB   CV: HTN. HX of HTN,. Monitor hemodynamics. BP goal less than 160  mmHg. PRN hydralazine & labetalol. Statin. Pulm: No acute issues. Hx tobacco abuse. Monitor RR & SpO2. Encourage cough and deep breathing. Supplemental oxygen PRN  GI: No acute issues. Diet. Monitor bowel function. Swallow eval when able  Renal:  chronic hyponatremia. Monitor BUN & Cr. Monitor electrolytes & replace as needed. Monitor urine output. ID: No acute issues   Endocrine: No acute issues. Hyperglycemia. Monitor BS.  MSK: No acute issues.  ROM. Turn & reposition. PT/OT  Heme: No acute issues. Monitor CBC. Bowel regimen: Glycolax. Last BM PTA  Pain control/Sedation: Tylenol. PNB  DVT prophylaxis: SCDs.   GI prophylaxis: Protonix, diet  Family update: will update when available  Code status: full   Disposition: discharged          Total time: 30 minutes    Electronically signed by Dar Rojas CNP on 10/30/2022 at 12:06 PM

## 2022-11-06 ENCOUNTER — HOSPITAL ENCOUNTER (EMERGENCY)
Age: 57
Discharge: HOME | End: 2022-11-07
Attending: EMERGENCY MEDICINE
Payer: MEDICARE

## 2022-11-06 DIAGNOSIS — N30.00 ACUTE CYSTITIS WITHOUT HEMATURIA: ICD-10-CM

## 2022-11-06 DIAGNOSIS — N12 PYELONEPHRITIS: ICD-10-CM

## 2022-11-06 DIAGNOSIS — R10.9 RIGHT FLANK PAIN: Primary | ICD-10-CM

## 2022-11-06 LAB
BACTERIA: ABNORMAL /HPF
BASOPHILS ABSOLUTE: 0.06 E9/L (ref 0–0.2)
BASOPHILS RELATIVE PERCENT: 1.1 % (ref 0–2)
BILIRUBIN URINE: NEGATIVE
BLOOD, URINE: NEGATIVE
CLARITY: CLEAR
COLOR: YELLOW
EOSINOPHILS ABSOLUTE: 0.07 E9/L (ref 0.05–0.5)
EOSINOPHILS RELATIVE PERCENT: 1.3 % (ref 0–6)
EPITHELIAL CELLS, UA: ABNORMAL /HPF
GLUCOSE URINE: NEGATIVE MG/DL
HCT VFR BLD CALC: 27.4 % (ref 34–48)
HEMOGLOBIN: 9 G/DL (ref 11.5–15.5)
IMMATURE GRANULOCYTES #: 0.01 E9/L
IMMATURE GRANULOCYTES %: 0.2 % (ref 0–5)
KETONES, URINE: NEGATIVE MG/DL
LEUKOCYTE ESTERASE, URINE: ABNORMAL
LYMPHOCYTES ABSOLUTE: 1.58 E9/L (ref 1.5–4)
LYMPHOCYTES RELATIVE PERCENT: 29 % (ref 20–42)
MCH RBC QN AUTO: 26.7 PG (ref 26–35)
MCHC RBC AUTO-ENTMCNC: 32.8 % (ref 32–34.5)
MCV RBC AUTO: 81.3 FL (ref 80–99.9)
MONOCYTES ABSOLUTE: 0.71 E9/L (ref 0.1–0.95)
MONOCYTES RELATIVE PERCENT: 13.1 % (ref 2–12)
NEUTROPHILS ABSOLUTE: 3.01 E9/L (ref 1.8–7.3)
NEUTROPHILS RELATIVE PERCENT: 55.3 % (ref 43–80)
NITRITE, URINE: NEGATIVE
PDW BLD-RTO: 14.5 FL (ref 11.5–15)
PH UA: 6 (ref 5–9)
PLATELET # BLD: 488 E9/L (ref 130–450)
PMV BLD AUTO: 8.2 FL (ref 7–12)
PROTEIN UA: NEGATIVE MG/DL
RBC # BLD: 3.37 E12/L (ref 3.5–5.5)
RBC UA: ABNORMAL /HPF (ref 0–2)
SPECIFIC GRAVITY UA: 1.02 (ref 1–1.03)
UROBILINOGEN, URINE: 0.2 E.U./DL
WBC # BLD: 5.4 E9/L (ref 4.5–11.5)
WBC UA: >20 /HPF (ref 0–5)

## 2022-11-06 PROCEDURE — 87088 URINE BACTERIA CULTURE: CPT

## 2022-11-06 PROCEDURE — 6360000002 HC RX W HCPCS: Performed by: EMERGENCY MEDICINE

## 2022-11-06 PROCEDURE — 96374 THER/PROPH/DIAG INJ IV PUSH: CPT

## 2022-11-06 PROCEDURE — 36415 COLL VENOUS BLD VENIPUNCTURE: CPT

## 2022-11-06 PROCEDURE — 80053 COMPREHEN METABOLIC PANEL: CPT

## 2022-11-06 PROCEDURE — 83735 ASSAY OF MAGNESIUM: CPT

## 2022-11-06 PROCEDURE — 81001 URINALYSIS AUTO W/SCOPE: CPT

## 2022-11-06 PROCEDURE — 87077 CULTURE AEROBIC IDENTIFY: CPT

## 2022-11-06 PROCEDURE — 87186 SC STD MICRODIL/AGAR DIL: CPT

## 2022-11-06 PROCEDURE — 85025 COMPLETE CBC W/AUTO DIFF WBC: CPT

## 2022-11-06 RX ORDER — MORPHINE SULFATE 4 MG/ML
INJECTION, SOLUTION INTRAMUSCULAR; INTRAVENOUS
Status: COMPLETED
Start: 2022-11-06 | End: 2022-11-07

## 2022-11-06 RX ORDER — MORPHINE SULFATE 4 MG/ML
4 INJECTION, SOLUTION INTRAMUSCULAR; INTRAVENOUS ONCE
Status: COMPLETED | OUTPATIENT
Start: 2022-11-07 | End: 2022-11-07

## 2022-11-06 RX ORDER — 0.9 % SODIUM CHLORIDE 0.9 %
1000 INTRAVENOUS SOLUTION INTRAVENOUS ONCE
Status: COMPLETED | OUTPATIENT
Start: 2022-11-06 | End: 2022-11-07

## 2022-11-06 RX ORDER — KETOROLAC TROMETHAMINE 30 MG/ML
15 INJECTION, SOLUTION INTRAMUSCULAR; INTRAVENOUS ONCE
Status: COMPLETED | OUTPATIENT
Start: 2022-11-06 | End: 2022-11-06

## 2022-11-06 RX ADMIN — KETOROLAC TROMETHAMINE 15 MG: 30 INJECTION, SOLUTION INTRAMUSCULAR; INTRAVENOUS at 23:37

## 2022-11-06 ASSESSMENT — LIFESTYLE VARIABLES
HOW MANY STANDARD DRINKS CONTAINING ALCOHOL DO YOU HAVE ON A TYPICAL DAY: PATIENT DOES NOT DRINK
HOW OFTEN DO YOU HAVE A DRINK CONTAINING ALCOHOL: NEVER

## 2022-11-06 ASSESSMENT — PAIN - FUNCTIONAL ASSESSMENT
PAIN_FUNCTIONAL_ASSESSMENT: PREVENTS OR INTERFERES SOME ACTIVE ACTIVITIES AND ADLS
PAIN_FUNCTIONAL_ASSESSMENT: 0-10

## 2022-11-06 ASSESSMENT — PAIN DESCRIPTION - FREQUENCY: FREQUENCY: CONTINUOUS

## 2022-11-06 ASSESSMENT — PAIN DESCRIPTION - ONSET: ONSET: ON-GOING

## 2022-11-06 ASSESSMENT — PAIN DESCRIPTION - ORIENTATION: ORIENTATION: RIGHT

## 2022-11-06 ASSESSMENT — PAIN DESCRIPTION - LOCATION: LOCATION: FLANK

## 2022-11-06 ASSESSMENT — PAIN SCALES - GENERAL
PAINLEVEL_OUTOF10: 10
PAINLEVEL_OUTOF10: 10

## 2022-11-06 ASSESSMENT — PAIN DESCRIPTION - DESCRIPTORS: DESCRIPTORS: ACHING

## 2022-11-06 ASSESSMENT — PAIN DESCRIPTION - PAIN TYPE: TYPE: ACUTE PAIN

## 2022-11-07 ENCOUNTER — APPOINTMENT (OUTPATIENT)
Dept: CT IMAGING | Age: 57
End: 2022-11-07
Payer: MEDICARE

## 2022-11-07 VITALS
HEART RATE: 82 BPM | BODY MASS INDEX: 30.41 KG/M2 | DIASTOLIC BLOOD PRESSURE: 90 MMHG | WEIGHT: 200 LBS | OXYGEN SATURATION: 95 % | RESPIRATION RATE: 20 BRPM | SYSTOLIC BLOOD PRESSURE: 150 MMHG | TEMPERATURE: 98.9 F

## 2022-11-07 LAB
ALBUMIN SERPL-MCNC: 4.6 G/DL (ref 3.5–5.2)
ALP BLD-CCNC: 138 U/L (ref 35–104)
ALT SERPL-CCNC: 24 U/L (ref 0–32)
ANION GAP SERPL CALCULATED.3IONS-SCNC: 15 MMOL/L (ref 7–16)
AST SERPL-CCNC: 29 U/L (ref 0–31)
BILIRUB SERPL-MCNC: <0.2 MG/DL (ref 0–1.2)
BUN BLDV-MCNC: 21 MG/DL (ref 6–20)
CALCIUM SERPL-MCNC: 9.7 MG/DL (ref 8.6–10.2)
CHLORIDE BLD-SCNC: 96 MMOL/L (ref 98–107)
CO2: 22 MMOL/L (ref 22–29)
CREAT SERPL-MCNC: 1.1 MG/DL (ref 0.5–1)
GFR SERPL CREATININE-BSD FRML MDRD: 59 ML/MIN/1.73
GLUCOSE BLD-MCNC: 102 MG/DL (ref 74–99)
MAGNESIUM: 1.6 MG/DL (ref 1.6–2.6)
POTASSIUM REFLEX MAGNESIUM: 3.4 MMOL/L (ref 3.5–5)
SODIUM BLD-SCNC: 133 MMOL/L (ref 132–146)
TOTAL PROTEIN: 7.8 G/DL (ref 6.4–8.3)

## 2022-11-07 PROCEDURE — 99285 EMERGENCY DEPT VISIT HI MDM: CPT

## 2022-11-07 PROCEDURE — 96374 THER/PROPH/DIAG INJ IV PUSH: CPT

## 2022-11-07 PROCEDURE — 96375 TX/PRO/DX INJ NEW DRUG ADDON: CPT

## 2022-11-07 PROCEDURE — 74177 CT ABD & PELVIS W/CONTRAST: CPT

## 2022-11-07 PROCEDURE — 2580000003 HC RX 258: Performed by: EMERGENCY MEDICINE

## 2022-11-07 PROCEDURE — 6370000000 HC RX 637 (ALT 250 FOR IP): Performed by: EMERGENCY MEDICINE

## 2022-11-07 PROCEDURE — 6360000002 HC RX W HCPCS: Performed by: EMERGENCY MEDICINE

## 2022-11-07 PROCEDURE — 2580000003 HC RX 258: Performed by: STUDENT IN AN ORGANIZED HEALTH CARE EDUCATION/TRAINING PROGRAM

## 2022-11-07 PROCEDURE — 6360000004 HC RX CONTRAST MEDICATION: Performed by: STUDENT IN AN ORGANIZED HEALTH CARE EDUCATION/TRAINING PROGRAM

## 2022-11-07 PROCEDURE — 6360000002 HC RX W HCPCS

## 2022-11-07 RX ORDER — SODIUM CHLORIDE 0.9 % (FLUSH) 0.9 %
10 SYRINGE (ML) INJECTION PRN
Status: COMPLETED | OUTPATIENT
Start: 2022-11-07 | End: 2022-11-07

## 2022-11-07 RX ORDER — LEVOFLOXACIN 750 MG/1
750 TABLET ORAL DAILY
Qty: 5 TABLET | Refills: 0 | Status: SHIPPED | OUTPATIENT
Start: 2022-11-07 | End: 2022-11-12

## 2022-11-07 RX ORDER — KETOROLAC TROMETHAMINE 30 MG/ML
15 INJECTION, SOLUTION INTRAMUSCULAR; INTRAVENOUS ONCE
Status: COMPLETED | OUTPATIENT
Start: 2022-11-07 | End: 2022-11-07

## 2022-11-07 RX ADMIN — IOPAMIDOL 75 ML: 755 INJECTION, SOLUTION INTRAVENOUS at 01:09

## 2022-11-07 RX ADMIN — SODIUM CHLORIDE, PRESERVATIVE FREE 10 ML: 5 INJECTION INTRAVENOUS at 01:09

## 2022-11-07 RX ADMIN — KETOROLAC TROMETHAMINE 15 MG: 30 INJECTION, SOLUTION INTRAMUSCULAR at 01:54

## 2022-11-07 RX ADMIN — MORPHINE SULFATE 4 MG: 4 INJECTION, SOLUTION INTRAMUSCULAR; INTRAVENOUS at 00:00

## 2022-11-07 RX ADMIN — SODIUM CHLORIDE 1000 ML: 9 INJECTION, SOLUTION INTRAVENOUS at 00:01

## 2022-11-07 RX ADMIN — LEVOFLOXACIN 750 MG: 500 TABLET, FILM COATED ORAL at 01:53

## 2022-11-07 ASSESSMENT — PAIN SCALES - GENERAL
PAINLEVEL_OUTOF10: 7
PAINLEVEL_OUTOF10: 10

## 2022-11-07 ASSESSMENT — PAIN - FUNCTIONAL ASSESSMENT
PAIN_FUNCTIONAL_ASSESSMENT: 0-10
PAIN_FUNCTIONAL_ASSESSMENT: 0-10
PAIN_FUNCTIONAL_ASSESSMENT: PREVENTS OR INTERFERES SOME ACTIVE ACTIVITIES AND ADLS
PAIN_FUNCTIONAL_ASSESSMENT: PREVENTS OR INTERFERES SOME ACTIVE ACTIVITIES AND ADLS

## 2022-11-07 ASSESSMENT — PAIN DESCRIPTION - ORIENTATION
ORIENTATION: RIGHT

## 2022-11-07 ASSESSMENT — PAIN DESCRIPTION - LOCATION
LOCATION: FLANK

## 2022-11-07 ASSESSMENT — PAIN DESCRIPTION - FREQUENCY
FREQUENCY: INTERMITTENT
FREQUENCY: CONTINUOUS

## 2022-11-07 ASSESSMENT — PAIN DESCRIPTION - PAIN TYPE
TYPE: ACUTE PAIN
TYPE: ACUTE PAIN

## 2022-11-07 ASSESSMENT — PAIN DESCRIPTION - DESCRIPTORS
DESCRIPTORS: ACHING

## 2022-11-07 ASSESSMENT — PAIN DESCRIPTION - ONSET
ONSET: ON-GOING
ONSET: ON-GOING

## 2022-11-07 NOTE — ED PROVIDER NOTES
internal fixation    IR MECHANICAL ART THROMBECTOMY INTRACRANIAL  10/27/2022    IR MECHANICAL ART THROMBECTOMY INTRACRANIAL 10/27/2022 Javy Oneill MD [facility]    LEG TENDON SURGERY      right leg 3/24/14       CURRENT MEDICATIONS       Previous Medications    ALBUTEROL SULFATE  (90 BASE) MCG/ACT INHALER    Inhale 2 puffs into the lungs every 6 hours as needed for Wheezing    AMLODIPINE (NORVASC) 5 MG TABLET    Take 1 tablet by mouth daily    ASPIRIN 81 MG CHEWABLE TABLET    Take 1 tablet by mouth daily    ATORVASTATIN (LIPITOR) 80 MG TABLET    Take 1 tablet by mouth nightly    CALCIUM CARBONATE ANTACID 400 MG CHEW    Take by mouth    CLOPIDOGREL (PLAVIX) 75 MG TABLET    Take 1 tablet by mouth daily    CYCLOBENZAPRINE (FLEXERIL) 10 MG TABLET    take 1 tablet by mouth twice a day    DICLOFENAC SODIUM (VOLTAREN) 1 % GEL    Apply 2 g topically 2 times daily    DIPHENHYDRAMINE HCL (BENADRYL ALLERGY PO)    Take 50 mg by mouth daily     FLUTICASONE (FLONASE) 50 MCG/ACT NASAL SPRAY    1 spray by Each Nare route daily    FOLIC ACID (FOLVITE) 1 MG TABLET    Take 1 tablet by mouth daily    LOSARTAN-HYDROCHLOROTHIAZIDE (HYZAAR) 100-25 MG PER TABLET    Take 1 tablet by mouth daily     MULTIPLE VITAMIN (MULTIVITAMIN) TABS TABLET    Take 1 tablet by mouth daily    NONFORMULARY    as needed Eye drops for allergies     OMEPRAZOLE (PRILOSEC) 20 MG DELAYED RELEASE CAPSULE    Take 1 capsule by mouth every morning (before breakfast)    SENNA (SENOKOT) 8.6 MG TABLET    Take 1 tablet by mouth nightly    THIAMINE 100 MG TABLET    Take 1 tablet by mouth daily       ALLERGIES     Tape [adhesive tape] and Pcn [penicillins]    FAMILY HISTORY       Family History   Problem Relation Age of Onset    Asthma Mother     High Blood Pressure Father     Diabetes Father     Asthma Father         SOCIAL HISTORY       Social History     Socioeconomic History    Marital status:    Tobacco Use    Smoking status: the interpretation per the Radiologist below, if available, at the time of submission of this note:  CT ABDOMEN PELVIS W IV CONTRAST Additional Contrast? None    Result Date: 11/7/2022  EXAMINATION: CT OF THE ABDOMEN AND PELVIS WITH CONTRAST 11/7/2022 1:01 am TECHNIQUE: CT of the abdomen and pelvis was performed with the administration of intravenous contrast. Multiplanar reformatted images are provided for review. Automated exposure control, iterative reconstruction, and/or weight based adjustment of the mA/kV was utilized to reduce the radiation dose to as low as reasonably achievable. COMPARISON: None. HISTORY: ORDERING SYSTEM PROVIDED HISTORY: R-flank pain TECHNOLOGIST PROVIDED HISTORY: Reason for exam:->R-flank pain Additional Contrast?->None Decision Support Exception - unselect if not a suspected or confirmed emergency medical condition->Emergency Medical Condition (MA) FINDINGS: Lower Chest: Moderate retrocardiac hiatal hernia. Basilar atelectasis. Organs: Liver, biliary tree, bilateral adrenal glands, bilateral kidneys, spleen and pancreas are normal. GI/Bowel: No ileus or obstruction. Normal appendix right lower quadrant. Pelvis: No adnexal mass or free fluid. Peritoneum/Retroperitoneum: Negative. Bones/Soft Tissues: Diffuse moderate spondylosis. No aggressive or suspicious lesion. 1. No acute disease. 2. No nephrolithiasis, ureterolithiasis or hydronephrosis. 3. Normal appendix right lower quadrant. RECOMMENDATIONS: Careful clinical correlation and follow up recommended. EKG:  No EKG was obtained during this encounter.     LABS:  Labs Reviewed   URINALYSIS WITH MICROSCOPIC - Abnormal; Notable for the following components:       Result Value    Leukocyte Esterase, Urine MODERATE (*)     WBC, UA >20 (*)     Bacteria, UA FEW (*)     All other components within normal limits   CBC WITH AUTO DIFFERENTIAL - Abnormal; Notable for the following components:    RBC 3.37 (*)     Hemoglobin 9.0 (*) Hematocrit 27.4 (*)     Platelets 928 (*)     Monocytes % 13.1 (*)     All other components within normal limits   COMPREHENSIVE METABOLIC PANEL W/ REFLEX TO MG FOR LOW K - Abnormal; Notable for the following components:    Potassium reflex Magnesium 3.4 (*)     Chloride 96 (*)     Glucose 102 (*)     BUN 21 (*)     Creatinine 1.1 (*)     Alkaline Phosphatase 138 (*)     All other components within normal limits   CULTURE, URINE   MAGNESIUM      Please note that all other labs were within normal range or not returned as of this dictation. REVAL:   Re-Evaluation: After the initial encounter and interventions, the patient was re-assessed. Repeat physical examination demonstrates improving the patient's overall discomfort. Diagnostic studies have returned. Urinalysis is demonstrating evidence of leukocyte positive and increased white blood cell urine. There is no red blood cells and there is few bacteria. Culture will be sent. Presumptive positive urine. Patient be placed on Levaquin for her penicillin allergy and questionable cephalosporin allergy. There is no leukocytosis present, creatinine is stable at 1.1. CT scan without any other acute emergent conditions identified, interpreted as no nephrolithiasis or ureterolithiasis or hydronephrosis. Patient stable for outpatient follow-up. Strict return precautions initiated. CRITICAL CARE TIME AND PROCEDURE LIST   Total Critical Care time was 0 minutes, excluding separately reportable procedures. If documented other than zero, there was a high probability of clinically significant/life threatening deterioration in the patient's condition which required my urgent intervention with procedure list detailed below. Procedure List:  Pulse ox interpretation    FINAL IMPRESSION      1. Right flank pain    2. Pyelonephritis    3.  Acute cystitis without hematuria          DISPOSITION/PLAN   DISPOSITION Decision To Discharge 11/07/2022 01:52:04 AM      PATIENT REFERRED TO:  Pierre Reyna MD  [address] [place] [address] [address]  [place] [phone]          [facility] Physician Referral  [phone]  Schedule an appointment as soon as possible for a visit       DISCHARGE MEDICATIONS:  New Prescriptions    LEVOFLOXACIN (LEVAQUIN) 750 MG TABLET    Take 1 tablet by mouth daily for 5 days          (Please note:  Portions of this note were completed with a voice recognition program.  Efforts were made to edit the dictations but occasionally words and phrases are mis-transcribed.)  Form v2016. [id number].[id number]    aJime Yo DO (electronically signed)  Emergency Medicine Provider         Jaime Yo DO  11/07/22 [id number]

## 2022-11-10 LAB
ORGANISM: ABNORMAL
URINE CULTURE, ROUTINE: ABNORMAL

## 2022-12-02 ENCOUNTER — OFFICE VISIT (OUTPATIENT)
Dept: NEUROLOGY | Age: 57
End: 2022-12-02
Payer: MEDICARE

## 2022-12-02 VITALS
BODY MASS INDEX: 29.5 KG/M2 | TEMPERATURE: 97.9 F | DIASTOLIC BLOOD PRESSURE: 83 MMHG | SYSTOLIC BLOOD PRESSURE: 150 MMHG | WEIGHT: 194 LBS | HEART RATE: 79 BPM

## 2022-12-02 DIAGNOSIS — I63.239 CAROTID ARTERY STENOSIS WITH CEREBRAL INFARCTION (HCC): ICD-10-CM

## 2022-12-02 DIAGNOSIS — I63.511 ACUTE ISCHEMIC RIGHT MCA STROKE (HCC): Primary | ICD-10-CM

## 2022-12-02 PROCEDURE — 1036F TOBACCO NON-USER: CPT | Performed by: PSYCHIATRY & NEUROLOGY

## 2022-12-02 PROCEDURE — 3017F COLORECTAL CA SCREEN DOC REV: CPT | Performed by: PSYCHIATRY & NEUROLOGY

## 2022-12-02 PROCEDURE — 99213 OFFICE O/P EST LOW 20 MIN: CPT | Performed by: PSYCHIATRY & NEUROLOGY

## 2022-12-02 PROCEDURE — G8428 CUR MEDS NOT DOCUMENT: HCPCS | Performed by: PSYCHIATRY & NEUROLOGY

## 2022-12-02 PROCEDURE — G8484 FLU IMMUNIZE NO ADMIN: HCPCS | Performed by: PSYCHIATRY & NEUROLOGY

## 2022-12-02 PROCEDURE — G8419 CALC BMI OUT NRM PARAM NOF/U: HCPCS | Performed by: PSYCHIATRY & NEUROLOGY

## 2022-12-02 NOTE — PROGRESS NOTES
Neuro endovascular Surgery Consultation          Dylan Carreon is a 65 y.o. Woman who is here for evaluation and management of  post Right MCA thrombectomy          Past Medical History:     Past Medical History:   Diagnosis Date    Asthma     CAD (coronary artery disease)      had MI per patient  / follows with Dr. Sally Jose    CVA (cerebral vascular accident) [facility]) 10/28/2022    Environmental allergies     Fracture     right 5th metacarpal    Heart palpitations     Hypertension     MI (myocardial infarction) ([address]     Syncope and collapse        Past Surgical History:     Past Surgical History:   Procedure Laterality Date    ANKLE SURGERY Right 2013     SECTION      x2    ENDOMETRIAL ABLATION  2014    FINGER SURGERY Right 2017    right 5th metacarpal open reduction internal fixation    IR MECHANICAL ART THROMBECTOMY INTRACRANIAL  10/27/2022    IR MECHANICAL ART THROMBECTOMY INTRACRANIAL 10/27/2022 Jose Carranza MD [facility]    LEG TENDON SURGERY      right leg 3/24/14       Allergies:     Tape [adhesive tape] and Pcn [penicillins]    Medications:     Prior to Admission medications    Medication Sig Start Date End Date Taking?  Authorizing Provider   amLODIPine (NORVASC) 5 MG tablet Take 1 tablet by mouth daily 10/31/22  Yes Darren Varela MD   diclofenac sodium (VOLTAREN) 1 % GEL Apply 2 g topically 2 times daily 10/30/22  Yes Darren Varela MD   Multiple Vitamin (MULTIVITAMIN) TABS tablet Take 1 tablet by mouth daily 10/31/22  Yes Darren Varela MD   thiamine 100 MG tablet Take 1 tablet by mouth daily 10/31/22  Yes Darren Varela MD   cyclobenzaprine (FLEXERIL) 10 MG tablet take 1 tablet by mouth twice a day 21  Yes Historical Provider, MD   omeprazole (PRILOSEC) 20 MG delayed release capsule Take 1 capsule by mouth every morning (before breakfast) 21  Yes Mikhail Ndiaye MD   losartan-hydrochlorothiazide (HYZAAR) 100-25 MG per tablet Take 1 tablet by mouth daily  1/10/19  Yes Historical Provider, MD   albuterol sulfate  (90 Base) MCG/ACT inhaler Inhale 2 puffs into the lungs every 6 hours as needed for Wheezing   Yes Historical Provider, MD   DiphenhydrAMINE HCl (BENADRYL ALLERGY PO) Take 50 mg by mouth daily    Yes Historical Provider, MD   aspirin 81 MG chewable tablet Take 1 tablet by mouth daily  Patient not taking: Reported on 2022 10/31/22   Bolivar Byrnes MD   atorvastatin (LIPITOR) 80 MG tablet Take 1 tablet by mouth nightly 10/30/22   Bolivar Byrnes MD   folic acid (FOLVITE) 1 MG tablet Take 1 tablet by mouth daily 10/31/22   Bolivar Byrnes MD   clopidogrel (PLAVIX) 75 MG tablet Take 1 tablet by mouth daily 10/31/22   Bolivar Byrnes MD   Calcium Carbonate Antacid 400 MG CHEW Take by mouth  Patient not taking: Reported on 2022    Historical Provider, MD   fluticasone (FLONASE) 50 MCG/ACT nasal spray 1 spray by Each Nare route daily  Patient not taking: Reported on 2022    Historical Provider, MD   NONFORMULARY as needed Eye drops for allergies   Patient not taking: Reported on 2022    Historical Provider, MD       Social History:     Social History     Tobacco Use    Smoking status: Former     Packs/day: 2.00     Years: 6.00     Pack years: 12.00     Types: Cigarettes     Quit date: 2011     Years since quittin.3    Smokeless tobacco: Never   Substance Use Topics    Alcohol use:  Yes     Alcohol/week: 42.0 standard drinks     Types: 42 Cans of beer per week     Comment: daily    Drug use: No       Review of Systems:     No chest pain or palpitations  No SOB  No vertigo, lightheadedness or loss of consciousness  No falls, tripping or stumbling  No incontinence of bowels or bladder  No itching or bruising appreciated  No numbness, tingling or focal arm/leg weakness    ROS otherwise negative     Family History:     Family History   Problem Relation Age of Onset Asthma Mother     High Blood Pressure Father     Diabetes Father     Asthma Father         History of Present Illness: This is a 76-year-old relatively healthy female who presented on 10/27/2022 with acute ischemic stroke right-sided weakness and inability to walk. Subsequently she had a right MCA occlusion on CTA and was taken for an emergent thrombectomy postoperatively she remarkably improved to near normal.   is at visit today. She does have some bruising otherwise she is tolerating her medications pretty good. Diagnosis/Findings:   1. Acute Right MCA m1 occlusion with tandem small M2 thrombus also noticed  2. S/p Thrombectomy of both with TICI 3 recanalization   3. Complex plaque of the right ICA stenosis about 50%    Objective:   BP (!) 150/83   Pulse 79   Temp 97.9 °F (36.6 °C)   Wt 194 lb (88 kg)   BMI 29.50 kg/m²      General appearance: alert, appears stated age and cooperative  Head: Normocephalic, without obvious abnormality, atraumatic  Neck: no adenopathy, no carotid bruit and limited ROM  Lungs: clear to auscultation bilaterally  Heart: regular rate and rhythm  Extremities: no cyanosis or edema  Pulses: 2+ and symmetric  Skin: no rashes or lesions     Constitutional: Well developed, well nourished and in no acute distress. Respiratory: Clear to auscultation bilaterally with no use of accessory muscles during respiration. Cardiovascular:  No murmurs auscultated. Carotid arteries without bruits. Pedal pulses and radial pulses 2+ bilaterally. No edema in all four extremities. Mental Status:    Alert and oriented to person, place, and time. Recent and remote memory: Intact  Attention and concentration: Intact  Speech and language : Intact  Fund of knowledge: Intact  Judgement and Insight: Intact    Cranial Nerves:     II: Visual Fields: Full to confrontation bilaterally   III, IV, VI: Pupils: equally round and reactive to light, 3  to 2  mm bilaterally.        EOMs: full with no nystagmus. V: Sensation intact to light touch and pin prick sensation bilaterally  VII: symmetric and strong in the upper and lower face bilaterally  VIII: Hearing intact to finger rub bilaterally   IX,X: Palate elevates symmetrically. XI: head turn (sternocleidomastoid) and shoulder shrug (trapezius) 5/5 bilaterally   XII: Tongue is midline upon protrusion    Motor:   Normal tone and bulk. Normal fine finger movements. No pronator drift. No resting tremors, postural tremors or myoclonus. Upper Extremity Right Left  Lower Extremity Right Left  Deltoid              5 5      Hip Flexion             5 5  Biceps              5 5   Hip Extension             5 5  Triceps                        5 5   Hip Adduction             5 5  Wrist Extension 5 5   Knee Extension 5 5  Wrist Flexion             5 5                Knee Flexion 5 5  Index Finger Flexion 5 5  Ankle Dorsiflexion 5 5  Finger Extension 5 5  Ankle Plantarflexion 5 5  APB                        5 5   Extensor Hallucis Longus 5 5           Sensory:  Intact light touch and pinprick in upper and lower extremities bilaterally. Intact proprioception and vibration sense in upper and lower extremities bilaterally. Coordination:   Alternating hand and foot movements intact in the UE and LE bilaterally  Finger-to-nose and Heel-to-shin with no ataxia or intention tremor bilaterally    Gait/Station:   Patient rises from a seated position without assistance. Romberg's test negative. Gait pattern is without hesitation, a wide-base, and of normal stride length. Heel, toe and tandem walking are intact.     Laboratory/Radiology:     CBC:   Lab Results   Component Value Date/Time    WBC 5.4 11/06/2022 11:20 PM    RBC 3.37 11/06/2022 11:20 PM    HGB 9.0 11/06/2022 11:20 PM    HCT 27.4 11/06/2022 11:20 PM    MCV 81.3 11/06/2022 11:20 PM    MCH 26.7 11/06/2022 11:20 PM    MCHC 32.8 11/06/2022 11:20 PM    RDW 14.5 11/06/2022 11:20 PM     11/06/2022 11:20 PM    MPV 8.2 11/06/2022 11:20 PM     CBC with Differential:    Lab Results   Component Value Date/Time    WBC 5.4 11/06/2022 11:20 PM    RBC 3.37 11/06/2022 11:20 PM    HGB 9.0 11/06/2022 11:20 PM    HCT 27.4 11/06/2022 11:20 PM     11/06/2022 11:20 PM    MCV 81.3 11/06/2022 11:20 PM    MCH 26.7 11/06/2022 11:20 PM    MCHC 32.8 11/06/2022 11:20 PM    RDW 14.5 11/06/2022 11:20 PM    SEGSPCT 72 05/24/2011 06:55 AM    LYMPHOPCT 29.0 11/06/2022 11:20 PM    MONOPCT 13.1 11/06/2022 11:20 PM    BASOPCT 1.1 11/06/2022 11:20 PM    MONOSABS 0.71 11/06/2022 11:20 PM    LYMPHSABS 1.58 11/06/2022 11:20 PM    EOSABS 0.07 11/06/2022 11:20 PM    BASOSABS 0.06 11/06/2022 11:20 PM     WBC:    Lab Results   Component Value Date/Time    WBC 5.4 11/06/2022 11:20 PM     Platelets:    Lab Results   Component Value Date/Time     11/06/2022 11:20 PM     Hemoglobin/Hematocrit:    Lab Results   Component Value Date/Time    HGB 9.0 11/06/2022 11:20 PM    HCT 27.4 11/06/2022 11:20 PM     CMP:    Lab Results   Component Value Date/Time     11/06/2022 11:20 PM    K 3.4 11/06/2022 11:20 PM    CL 96 11/06/2022 11:20 PM    CO2 22 11/06/2022 11:20 PM    BUN 21 11/06/2022 11:20 PM    CREATININE 1.1 11/06/2022 11:20 PM    GFRAA >60 07/27/2021 04:41 PM    LABGLOM 59 11/06/2022 11:20 PM    GLUCOSE 102 11/06/2022 11:20 PM    GLUCOSE 90 05/25/2011 05:25 AM    PROT 7.8 11/06/2022 11:20 PM    LABALBU 4.6 11/06/2022 11:20 PM    LABALBU 5.1 05/24/2011 06:55 AM    CALCIUM 9.7 11/06/2022 11:20 PM    BILITOT <0.2 11/06/2022 11:20 PM    ALKPHOS 138 11/06/2022 11:20 PM    AST 29 11/06/2022 11:20 PM    ALT 24 11/06/2022 11:20 PM       I independently reviewed the labs and imaging studies today. CTA/CTP and angiograms were reviewed with the patient today. I have personally shown her to the pictures. Assessment:       Encounter Diagnoses   Name Primary?     Acute ischemic right MCA stroke ([address] Yes    Carotid stenosis, asymptomatic, right          Plan:         NIHSS of 0 , MRS 0    Patient had a significant carotid stenosis however post thrombectomy this carotid stenosis had improved. I would recommend repeat follow-up angiogram to recheck the carotid. Patient verbalizes understanding. Risk benefits and alternatives of an angiogram were discussed. Patient is advised to continue aspirin and Plavix and Lipitor. She does have some bruising however the risks of the stroke are significantly high in this patient who is doing remarkably well post thrombectomy. We will schedule her for follow-up angiogram.    Lory Ferrera MD  11:40 AM  12/2/2022    I spent 35 minutes with the patient, with 50% or more counseling them on their diagnosis, diagnostic workup and treatment options.

## 2022-12-08 ENCOUNTER — TELEPHONE (OUTPATIENT)
Dept: CASE MANAGEMENT | Age: 57
End: 2022-12-08

## 2022-12-08 NOTE — TELEPHONE ENCOUNTER
No authorization is required for CPT code 32264 due to patient's insurance.      Electronically signed by Zena Goetz on 12/8/22 at 4:31 PM EST

## 2022-12-17 ENCOUNTER — APPOINTMENT (OUTPATIENT)
Dept: CT IMAGING | Age: 57
End: 2022-12-17
Payer: MEDICARE

## 2022-12-17 ENCOUNTER — APPOINTMENT (OUTPATIENT)
Dept: GENERAL RADIOLOGY | Age: 57
End: 2022-12-17
Payer: MEDICARE

## 2022-12-17 ENCOUNTER — HOSPITAL ENCOUNTER (EMERGENCY)
Age: 57
Discharge: HOME | End: 2022-12-18
Attending: STUDENT IN AN ORGANIZED HEALTH CARE EDUCATION/TRAINING PROGRAM
Payer: MEDICARE

## 2022-12-17 DIAGNOSIS — N12 PYELONEPHRITIS: Primary | ICD-10-CM

## 2022-12-17 DIAGNOSIS — R91.8 GROUND GLASS OPACITY PRESENT ON IMAGING OF LUNG: ICD-10-CM

## 2022-12-17 LAB
ALBUMIN SERPL-MCNC: 4.7 G/DL (ref 3.5–5.2)
ALP BLD-CCNC: 143 U/L (ref 35–104)
ALT SERPL-CCNC: 23 U/L (ref 0–32)
ANION GAP SERPL CALCULATED.3IONS-SCNC: 14 MMOL/L (ref 7–16)
AST SERPL-CCNC: 28 U/L (ref 0–31)
BACTERIA: ABNORMAL /HPF
BASOPHILS ABSOLUTE: 0.06 E9/L (ref 0–0.2)
BASOPHILS RELATIVE PERCENT: 1.1 % (ref 0–2)
BILIRUB SERPL-MCNC: <0.2 MG/DL (ref 0–1.2)
BILIRUBIN URINE: NEGATIVE
BLOOD, URINE: NEGATIVE
BUN BLDV-MCNC: 15 MG/DL (ref 6–20)
CALCIUM SERPL-MCNC: 9.7 MG/DL (ref 8.6–10.2)
CHLORIDE BLD-SCNC: 93 MMOL/L (ref 98–107)
CLARITY: CLEAR
CO2: 25 MMOL/L (ref 22–29)
COLOR: ABNORMAL
CREAT SERPL-MCNC: 1 MG/DL (ref 0.5–1)
EOSINOPHILS ABSOLUTE: 0.12 E9/L (ref 0.05–0.5)
EOSINOPHILS RELATIVE PERCENT: 2.2 % (ref 0–6)
EPITHELIAL CELLS, UA: ABNORMAL /HPF
GFR SERPL CREATININE-BSD FRML MDRD: >60 ML/MIN/1.73
GLUCOSE BLD-MCNC: 123 MG/DL (ref 74–99)
GLUCOSE URINE: NEGATIVE MG/DL
HCT VFR BLD CALC: 28.1 % (ref 34–48)
HEMOGLOBIN: 9.2 G/DL (ref 11.5–15.5)
IMMATURE GRANULOCYTES #: 0.03 E9/L
IMMATURE GRANULOCYTES %: 0.6 % (ref 0–5)
KETONES, URINE: NEGATIVE MG/DL
LACTIC ACID: 1 MMOL/L (ref 0.5–2.2)
LEUKOCYTE ESTERASE, URINE: ABNORMAL
LIPASE: 29 U/L (ref 13–60)
LYMPHOCYTES ABSOLUTE: 1.63 E9/L (ref 1.5–4)
LYMPHOCYTES RELATIVE PERCENT: 30.2 % (ref 20–42)
MCH RBC QN AUTO: 24.2 PG (ref 26–35)
MCHC RBC AUTO-ENTMCNC: 32.7 % (ref 32–34.5)
MCV RBC AUTO: 73.9 FL (ref 80–99.9)
MONOCYTES ABSOLUTE: 0.87 E9/L (ref 0.1–0.95)
MONOCYTES RELATIVE PERCENT: 16.1 % (ref 2–12)
NEUTROPHILS ABSOLUTE: 2.68 E9/L (ref 1.8–7.3)
NEUTROPHILS RELATIVE PERCENT: 49.8 % (ref 43–80)
NITRITE, URINE: POSITIVE
PDW BLD-RTO: 15.3 FL (ref 11.5–15)
PH UA: 5.5 (ref 5–9)
PLATELET # BLD: 459 E9/L (ref 130–450)
PMV BLD AUTO: 7.8 FL (ref 7–12)
POTASSIUM REFLEX MAGNESIUM: 3.6 MMOL/L (ref 3.5–5)
PROTEIN UA: NEGATIVE MG/DL
RBC # BLD: 3.8 E12/L (ref 3.5–5.5)
RBC UA: ABNORMAL /HPF (ref 0–2)
SODIUM BLD-SCNC: 132 MMOL/L (ref 132–146)
SPECIFIC GRAVITY UA: 1.01 (ref 1–1.03)
TOTAL PROTEIN: 7.5 G/DL (ref 6.4–8.3)
TROPONIN, HIGH SENSITIVITY: 13 NG/L (ref 0–9)
UROBILINOGEN, URINE: 1 E.U./DL
WBC # BLD: 5.4 E9/L (ref 4.5–11.5)
WBC UA: ABNORMAL /HPF (ref 0–5)

## 2022-12-17 PROCEDURE — 36415 COLL VENOUS BLD VENIPUNCTURE: CPT

## 2022-12-17 PROCEDURE — 83605 ASSAY OF LACTIC ACID: CPT

## 2022-12-17 PROCEDURE — 80053 COMPREHEN METABOLIC PANEL: CPT

## 2022-12-17 PROCEDURE — 6360000002 HC RX W HCPCS: Performed by: STUDENT IN AN ORGANIZED HEALTH CARE EDUCATION/TRAINING PROGRAM

## 2022-12-17 PROCEDURE — 96372 THER/PROPH/DIAG INJ SC/IM: CPT

## 2022-12-17 PROCEDURE — 87636 SARSCOV2 & INF A&B AMP PRB: CPT

## 2022-12-17 PROCEDURE — 96375 TX/PRO/DX INJ NEW DRUG ADDON: CPT

## 2022-12-17 PROCEDURE — 96374 THER/PROPH/DIAG INJ IV PUSH: CPT

## 2022-12-17 PROCEDURE — 81001 URINALYSIS AUTO W/SCOPE: CPT

## 2022-12-17 PROCEDURE — 74176 CT ABD & PELVIS W/O CONTRAST: CPT

## 2022-12-17 PROCEDURE — 83690 ASSAY OF LIPASE: CPT

## 2022-12-17 PROCEDURE — 71045 X-RAY EXAM CHEST 1 VIEW: CPT

## 2022-12-17 PROCEDURE — 84484 ASSAY OF TROPONIN QUANT: CPT

## 2022-12-17 PROCEDURE — 6370000000 HC RX 637 (ALT 250 FOR IP): Performed by: STUDENT IN AN ORGANIZED HEALTH CARE EDUCATION/TRAINING PROGRAM

## 2022-12-17 PROCEDURE — 85025 COMPLETE CBC W/AUTO DIFF WBC: CPT

## 2022-12-17 PROCEDURE — 99284 EMERGENCY DEPT VISIT MOD MDM: CPT

## 2022-12-17 PROCEDURE — 87088 URINE BACTERIA CULTURE: CPT

## 2022-12-17 RX ORDER — KETOROLAC TROMETHAMINE 30 MG/ML
15 INJECTION, SOLUTION INTRAMUSCULAR; INTRAVENOUS ONCE
Status: COMPLETED | OUTPATIENT
Start: 2022-12-17 | End: 2022-12-17

## 2022-12-17 RX ORDER — ORPHENADRINE CITRATE 30 MG/ML
60 INJECTION INTRAMUSCULAR; INTRAVENOUS ONCE
Status: COMPLETED | OUTPATIENT
Start: 2022-12-17 | End: 2022-12-17

## 2022-12-17 RX ORDER — OXYCODONE HYDROCHLORIDE AND ACETAMINOPHEN 5; 325 MG/1; MG/1
1 TABLET ORAL ONCE
Status: COMPLETED | OUTPATIENT
Start: 2022-12-17 | End: 2022-12-17

## 2022-12-17 RX ORDER — FENTANYL CITRATE 50 UG/ML
50 INJECTION, SOLUTION INTRAMUSCULAR; INTRAVENOUS ONCE
Status: COMPLETED | OUTPATIENT
Start: 2022-12-17 | End: 2022-12-17

## 2022-12-17 RX ORDER — MORPHINE SULFATE 4 MG/ML
4 INJECTION, SOLUTION INTRAMUSCULAR; INTRAVENOUS ONCE
Status: DISCONTINUED | OUTPATIENT
Start: 2022-12-17 | End: 2022-12-17

## 2022-12-17 RX ADMIN — ORPHENADRINE CITRATE 60 MG: 30 INJECTION INTRAMUSCULAR; INTRAVENOUS at 23:40

## 2022-12-17 RX ADMIN — OXYCODONE AND ACETAMINOPHEN 1 TABLET: 5; 325 TABLET ORAL at 23:37

## 2022-12-17 RX ADMIN — FENTANYL CITRATE 50 MCG: 50 INJECTION, SOLUTION INTRAMUSCULAR; INTRAVENOUS at 22:50

## 2022-12-17 RX ADMIN — KETOROLAC TROMETHAMINE 15 MG: 30 INJECTION, SOLUTION INTRAMUSCULAR; INTRAVENOUS at 22:50

## 2022-12-17 ASSESSMENT — PAIN SCALES - GENERAL
PAINLEVEL_OUTOF10: 10
PAINLEVEL_OUTOF10: 10
PAINLEVEL_OUTOF10: 7
PAINLEVEL_OUTOF10: 7

## 2022-12-17 ASSESSMENT — PAIN DESCRIPTION - ONSET: ONSET: ON-GOING

## 2022-12-17 ASSESSMENT — PAIN DESCRIPTION - PAIN TYPE: TYPE: ACUTE PAIN

## 2022-12-17 ASSESSMENT — PAIN DESCRIPTION - LOCATION: LOCATION: FLANK

## 2022-12-17 ASSESSMENT — PAIN DESCRIPTION - ORIENTATION: ORIENTATION: RIGHT

## 2022-12-17 ASSESSMENT — PAIN DESCRIPTION - DESCRIPTORS: DESCRIPTORS: SHARP

## 2022-12-17 ASSESSMENT — PAIN DESCRIPTION - FREQUENCY: FREQUENCY: CONTINUOUS

## 2022-12-18 VITALS
WEIGHT: 196 LBS | OXYGEN SATURATION: 99 % | DIASTOLIC BLOOD PRESSURE: 84 MMHG | HEART RATE: 88 BPM | RESPIRATION RATE: 16 BRPM | BODY MASS INDEX: 29.8 KG/M2 | SYSTOLIC BLOOD PRESSURE: 132 MMHG | TEMPERATURE: 98.2 F

## 2022-12-18 LAB
INFLUENZA A: NOT DETECTED
INFLUENZA B: NOT DETECTED
SARS-COV-2 RNA, RT PCR: NOT DETECTED
TROPONIN, HIGH SENSITIVITY: 11 NG/L (ref 0–9)

## 2022-12-18 PROCEDURE — 6370000000 HC RX 637 (ALT 250 FOR IP): Performed by: STUDENT IN AN ORGANIZED HEALTH CARE EDUCATION/TRAINING PROGRAM

## 2022-12-18 RX ORDER — CEFDINIR 300 MG/1
300 CAPSULE ORAL ONCE
Status: COMPLETED | OUTPATIENT
Start: 2022-12-18 | End: 2022-12-18

## 2022-12-18 RX ORDER — OXYCODONE HYDROCHLORIDE AND ACETAMINOPHEN 5; 325 MG/1; MG/1
1 TABLET ORAL EVERY 6 HOURS PRN
Qty: 12 TABLET | Refills: 0 | Status: SHIPPED | OUTPATIENT
Start: 2022-12-18 | End: 2022-12-21

## 2022-12-18 RX ORDER — DOXYCYCLINE HYCLATE 100 MG/1
100 CAPSULE ORAL ONCE
Status: COMPLETED | OUTPATIENT
Start: 2022-12-18 | End: 2022-12-18

## 2022-12-18 RX ORDER — OXYCODONE HYDROCHLORIDE AND ACETAMINOPHEN 5; 325 MG/1; MG/1
1 TABLET ORAL ONCE
Status: COMPLETED | OUTPATIENT
Start: 2022-12-18 | End: 2022-12-18

## 2022-12-18 RX ORDER — DOXYCYCLINE HYCLATE 100 MG
100 TABLET ORAL 2 TIMES DAILY
Qty: 20 TABLET | Refills: 0 | Status: SHIPPED | OUTPATIENT
Start: 2022-12-18 | End: 2022-12-28

## 2022-12-18 RX ORDER — CEFDINIR 300 MG/1
300 CAPSULE ORAL 2 TIMES DAILY
Qty: 20 CAPSULE | Refills: 0 | Status: SHIPPED | OUTPATIENT
Start: 2022-12-18 | End: 2022-12-28

## 2022-12-18 RX ADMIN — CEFDINIR 300 MG: 300 CAPSULE ORAL at 00:51

## 2022-12-18 RX ADMIN — DOXYCYCLINE 100 MG: 100 CAPSULE ORAL at 00:52

## 2022-12-18 RX ADMIN — OXYCODONE AND ACETAMINOPHEN 1 TABLET: 5; 325 TABLET ORAL at 00:51

## 2022-12-18 ASSESSMENT — ENCOUNTER SYMPTOMS
NAUSEA: 0
COUGH: 0
PHOTOPHOBIA: 0
SHORTNESS OF BREATH: 0
ABDOMINAL DISTENTION: 0
CHEST TIGHTNESS: 0
DIARRHEA: 0
VOMITING: 0
ABDOMINAL PAIN: 0

## 2022-12-18 ASSESSMENT — PAIN SCALES - GENERAL
PAINLEVEL_OUTOF10: 7
PAINLEVEL_OUTOF10: 7

## 2022-12-18 NOTE — ED PROVIDER NOTES
Jason Tuttle is a 65year old female with PMH of CVA who presented to ED with concern for dysuria and right-sided flank pain. Patient began to have a stop began to have a stabbing throbbing pain to the right flank and suprapubic pain. Patient is also having symptoms of dysuria. Patient denies fever, chills, nausea, vomiting. Nothing make symptoms better or worse symptoms are moderate in severity and constant patient denies any history of kidney stone. Patient was having a burning sensation with urination and did take Pyridium. Patient denies any midline lower back pain or weakness. Patient denies any saddle anesthesia paresthesia. Patient denies urinary incontinence or retention. Patient denies trauma    The history is provided by the patient and medical records. Review of Systems   Constitutional:  Negative for chills, diaphoresis, fatigue and fever. Eyes:  Negative for photophobia and visual disturbance. Respiratory:  Negative for cough, chest tightness and shortness of breath. Cardiovascular:  Negative for chest pain, palpitations and leg swelling. Gastrointestinal:  Negative for abdominal distention, abdominal pain, diarrhea, nausea and vomiting. Genitourinary:  Positive for dysuria and flank pain. Musculoskeletal:  Negative for neck pain and neck stiffness. Skin:  Negative for pallor and rash. Neurological:  Negative for headaches. Psychiatric/Behavioral:  Negative for confusion. Physical Exam  Vitals and nursing note reviewed. Constitutional:       General: She is in acute distress. Appearance: She is not ill-appearing. HENT:      Head: Normocephalic and atraumatic. Eyes:      General: No scleral icterus. Conjunctiva/sclera: Conjunctivae normal.      Pupils: Pupils are equal, round, and reactive to light. Cardiovascular:      Rate and Rhythm: Normal rate and regular rhythm.    Pulmonary:      Effort: Pulmonary effort is normal.      Breath sounds: Normal breath sounds. Abdominal:      General: Bowel sounds are normal. There is no distension. Palpations: Abdomen is soft. Tenderness: There is abdominal tenderness. There is right CVA tenderness. There is no guarding or rebound. Musculoskeletal:      Cervical back: Normal range of motion and neck supple. No rigidity or tenderness. No muscular tenderness. Right lower leg: No edema. Left lower leg: No edema. Skin:     General: Skin is warm and dry. Capillary Refill: Capillary refill takes less than 2 seconds. Coloration: Skin is not pale. Findings: No erythema or rash. Neurological:      Mental Status: She is alert and oriented to person, place, and time. Psychiatric:         Mood and Affect: Mood normal.        Procedures     MDM  Number of Diagnoses or Management Options  Ground glass opacity present on imaging of lung  Pyelonephritis  Diagnosis management comments: Gamaliel Eid is a 50-year-old female present emergency department concern for flank pain. Patient was not found to have obstructing kidney stone. Patient was found to have a likely urinary tract infection based on patient's symptoms of dysuria UA was difficult interpret as patient did take Pyridium urine culture is pending patient was started on Omnicef for possible pyelonephritis.   Patient was also given doxycycline for findings on CT scan of groundglass opacities patient did report she intermittently does have a nonproductive cough patient denies any shortness of breath or chest pain chest x-ray was unremarkable COVID and flu were negative patient was advised to return to emergency department she has any worsening of symptoms or symptoms do not improve         ED Course as of 12/18/22 0416   Sun Dec 18, 2022   0045 Tachycardia resolved repeat evaluation patient did have some return of the flank pain [SS]   0046 Patient does not have any red flags concerning for cord compression or cauda equina ptient afebrile [SS]      ED Course User Index  [SS] Park Mendez MD        ED Course as of 22 0433   Sun Dec 18, 2022   0045 Tachycardia resolved repeat evaluation patient did have some return of the flank pain [SS]   0046 Patient does not have any red flags concerning for cord compression or cauda equina ptient afebrile [SS]      ED Course User Index  [SS] Park Mendez MD       --------------------------------------------- PAST HISTORY ---------------------------------------------  Past Medical History:  has a past medical history of Asthma, CAD (coronary artery disease), CVA (cerebral vascular accident) ([facility] [address] Environmental allergies, Fracture, Heart palpitations, Hypertension, Kidney infection, MI (myocardial infarction) ([facility] [address] and Syncope and collapse. Past Surgical History:  has a past surgical history that includes Ankle surgery (Right, ); Leg Tendon Surgery;  section; Endometrial ablation (); Finger surgery (Right, 2017); and IR MECHANICAL ART THROMBECTOMY INTRACRANIAL (10/27/2022). Social History:  reports that she quit smoking about 11 years ago. Her smoking use included cigarettes. She has a 12.00 pack-year smoking history. She has never used smokeless tobacco. She reports that she does not currently use alcohol after a past usage of about 42.0 standard drinks per week. She reports that she does not use drugs. Family History: family history includes Asthma in her father and mother; Diabetes in her father; High Blood Pressure in her father. The patients home medications have been reviewed.     Allergies: Tape [adhesive tape] and Pcn [penicillins]    -------------------------------------------------- RESULTS -------------------------------------------------  Labs:  Results for orders placed or performed during the hospital encounter of 22   COVID-19 & Influenza Combo    Specimen: Nasopharyngeal Swab   Result Value Ref Range    SARS-CoV-2 RNA, RT PCR NOT DETECTED NOT DETECTED    INFLUENZA A NOT DETECTED NOT DETECTED    INFLUENZA B NOT DETECTED NOT DETECTED   Urinalysis with Microscopic   Result Value Ref Range    Color, UA ORANGE (A) Straw/Yellow    Clarity, UA Clear Clear    Glucose, Ur Negative Negative mg/dL    Bilirubin Urine Negative Negative    Ketones, Urine Negative Negative mg/dL    Specific Gravity, UA 1.010 1.005 - 1.030    Blood, Urine Negative Negative    pH, UA 5.5 5.0 - 9.0    Protein, UA Negative Negative mg/dL    Urobilinogen, Urine 1.0 <2.0 E.U./dL    Nitrite, Urine POSITIVE (A) Negative    Leukocyte Esterase, Urine TRACE (A) Negative    WBC, UA 0-1 0 - 5 /HPF    RBC, UA NONE 0 - 2 /HPF    Epithelial Cells, UA RARE /HPF    Bacteria, UA RARE (A) None Seen /HPF   CBC with Auto Differential   Result Value Ref Range    WBC 5.4 4.5 - 11.5 E9/L    RBC 3.80 3.50 - 5.50 E12/L    Hemoglobin 9.2 (L) 11.5 - 15.5 g/dL    Hematocrit 28.1 (L) 34.0 - 48.0 %    MCV 73.9 (L) 80.0 - 99.9 fL    MCH 24.2 (L) 26.0 - 35.0 pg    MCHC 32.7 32.0 - 34.5 %    RDW 15.3 (H) 11.5 - 15.0 fL    Platelets 141 (H) 704 - 450 E9/L    MPV 7.8 7.0 - 12.0 fL    Neutrophils % 49.8 43.0 - 80.0 %    Immature Granulocytes % 0.6 0.0 - 5.0 %    Lymphocytes % 30.2 20.0 - 42.0 %    Monocytes % 16.1 (H) 2.0 - 12.0 %    Eosinophils % 2.2 0.0 - 6.0 %    Basophils % 1.1 0.0 - 2.0 %    Neutrophils Absolute 2.68 1.80 - 7.30 E9/L    Immature Granulocytes # 0.03 E9/L    Lymphocytes Absolute 1.63 1.50 - 4.00 E9/L    Monocytes Absolute 0.87 0.10 - 0.95 E9/L    Eosinophils Absolute 0.12 0.05 - 0.50 E9/L    Basophils Absolute 0.06 0.00 - 0.20 E9/L   Comprehensive Metabolic Panel w/ Reflex to MG   Result Value Ref Range    Sodium 132 132 - 146 mmol/L    Potassium reflex Magnesium 3.6 3.5 - 5.0 mmol/L    Chloride 93 (L) 98 - 107 mmol/L    CO2 25 22 - 29 mmol/L    Anion Gap 14 7 - 16 mmol/L    Glucose 123 (H) 74 - 99 mg/dL    BUN 15 6 - 20 mg/dL    Creatinine 1.0 0.5 - 1.0 mg/dL    Est, Glom Filt Rate >60 >=60 mL/min/1.73    Calcium 9.7 8.6 - 10.2 mg/dL    Total Protein 7.5 6.4 - 8.3 g/dL    Albumin 4.7 3.5 - 5.2 g/dL    Total Bilirubin <0.2 0.0 - 1.2 mg/dL    Alkaline Phosphatase 143 (H) 35 - 104 U/L    ALT 23 0 - 32 U/L    AST 28 0 - 31 U/L   Troponin   Result Value Ref Range    Troponin, High Sensitivity 13 (H) 0 - 9 ng/L   Lactic Acid   Result Value Ref Range    Lactic Acid 1.0 0.5 - 2.2 mmol/L   Lipase   Result Value Ref Range    Lipase 29 13 - 60 U/L   Troponin   Result Value Ref Range    Troponin, High Sensitivity 11 (H) 0 - 9 ng/L       Radiology:  XR CHEST PORTABLE   Final Result   No acute disease. RECOMMENDATION:   Careful clinical correlation and follow up recommended. CT ABDOMEN PELVIS WO CONTRAST Additional Contrast? None   Final Result   1. There are ground-glass opacities at the lung bases concerning for and   underlying infectious or inflammatory process an atypical pneumonia or viral   airway disease could give could give this appearance. 2. No signs of urolithiasis or obstructive uropathy. 3. Nonspecific bowel gas pattern             ------------------------- NURSING NOTES AND VITALS REVIEWED ---------------------------  Date / Time Roomed:  12/17/2022 10:17 PM  ED Bed Assignment:  11/11    The nursing notes within the ED encounter and vital signs as below have been reviewed. /84   Pulse 88   Temp 98.2 °F (36.8 °C) (Oral)   Resp 16   Wt 196 lb (88.9 kg)   SpO2 99%   BMI 29.80 kg/m²   Oxygen Saturation Interpretation: Normal      ------------------------------------------ PROGRESS NOTES ------------------------------------------  4:33 AM EST  I have spoken with the patient and discussed todays results, in addition to providing specific details for the plan of care and counseling regarding the diagnosis and prognosis. Their questions are answered at this time and they are agreeable with the plan.  I discussed at length with them reasons for immediate return here for re evaluation. They will followup with their primary care physician by calling their office tomorrow. --------------------------------- ADDITIONAL PROVIDER NOTES ---------------------------------  At this time the patient is without objective evidence of an acute process requiring hospitalization or inpatient management. They have remained hemodynamically stable throughout their entire ED visit and are stable for discharge with outpatient follow-up. The plan has been discussed in detail and they are aware of the specific conditions for emergent return, as well as the importance of follow-up. Discharge Medication List as of 12/18/2022 12:45 AM        START taking these medications    Details   oxyCODONE-acetaminophen (PERCOCET) 5-325 MG per tablet Take 1 tablet by mouth every 6 hours as needed for Pain for up to 3 days. Intended supply: 3 days. Take lowest dose possible to manage pain, Disp-12 tablet, R-0Normal      cefdinir (OMNICEF) 300 MG capsule Take 1 capsule by mouth 2 times daily for 10 days, Disp-20 capsule, R-0Normal      doxycycline hyclate (VIBRA-TABS) 100 MG tablet Take 1 tablet by mouth 2 times daily for 10 days, Disp-20 tablet, R-0Normal             Diagnosis:  1. Pyelonephritis    2. Ground glass opacity present on imaging of lung        Disposition:  Patient's disposition: Discharge to home  Patient's condition is stable.        Teodora Austin MD  12/18/22 2828

## 2022-12-18 NOTE — ED NOTES
Discharge instructions and prescriptions given. Patient states verbal understanding. Ambulatory to exit.        Luis Clement RN  12/18/22 1294

## 2022-12-20 LAB — URINE CULTURE, ROUTINE: NORMAL

## 2023-01-17 ENCOUNTER — HOSPITAL ENCOUNTER (OUTPATIENT)
Dept: CT IMAGING | Age: 58
Discharge: HOME | DRG: 062 | End: 2023-01-19
Payer: MEDICARE

## 2023-01-17 ENCOUNTER — HOSPITAL ENCOUNTER (INPATIENT)
Dept: INTERVENTIONAL RADIOLOGY/VASCULAR | Age: 58
LOS: 3 days | Discharge: HOME | DRG: 062 | End: 2023-01-20
Attending: PSYCHIATRY & NEUROLOGY | Admitting: PSYCHIATRY & NEUROLOGY
Payer: MEDICARE

## 2023-01-17 DIAGNOSIS — R29.90 STROKE-LIKE EPISODE: Primary | ICD-10-CM

## 2023-01-17 DIAGNOSIS — I63.9 ACUTE CEREBROVASCULAR ACCIDENT (CVA) (HCC): ICD-10-CM

## 2023-01-17 DIAGNOSIS — R20.0 NUMBNESS: ICD-10-CM

## 2023-01-17 DIAGNOSIS — I63.239: ICD-10-CM

## 2023-01-17 DIAGNOSIS — I63.511 ACUTE CEREBROVASCULAR ACCIDENT (CVA) DUE TO OCCLUSION OF RIGHT MIDDLE CEREBRAL ARTERY (HCC): ICD-10-CM

## 2023-01-17 LAB
ANION GAP SERPL CALCULATED.3IONS-SCNC: 12 MMOL/L (ref 7–16)
BASOPHILS ABSOLUTE: 0.05 E9/L (ref 0–0.2)
BASOPHILS RELATIVE PERCENT: 1.1 % (ref 0–2)
BUN BLDV-MCNC: 17 MG/DL (ref 6–20)
CALCIUM SERPL-MCNC: 9.4 MG/DL (ref 8.6–10.2)
CHLORIDE BLD-SCNC: 97 MMOL/L (ref 98–107)
CO2: 25 MMOL/L (ref 22–29)
CREAT SERPL-MCNC: 1 MG/DL (ref 0.5–1)
EOSINOPHILS ABSOLUTE: 0.19 E9/L (ref 0.05–0.5)
EOSINOPHILS RELATIVE PERCENT: 4.4 % (ref 0–6)
GFR SERPL CREATININE-BSD FRML MDRD: >60 ML/MIN/1.73
GLUCOSE BLD-MCNC: 92 MG/DL (ref 74–99)
HCT VFR BLD CALC: 28.5 % (ref 34–48)
HEMOGLOBIN: 8.6 G/DL (ref 11.5–15.5)
IMMATURE GRANULOCYTES #: 0.01 E9/L
IMMATURE GRANULOCYTES %: 0.2 % (ref 0–5)
INR BLD: 1
LYMPHOCYTES ABSOLUTE: 1.36 E9/L (ref 1.5–4)
LYMPHOCYTES RELATIVE PERCENT: 31.3 % (ref 20–42)
MCH RBC QN AUTO: 22.1 PG (ref 26–35)
MCHC RBC AUTO-ENTMCNC: 30.2 % (ref 32–34.5)
MCV RBC AUTO: 73.3 FL (ref 80–99.9)
MONOCYTES ABSOLUTE: 0.58 E9/L (ref 0.1–0.95)
MONOCYTES RELATIVE PERCENT: 13.3 % (ref 2–12)
NEUTROPHILS ABSOLUTE: 2.16 E9/L (ref 1.8–7.3)
NEUTROPHILS RELATIVE PERCENT: 49.7 % (ref 43–80)
PDW BLD-RTO: 15.9 FL (ref 11.5–15)
PLATELET # BLD: 432 E9/L (ref 130–450)
PMV BLD AUTO: 8.4 FL (ref 7–12)
POTASSIUM SERPL-SCNC: 3.7 MMOL/L (ref 3.5–5)
PROTHROMBIN TIME: 11.2 SEC (ref 9.3–12.4)
RBC # BLD: 3.89 E12/L (ref 3.5–5.5)
SODIUM BLD-SCNC: 134 MMOL/L (ref 132–146)
WBC # BLD: 4.4 E9/L (ref 4.5–11.5)

## 2023-01-17 PROCEDURE — 4A03X5D MEASUREMENT OF ARTERIAL FLOW, INTRACRANIAL, EXTERNAL APPROACH: ICD-10-PCS | Performed by: PSYCHIATRY & NEUROLOGY

## 2023-01-17 PROCEDURE — 7100000011 HC PHASE II RECOVERY - ADDTL 15 MIN

## 2023-01-17 PROCEDURE — 3E03317 INTRODUCTION OF OTHER THROMBOLYTIC INTO PERIPHERAL VEIN, PERCUTANEOUS APPROACH: ICD-10-PCS | Performed by: PSYCHIATRY & NEUROLOGY

## 2023-01-17 PROCEDURE — 70450 CT HEAD/BRAIN W/O DYE: CPT

## 2023-01-17 PROCEDURE — 7100000000 HC PACU RECOVERY - FIRST 15 MIN

## 2023-01-17 PROCEDURE — 7100000001 HC PACU RECOVERY - ADDTL 15 MIN

## 2023-01-17 PROCEDURE — 2709999900 IR SEL CATH PLACE COM CAROTID INTRACRANIAL LEFT W OR WO ANGIO

## 2023-01-17 PROCEDURE — 2500000003 HC RX 250 WO HCPCS: Performed by: PSYCHIATRY & NEUROLOGY

## 2023-01-17 PROCEDURE — 80048 BASIC METABOLIC PNL TOTAL CA: CPT

## 2023-01-17 PROCEDURE — 36223 PLACE CATH CAROTID/INOM ART: CPT

## 2023-01-17 PROCEDURE — 6360000004 HC RX CONTRAST MEDICATION: Performed by: PSYCHIATRY & NEUROLOGY

## 2023-01-17 PROCEDURE — 36415 COLL VENOUS BLD VENIPUNCTURE: CPT

## 2023-01-17 PROCEDURE — 6360000002 HC RX W HCPCS: Performed by: PSYCHIATRY & NEUROLOGY

## 2023-01-17 PROCEDURE — 6370000000 HC RX 637 (ALT 250 FOR IP): Performed by: PSYCHIATRY & NEUROLOGY

## 2023-01-17 PROCEDURE — 85025 COMPLETE CBC W/AUTO DIFF WBC: CPT

## 2023-01-17 PROCEDURE — 6360000002 HC RX W HCPCS: Performed by: CLINICAL NURSE SPECIALIST

## 2023-01-17 PROCEDURE — 36226 PLACE CATH VERTEBRAL ART: CPT | Performed by: PSYCHIATRY & NEUROLOGY

## 2023-01-17 PROCEDURE — 2000000000 HC ICU R&B

## 2023-01-17 PROCEDURE — 85610 PROTHROMBIN TIME: CPT

## 2023-01-17 PROCEDURE — 36226 PLACE CATH VERTEBRAL ART: CPT

## 2023-01-17 PROCEDURE — 36224 PLACE CATH CAROTD ART: CPT | Performed by: PSYCHIATRY & NEUROLOGY

## 2023-01-17 PROCEDURE — 7100000010 HC PHASE II RECOVERY - FIRST 15 MIN

## 2023-01-17 PROCEDURE — G0379 DIRECT REFER HOSPITAL OBSERV: HCPCS

## 2023-01-17 PROCEDURE — 6360000002 HC RX W HCPCS

## 2023-01-17 PROCEDURE — 2580000003 HC RX 258: Performed by: PSYCHIATRY & NEUROLOGY

## 2023-01-17 PROCEDURE — G0378 HOSPITAL OBSERVATION PER HR: HCPCS

## 2023-01-17 RX ORDER — OMEPRAZOLE 40 MG/1
CAPSULE, DELAYED RELEASE ORAL
COMMUNITY
Start: 2023-01-01 | End: 2023-01-17

## 2023-01-17 RX ORDER — SODIUM CHLORIDE 0.9 % (FLUSH) 0.9 %
5-40 SYRINGE (ML) INJECTION PRN
Status: DISCONTINUED | OUTPATIENT
Start: 2023-01-17 | End: 2023-01-20 | Stop reason: HOSPADM

## 2023-01-17 RX ORDER — CLINDAMYCIN HYDROCHLORIDE 150 MG/1
CAPSULE ORAL
Status: ON HOLD | COMMUNITY
Start: 2023-01-05 | End: 2023-01-20 | Stop reason: HOSPADM

## 2023-01-17 RX ORDER — TRAMADOL HYDROCHLORIDE 50 MG/1
50 TABLET ORAL EVERY 4 HOURS PRN
Status: DISCONTINUED | OUTPATIENT
Start: 2023-01-17 | End: 2023-01-20 | Stop reason: HOSPADM

## 2023-01-17 RX ORDER — STANDARDIZED SENNA CONCENTRATE 8.6 MG/1
TABLET ORAL
Status: ON HOLD | COMMUNITY
Start: 2023-01-02 | End: 2023-01-20 | Stop reason: HOSPADM

## 2023-01-17 RX ORDER — HYDRALAZINE HYDROCHLORIDE 20 MG/ML
10 INJECTION INTRAMUSCULAR; INTRAVENOUS EVERY 30 MIN PRN
Status: DISCONTINUED | OUTPATIENT
Start: 2023-01-17 | End: 2023-01-17

## 2023-01-17 RX ORDER — SODIUM CHLORIDE 9 MG/ML
INJECTION, SOLUTION INTRAVENOUS PRN
Status: DISCONTINUED | OUTPATIENT
Start: 2023-01-17 | End: 2023-01-20 | Stop reason: HOSPADM

## 2023-01-17 RX ORDER — ATORVASTATIN CALCIUM 40 MG/1
80 TABLET, FILM COATED ORAL NIGHTLY
Status: DISCONTINUED | OUTPATIENT
Start: 2023-01-17 | End: 2023-01-20 | Stop reason: HOSPADM

## 2023-01-17 RX ORDER — MIDAZOLAM HYDROCHLORIDE 2 MG/2ML
INJECTION, SOLUTION INTRAMUSCULAR; INTRAVENOUS
Status: COMPLETED | OUTPATIENT
Start: 2023-01-17 | End: 2023-01-17

## 2023-01-17 RX ORDER — MIDAZOLAM HYDROCHLORIDE 1 MG/ML
INJECTION INTRAMUSCULAR; INTRAVENOUS
Status: COMPLETED
Start: 2023-01-17 | End: 2023-01-17

## 2023-01-17 RX ORDER — DEXTROSE MONOHYDRATE 25 G/50ML
12.5 INJECTION, SOLUTION INTRAVENOUS
Status: ACTIVE | OUTPATIENT
Start: 2023-01-17 | End: 2023-01-18

## 2023-01-17 RX ORDER — CYCLOBENZAPRINE HCL 10 MG
5 TABLET ORAL 2 TIMES DAILY PRN
Status: DISCONTINUED | OUTPATIENT
Start: 2023-01-17 | End: 2023-01-20 | Stop reason: HOSPADM

## 2023-01-17 RX ORDER — ONDANSETRON 4 MG/1
4 TABLET, ORALLY DISINTEGRATING ORAL EVERY 8 HOURS PRN
Status: DISCONTINUED | OUTPATIENT
Start: 2023-01-17 | End: 2023-01-20 | Stop reason: HOSPADM

## 2023-01-17 RX ORDER — MIDAZOLAM HYDROCHLORIDE 2 MG/2ML
2 INJECTION, SOLUTION INTRAMUSCULAR; INTRAVENOUS EVERY 4 HOURS PRN
Status: DISCONTINUED | OUTPATIENT
Start: 2023-01-17 | End: 2023-01-19

## 2023-01-17 RX ORDER — SODIUM CHLORIDE 0.9 % (FLUSH) 0.9 %
5-40 SYRINGE (ML) INJECTION EVERY 12 HOURS SCHEDULED
Status: DISCONTINUED | OUTPATIENT
Start: 2023-01-17 | End: 2023-01-20 | Stop reason: HOSPADM

## 2023-01-17 RX ORDER — HEPARIN SODIUM 10000 [USP'U]/ML
INJECTION, SOLUTION INTRAVENOUS; SUBCUTANEOUS
Status: COMPLETED | OUTPATIENT
Start: 2023-01-17 | End: 2023-01-17

## 2023-01-17 RX ORDER — HYDROCHLOROTHIAZIDE 25 MG/1
12.5 TABLET ORAL DAILY
Status: DISCONTINUED | OUTPATIENT
Start: 2023-01-18 | End: 2023-01-20 | Stop reason: HOSPADM

## 2023-01-17 RX ORDER — LOSARTAN POTASSIUM AND HYDROCHLOROTHIAZIDE 12.5; 5 MG/1; MG/1
1 TABLET ORAL DAILY
Status: DISCONTINUED | OUTPATIENT
Start: 2023-01-18 | End: 2023-01-17 | Stop reason: CLARIF

## 2023-01-17 RX ORDER — LORAZEPAM 2 MG/ML
2 INJECTION INTRAMUSCULAR EVERY 4 HOURS PRN
Status: DISCONTINUED | OUTPATIENT
Start: 2023-01-17 | End: 2023-01-17

## 2023-01-17 RX ORDER — SODIUM CHLORIDE 0.9 % (FLUSH) 0.9 %
10 SYRINGE (ML) INJECTION ONCE
Status: COMPLETED | OUTPATIENT
Start: 2023-01-17 | End: 2023-01-17

## 2023-01-17 RX ORDER — FOLIC ACID 1 MG/1
1 TABLET ORAL DAILY
Status: DISCONTINUED | OUTPATIENT
Start: 2023-01-17 | End: 2023-01-20 | Stop reason: HOSPADM

## 2023-01-17 RX ORDER — CLOPIDOGREL BISULFATE 75 MG/1
75 TABLET ORAL DAILY
Status: DISCONTINUED | OUTPATIENT
Start: 2023-01-17 | End: 2023-01-20 | Stop reason: HOSPADM

## 2023-01-17 RX ORDER — DIPHENHYDRAMINE HCL 50 MG/1
CAPSULE ORAL
Status: ON HOLD | COMMUNITY
Start: 2022-12-26 | End: 2023-01-20 | Stop reason: HOSPADM

## 2023-01-17 RX ORDER — ASPIRIN 81 MG/1
81 TABLET, CHEWABLE ORAL DAILY
Status: DISCONTINUED | OUTPATIENT
Start: 2023-01-17 | End: 2023-01-19

## 2023-01-17 RX ORDER — LIDOCAINE HYDROCHLORIDE 20 MG/ML
INJECTION, SOLUTION EPIDURAL; INFILTRATION; INTRACAUDAL; PERINEURAL
Status: COMPLETED | OUTPATIENT
Start: 2023-01-17 | End: 2023-01-17

## 2023-01-17 RX ORDER — ONDANSETRON 2 MG/ML
4 INJECTION INTRAMUSCULAR; INTRAVENOUS EVERY 6 HOURS PRN
Status: DISCONTINUED | OUTPATIENT
Start: 2023-01-17 | End: 2023-01-20 | Stop reason: HOSPADM

## 2023-01-17 RX ORDER — ALBUTEROL SULFATE 2.5 MG/3ML
2.5 SOLUTION RESPIRATORY (INHALATION) EVERY 6 HOURS PRN
Status: DISCONTINUED | OUTPATIENT
Start: 2023-01-17 | End: 2023-01-20 | Stop reason: HOSPADM

## 2023-01-17 RX ORDER — HYDRALAZINE HYDROCHLORIDE 20 MG/ML
10 INJECTION INTRAMUSCULAR; INTRAVENOUS
Status: DISCONTINUED | OUTPATIENT
Start: 2023-01-17 | End: 2023-01-20 | Stop reason: HOSPADM

## 2023-01-17 RX ORDER — LOSARTAN POTASSIUM 50 MG/1
50 TABLET ORAL DAILY
Status: DISCONTINUED | OUTPATIENT
Start: 2023-01-18 | End: 2023-01-20 | Stop reason: HOSPADM

## 2023-01-17 RX ORDER — NICOTINE 21 MG/24HR
1 PATCH, TRANSDERMAL 24 HOURS TRANSDERMAL DAILY
Status: DISCONTINUED | OUTPATIENT
Start: 2023-01-17 | End: 2023-01-17

## 2023-01-17 RX ORDER — LABETALOL HYDROCHLORIDE 5 MG/ML
10 INJECTION, SOLUTION INTRAVENOUS EVERY 10 MIN PRN
Status: DISCONTINUED | OUTPATIENT
Start: 2023-01-17 | End: 2023-01-20 | Stop reason: HOSPADM

## 2023-01-17 RX ORDER — KETOCONAZOLE 20 MG/ML
SHAMPOO TOPICAL
COMMUNITY
Start: 2022-12-12

## 2023-01-17 RX ORDER — TRAMADOL HYDROCHLORIDE 50 MG/1
TABLET ORAL
COMMUNITY
Start: 2022-12-29

## 2023-01-17 RX ORDER — LOSARTAN POTASSIUM AND HYDROCHLOROTHIAZIDE 12.5; 5 MG/1; MG/1
TABLET ORAL
COMMUNITY
Start: 2023-01-04

## 2023-01-17 RX ORDER — LABETALOL HYDROCHLORIDE 5 MG/ML
10 INJECTION, SOLUTION INTRAVENOUS ONCE
Status: COMPLETED | OUTPATIENT
Start: 2023-01-17 | End: 2023-01-17

## 2023-01-17 RX ORDER — AMLODIPINE BESYLATE 5 MG/1
5 TABLET ORAL DAILY
Status: DISCONTINUED | OUTPATIENT
Start: 2023-01-18 | End: 2023-01-20 | Stop reason: HOSPADM

## 2023-01-17 RX ORDER — LANOLIN ALCOHOL/MO/W.PET/CERES
100 CREAM (GRAM) TOPICAL DAILY
Status: DISCONTINUED | OUTPATIENT
Start: 2023-01-17 | End: 2023-01-20 | Stop reason: HOSPADM

## 2023-01-17 RX ORDER — BACLOFEN 10 MG/1
5 TABLET ORAL 3 TIMES DAILY PRN
Status: DISCONTINUED | OUTPATIENT
Start: 2023-01-17 | End: 2023-01-19

## 2023-01-17 RX ORDER — FENTANYL CITRATE 50 UG/ML
INJECTION, SOLUTION INTRAMUSCULAR; INTRAVENOUS
Status: COMPLETED | OUTPATIENT
Start: 2023-01-17 | End: 2023-01-17

## 2023-01-17 RX ORDER — ACETAMINOPHEN 500 MG
500 TABLET ORAL ONCE
Status: COMPLETED | OUTPATIENT
Start: 2023-01-17 | End: 2023-01-17

## 2023-01-17 RX ORDER — NITROFURANTOIN 25; 75 MG/1; MG/1
CAPSULE ORAL
Status: ON HOLD | COMMUNITY
Start: 2022-12-28 | End: 2023-01-20 | Stop reason: HOSPADM

## 2023-01-17 RX ORDER — ACETAMINOPHEN 325 MG/1
650 TABLET ORAL EVERY 4 HOURS PRN
Status: DISCONTINUED | OUTPATIENT
Start: 2023-01-17 | End: 2023-01-20 | Stop reason: HOSPADM

## 2023-01-17 RX ORDER — MIDAZOLAM HYDROCHLORIDE 2 MG/2ML
2 INJECTION, SOLUTION INTRAMUSCULAR; INTRAVENOUS ONCE
Status: COMPLETED | OUTPATIENT
Start: 2023-01-17 | End: 2023-01-17

## 2023-01-17 RX ADMIN — ACETAMINOPHEN 500 MG: 500 TABLET ORAL at 11:56

## 2023-01-17 RX ADMIN — FENTANYL CITRATE 50 MCG: 50 INJECTION, SOLUTION INTRAMUSCULAR; INTRAVENOUS at 10:25

## 2023-01-17 RX ADMIN — FOLIC ACID 1 MG: 1 TABLET ORAL at 15:30

## 2023-01-17 RX ADMIN — LIDOCAINE HYDROCHLORIDE 4 ML: 20 INJECTION, SOLUTION EPIDURAL; INFILTRATION; INTRACAUDAL; PERINEURAL at 10:32

## 2023-01-17 RX ADMIN — MIDAZOLAM HYDROCHLORIDE 1 MG: 1 INJECTION, SOLUTION INTRAMUSCULAR; INTRAVENOUS at 10:25

## 2023-01-17 RX ADMIN — LORAZEPAM 2 MG: 2 INJECTION INTRAMUSCULAR; INTRAVENOUS at 15:14

## 2023-01-17 RX ADMIN — MIDAZOLAM 2 MG: 1 INJECTION INTRAMUSCULAR; INTRAVENOUS at 15:56

## 2023-01-17 RX ADMIN — SODIUM CHLORIDE, PRESERVATIVE FREE 10 ML: 5 INJECTION INTRAVENOUS at 15:30

## 2023-01-17 RX ADMIN — HYDRALAZINE HYDROCHLORIDE 10 MG: 20 INJECTION INTRAMUSCULAR; INTRAVENOUS at 15:50

## 2023-01-17 RX ADMIN — SODIUM CHLORIDE, PRESERVATIVE FREE 10 ML: 5 INJECTION INTRAVENOUS at 20:22

## 2023-01-17 RX ADMIN — TRAMADOL HYDROCHLORIDE 50 MG: 50 TABLET ORAL at 20:13

## 2023-01-17 RX ADMIN — DICLOFENAC SODIUM 2 G: 10 GEL TOPICAL at 21:13

## 2023-01-17 RX ADMIN — Medication 5000 UNITS: at 10:32

## 2023-01-17 RX ADMIN — LABETALOL HYDROCHLORIDE 10 MG: 5 INJECTION INTRAVENOUS at 14:44

## 2023-01-17 RX ADMIN — BACLOFEN 5 MG: 10 TABLET ORAL at 20:13

## 2023-01-17 RX ADMIN — MIDAZOLAM 2 MG: 1 INJECTION INTRAMUSCULAR; INTRAVENOUS at 20:14

## 2023-01-17 RX ADMIN — CYCLOBENZAPRINE 5 MG: 10 TABLET, FILM COATED ORAL at 15:33

## 2023-01-17 RX ADMIN — Medication 100 MG: at 15:30

## 2023-01-17 RX ADMIN — TRAMADOL HYDROCHLORIDE 50 MG: 50 TABLET ORAL at 15:33

## 2023-01-17 RX ADMIN — IOPAMIDOL 50 ML: 612 INJECTION, SOLUTION INTRAVENOUS at 10:45

## 2023-01-17 RX ADMIN — ATORVASTATIN CALCIUM 80 MG: 40 TABLET, FILM COATED ORAL at 20:13

## 2023-01-17 RX ADMIN — MIDAZOLAM HYDROCHLORIDE 2 MG: 2 INJECTION, SOLUTION INTRAMUSCULAR; INTRAVENOUS at 15:56

## 2023-01-17 RX ADMIN — MIDAZOLAM 2 MG: 1 INJECTION INTRAMUSCULAR; INTRAVENOUS at 16:00

## 2023-01-17 RX ADMIN — SODIUM CHLORIDE, PRESERVATIVE FREE 10 ML: 5 INJECTION INTRAVENOUS at 14:26

## 2023-01-17 RX ADMIN — Medication 1000 ML: at 10:31

## 2023-01-17 RX ADMIN — Medication 1000 ML: at 10:32

## 2023-01-17 RX ADMIN — Medication 22 MG: at 14:20

## 2023-01-17 ASSESSMENT — PAIN DESCRIPTION - ONSET: ONSET: ON-GOING

## 2023-01-17 ASSESSMENT — PAIN DESCRIPTION - DESCRIPTORS: DESCRIPTORS: STABBING;SHOOTING;DISCOMFORT

## 2023-01-17 ASSESSMENT — PAIN SCALES - GENERAL
PAINLEVEL_OUTOF10: 8
PAINLEVEL_OUTOF10: 8
PAINLEVEL_OUTOF10: 4
PAINLEVEL_OUTOF10: 7
PAINLEVEL_OUTOF10: 8
PAINLEVEL_OUTOF10: 8

## 2023-01-17 ASSESSMENT — PAIN DESCRIPTION - ORIENTATION
ORIENTATION: LEFT;RIGHT
ORIENTATION: LEFT;RIGHT
ORIENTATION: RIGHT

## 2023-01-17 ASSESSMENT — PAIN DESCRIPTION - LOCATION
LOCATION: LEG;ARM
LOCATION: ARM;LEG
LOCATION: ARM

## 2023-01-17 ASSESSMENT — PAIN DESCRIPTION - PAIN TYPE: TYPE: ACUTE PAIN;CHRONIC PAIN

## 2023-01-17 ASSESSMENT — PAIN - FUNCTIONAL ASSESSMENT: PAIN_FUNCTIONAL_ASSESSMENT: PREVENTS OR INTERFERES SOME ACTIVE ACTIVITIES AND ADLS

## 2023-01-17 ASSESSMENT — PAIN DESCRIPTION - FREQUENCY: FREQUENCY: CONTINUOUS

## 2023-01-17 NOTE — PROGRESS NOTES
Pt anxious and restless. Will not sit still and screams \"take this off of me it hurts so bad\" when cuff pressures are obtained. Will continue to monitor pressures as able.

## 2023-01-17 NOTE — PROGRESS NOTES
NIH Stroke Scale/Score at time of initial evaluation:    1A: Level of Consciousness 0 - alert; keenly responsive   1B: Ask Month and Age 0 - answers both questions correctly   1C:  Tell Patient To Open and Close Eyes, then Hand  Squeeze 0 - performs both tasks correctly   2: Test Horizontal Extraocular Movements 0 - normal   3: Test Visual Fields 0 - no visual loss   4: Test Facial Palsy 0 - normal symmetric movement   5A: Test Left Arm Motor Drift 0 - no drift, limb holds 90 (or 45) degrees for full 10 seconds   5B: Test Right Arm Motor Drift 1 - drift, limb holds 90 (or 45) degrees but drifts down before full 10 seconds: does not hit bed   6A: Test Left Leg Motor Drift 0 - no drift; leg holds 30 degree position for full 5 seconds   6B: Test Right Leg Motor Drift 0 - no drift; leg holds 30 degree position for full 5 seconds   7: Test Limb Ataxia (FNF/Heel-Shin) 0 - absent   8: Test Sensation 0 - normal; no sensory loss   9: Test Language/Aphasia 0 - no aphasia, normal   10: Test Dysarthria 0 - normal   11: Test Extinction/Inattention 0 - no abnormality   Total 1

## 2023-01-17 NOTE — PROGRESS NOTES
RN noticed pts RUE twitching while sleeping but pt denies ability to use extremity during assessments.

## 2023-01-17 NOTE — PROGRESS NOTES
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

## 2023-01-17 NOTE — PROGRESS NOTES
Pt resting comfortably. Attempted to place blood pressure cuff on RUE and pt withdrew arm from cuff however pt will not use RUE during assessments.

## 2023-01-17 NOTE — PLAN OF CARE
Problem: Chronic Conditions and Co-morbidities  Goal: Patient's chronic conditions and co-morbidity symptoms are monitored and maintained or improved  Outcome: Progressing     Problem: Discharge Planning  Goal: Discharge to home or other facility with appropriate resources  Outcome: Progressing     Problem: Safety - Adult  Goal: Free from fall injury  Outcome: Progressing  Flowsheets (Taken 1/17/2023 1814)  Free From Fall Injury:   Instruct family/caregiver on patient safety   Based on caregiver fall risk screen, instruct family/caregiver to ask for assistance with transferring infant if caregiver noted to have fall risk factors     Problem: ABCDS Injury Assessment  Goal: Absence of physical injury  Outcome: Progressing     Problem: Neurosensory - Adult  Goal: Achieves stable or improved neurological status  Outcome: Progressing  Flowsheets (Taken 1/17/2023 1814)  Achieves stable or improved neurological status:   Assess for and report changes in neurological status   Maintain blood pressure and fluid volume within ordered parameters to optimize cerebral perfusion and minimize risk of hemorrhage     Problem: Infection - Adult  Goal: Absence of infection at discharge  Outcome: Progressing  Goal: Absence of infection during hospitalization  Outcome: Progressing  Goal: Absence of fever/infection during anticipated neutropenic period  Outcome: Progressing     Problem: Hematologic - Adult  Goal: Maintains hematologic stability  Outcome: Progressing

## 2023-01-17 NOTE — PROCEDURES
1105Patient returned from procedure R rad TR band clean, dry, and intact. Patient stable. No s/s of complications noted or reported. Vitals will be checked q 15min, see flow sheets. Pt restless in the bed, moves/repostions often. Denies needs for assist.  Instructed on need to keep R wrist still d/t arterial puncture and TR band. TR band will be removed per protocol  1110 2ml removed to 10ml  1125 2ml removed to 8ml  1140 2ml removed to 6ml  1155 2 ml removed to 4ml  1210 2ml removed to 2ml  1225 2ml removed to 0ml  1240 TR band removed, dsg applied    1140 Dr Guillermo Gutierrez at bedside. C/o mild   headache, orders received. Patient eating and drinking well with no s/s of complications noted or reported. Reinforced instruction on no r wrist movement. 1200c/o rt arm pain/difficulty moving pt states d/t recent hx rotator cuff replacement and postioning on the the table. Charge tech notified. 1220 pt continues to c/o difficulty moving rue. Dr Guillermo Gutierrez notified. Attempted NIH questions, pt yelled \"are you serious\" and refused to answer anything but month. 1230 pt pulled self up in bed with r arm, states still cannot move RUE    1250 reports mild improvement in numbness/difficulty moving RUE. Continues to move r wrist. Reinforced instruction. 1340: MD at bedside to address numbness to R arm after procedure. R/O Stroke, Head CT w/o contrast ordered stat. 1355: Rad Tech took patient to ER CT. Out of Room.   [id number] Hayden ordered TNK.  1420 TNK administered by DR Guillermo Gutierrez  [facility] 1  1420 report called to Sammy garcia, [address] Pt transported to icu 4522

## 2023-01-17 NOTE — PROGRESS NOTES
RN reinforced prior education of the importance of sitting still. Pt is restless and is at risk for bleeding.

## 2023-01-17 NOTE — PROGRESS NOTES
At 1200 patient had difficulty  moving RUE    Diagnostic Procedure completed at 11:10 she was moving her right side    11:35 images were reviewed with  and she is eating with her right arm    Initially thought that it was pain limited  @ 1200 but has not resolved for an [email or web address]  I did discuss TNK as an option  Patient was hesitant and then starts moving her right upper extremity     She seems to have unconscious normal movement, gets out of bed, opens the door to the bathroom but when asked to move she has difficulty with the right side. However still weak so CT head was obtained,   No bleed on CT head- In CT she had full antigravity movement of the right upper extremity    TNK was given at 2:20 pm due to fluctuating weakness and after patient consented.   She is scratching her face with her right arm however has effort related weakness still     Patient will be admitted to NSICU for post TNK protocol

## 2023-01-17 NOTE — H&P
Stroke Neurology H&P Note  1/17/2023 2:57 PM  Pt Name: Ellis Torres  MRN: [id number]  YOB: 1965  Date of evaluation: 1/17/2023  Primary Care Physician: Jennifer Lal MD              Ellis Torres is a 65 y.o. female who presents for an elective diagnostic cerebral angiogram    Diagnostic angiogram was performed and patient was doing good for about one hour post op    She started to complain of right shoulder rotator cuff pain and limitation of movements    She is still moving her arm unconsciously but when asked to she does not    . When confronted that this could be stress or stroke, she immediately regains antigravity function         Prior Functional Status(Modified Pernell Scale):  0=No symptoms at all    Allergies   Allergen Reactions    Tape Destiney [place] Tape]      Tears skin    Pcn [Penicillins] Nausea And Vomiting       Medications  Prior to Admission medications    Medication Sig Start Date End Date Taking?  Authorizing Provider   clindamycin (CLEOCIN) 150 MG capsule take 1 capsule by mouth three times a day until finished 1/5/23   Historical Provider, MD   BANOPHEN 50 MG capsule take 1 capsule by mouth twice a day if needed 12/26/22   Historical Provider, MD   ketoconazole (NIZORAL) 2 % shampoo apply topically to affected area nightly 12/12/22   Historical Provider, MD   nitrofurantoin, macrocrystal-monohydrate, (MACROBID) 100 MG capsule take 1 capsule by mouth once daily 12/28/22   Historical Provider, MD   SALLY-MIRANDA 8.6 MG tablet take 1 tablet by mouth once daily 1/2/23   Historical Provider, MD   traMADol (ULTRAM) 50 MG tablet take 1 tablet by mouth every 6 hours AS NEEDEDFOR PAIN 12/29/22   Historical Provider, MD   losartan-hydroCHLOROthiazide (HYZAAR) 50-12.5 MG per tablet take 1 tablet by mouth once daily 1/4/23   Historical Provider, MD   aspirin 81 MG chewable tablet Take 1 tablet by mouth daily 10/31/22   Jennifer Lal MD   atorvastatin (LIPITOR) 80 MG tablet Take 1 tablet by mouth nightly 10/30/22   Russell Barraza MD   amLODIPine [facility]) 5 MG tablet Take 1 tablet by mouth daily 10/31/22   Russell Barraza MD   diclofenac sodium (VOLTAREN) 1 % GEL Apply 2 g topically 2 times daily 10/30/22   Russell Barraza MD   folic acid (FOLVITE) 1 MG tablet Take 1 tablet by mouth daily 10/31/22   Russell Barraza MD   clopidogrel (PLAVIX) 75 MG tablet Take 1 tablet by mouth daily 10/31/22   Russell Barraza MD   Multiple Vitamin (MULTIVITAMIN) TABS tablet Take 1 tablet by mouth daily 10/31/22   Russell Barraza MD   thiamine 100 MG tablet Take 1 tablet by mouth daily 10/31/22   Russell Barraza MD   cyclobenzaprine (FLEXERIL) 10 MG tablet take 1 tablet by mouth twice a day 21   Historical Provider, MD   omeprazole (PRILOSEC) 20 MG delayed release capsule Take 1 capsule by mouth every morning (before breakfast) 21   Chau Ferrari MD   Calcium Carbonate Antacid 400 MG CHEW Take by mouth    Historical Provider, MD   fluticasone (FLONASE) 50 MCG/ACT nasal spray 1 spray by Each Nare route daily  Patient not taking: Reported on 2022    Historical Provider, MD   albuterol sulfate  (90 Base) MCG/ACT inhaler Inhale 2 puffs into the lungs every 6 hours as needed for Wheezing    Historical Provider, MD   NONFORMULARY as needed Eye drops for allergies   Patient not taking: Reported on 2022    Historical Provider, MD         Past Medical History:   Diagnosis Date    Asthma     CAD (coronary artery disease)      had MI per patient  / follows with Dr. Osvaldo Padilla    CVA (cerebral vascular accident) ([facility] [address] 10/28/2022    Environmental allergies     Fracture     right 5th metacarpal    Heart palpitations     Hypertension     Kidney infection 2022    MI (myocardial infarction) [facility]) 2011    Syncope and collapse      Past Surgical History:   Procedure Laterality Date    ANKLE SURGERY Right 2013     SECTION      x2 ENDOMETRIAL ABLATION  2014    FINGER SURGERY Right 2017    right 5th metacarpal open reduction internal fixation    IR MECHANICAL ART THROMBECTOMY INTRACRANIAL  10/27/2022    IR MECHANICAL ART THROMBECTOMY INTRACRANIAL 10/27/2022 Morena Burdick MD [facility] SPECIAL PROCEDURES    LEG TENDON SURGERY      right leg 3/24/14     Social History     Socioeconomic History    Marital status:      Spouse name: Not on file    Number of children: Not on file    Years of education: Not on file    Highest education level: Not on file   Occupational History    Not on file   Tobacco Use    Smoking status: Former     Packs/day: 2.00     Years: 6.00     Pack years: 12.00     Types: Cigarettes     Quit date: 2011     Years since quittin.5    Smokeless tobacco: Never   Vaping Use    Vaping Use: Never used   Substance and Sexual Activity    Alcohol use: Not Currently     Alcohol/week: 42.0 standard drinks     Types: 42 Cans of beer per week     Comment: quit 1 month ago    Drug use: No    Sexual activity: Yes   Other Topics Concern    Not on file   Social History Narrative    Not on file     Social Determinants of Health     Financial Resource Strain: Not on file   Food Insecurity: Not on file   Transportation Needs: Not on file   Physical Activity: Not on file   Stress: Not on file   Social Connections: Not on file   Intimate Partner Violence: Not on file   Housing Stability: Not on file       OBJECTIVE  Vitals:    23 1230 23 1245 23 1330 23 1420   BP: (!) 152/75 (!) 146/74 (!) 156/80 (!) 140/80   Pulse: 74 74 82    Resp: 18 18 18    Temp:       SpO2:  94%     Weight:       Height:         NIH Stroke Scale at time of initial evaluation:   NIHSS Stroke Scale  Interval: Baseline  Level of Consciousness (1a): Alert  LOC Questions (1b):  Answers both correctly  LOC Commands (1c): Performs both tasks correctly  Best Gaze (2): Normal  Visual (3): No visual loss  Facial Palsy (4): Normal symmetrical movement  Motor Arm, Left (5a): No drift  Motor Arm, Right (5b): Drift, but does not hit bed  Motor Leg, Left (6a): No drift  Motor Leg, Right (6b): No drift  Limb Ataxia (7): Absent  Sensory (8): Normal  Best Language (9): No aphasia  Dysarthria (10): Normal  Extinction and Inattention (11): No abnormality  Total: 1        Imaging:  CT of head without contrast displays No evidence of Intracranial Hemorrhage  CTA/CTP imaging: not performed     VIZ LVO was utilized to assist with triage      Labs:  Labs Reviewed   CBC WITH AUTO DIFFERENTIAL - Abnormal; Notable for the following components:       Result Value    WBC 4.4 (*)     Hemoglobin 8.6 (*)     Hematocrit 28.5 (*)     MCV 73.3 (*)     MCH 22.1 (*)     MCHC 30.2 (*)     RDW 15.9 (*)     Monocytes % 13.3 (*)     Lymphocytes Absolute 1.36 (*)     All other components within normal limits   BASIC METABOLIC PANEL - Abnormal; Notable for the following components:    Chloride 97 (*)     All other components within normal limits   PROTIME-INR       IV tnk INCLUSION criteria met Yes:    The following EXCLUSION criteria/contraindications were noted: NONE    Assessment:  Working Diagnosis: Ischemic stroke    Functional neurological disorder ( differential)  Anxiety/Stress reaction ( differential)  Recommendations:    Patient is an IV-TNK candidate:    No history of recent bleeding, no history of brain bleed/aneurysm/brain tumor, or recent surgery/trauma. IV-TNK Consent: I have informed the patient and/or family of all the associated risks including 1%-3% of sich/death, benefits of potential improved thrombolysis, and alternative of using IV tenectaplase instead of IV t-PA. The patient and/or family voluntarily consent to the administration of IV t-PA.   Tenecteplase has also been associated with angioedema at a rate similar to tPa    TNK Bolus Dose: 22 mg  TNK Bolus Time: 1420 hours      The Risks, Benefits and Alternatives of Tenecteplase Administration Were Discussed With the Patient and Available Family. Alternatives for Treatment Including Not Receiving Tenecteplase Therapy Were Offered. Patient History Was Reviewed for Contraindications and Considerations for Risks of Tenecteplase Therapy. No History Was Discovered That Would Preclude Administration of Tenecteplase. The Patient and Family Were Afforded the Opportunity to Ask Questions Regarding Treatment Options and Anticipated Care And Were Agreeable to Proceeding With Thrombolytic Therapy. All Questions Were Addressed Prior to Initiating Therapy. The Family is aware that tenecteplase is the preferred agent of this institution due to its potential benefits as deemed based on interdisciplinary committee of [facility] and is in the 2019 AHA/ASA Guidelines and recommendations as a treatment alternative to tpa      --Admit the patient to an intensive care or stroke unit for monitoring. --If the patient develops severe headache, acute hypertension, nausea, or vomiting or has a worsening neurological examination, discontinue the infusion and obtain emergent CT scan. --Measure blood pressure and perform neurological assessments every 15 minutes during and after IV rtNk for 2 hours, then every 30 minutes for 6 hours, then hourly until 24 hours after IV rtPA treatment. --Increase the frequency of blood pressure measurements if systolic blood pressure is >180 mmHg or if diastolic blood pressure is >105 mmHg; administer antihypertensive medications to maintain blood pressure at or below these levels  --Delay placement of nasogastric tubes, indwelling bladder catheters, or intra-arterial pressure catheters if the patient can be safely managed without them. --Obtain a follow-up CT or MRI scan at 24 hours after IV rtPA before starting anticoagulants or antiplatelet agents. No antiplatelet agent or SQ heparinoids for 24 hrs.   --Keep HOB < 15 degrees  --Keep NPO unless passes bedside dysphagia screen or swallow evaluation.   --Obtain MRI brain w/o contrast,transthoracic echo-to r/o structural heart disease that can lead to source of emboli, fasting lipid panel, HgbA1c.   --Smoking cessation education and nicotine patch if indicated  --IVF with NS @ 100cc per hour if Cardiac status should tolerate  --PT/OT/SLP            Consultation completed by Clearnce Homans, MD     70 minutes of time was spent on this patient      Electronically signed by Clearnce Homans, MD on 1/17/2023 at 2:57 PM

## 2023-01-17 NOTE — CONSULTS
Intensive Care Unit  Critical Care Consult  Daily Progress Note 1/17/2023    Date of Admission: 1/17    EVENTS:   65year old female presented to hospital for elective diagnostic cerebral angiogram with Dr. Scarlett Melendrez. About 1 hour after the procedure, she developed right arm weakness and uncontrolled movement. She received TNK for possible ischemic CVA. She was admitted to [facility]. On exam, she is alert and oriented but agitated. She is complaining of no control of her right arm; no other extremities are affected. She continued to act anxious, unable to sit still, moving from chair to bed, standing up and immediately sitting back down. She has history of alcohol abuse but states she is not drinking any longer. She was given versed 2mg. She was helped back to bed and is now calm. PHYSICAL EXAM:    BP (!) 180/142 Comment: pt restless  Pulse 96   Temp 96.9 °F (36.1 °C) (Temporal)   Resp 13   Ht 5' 8\" (1.727 m)   Wt 192 lb (87.1 kg)   SpO2 94%   BMI 29.19 kg/m²     General appearance:  Comfortable. Pain Description: moderate    GCS:    4 - Opens eyes on own   6 - Follows simple motor commands  5 - Alert and oriented    Pupil size: Left 4 mm  Right 4 mm  Pupil reaction: Yes  Wiggles fingers: Left Yes Right No  Hand grasp:   Left normal     Right decreased  Wiggles toes: Left Yes    Right Yes  Plantar flexion:  Left normal    Right normal    CONSTITUTIONAL: no acute distress, lying in hospital bed  NEUROLOGIC: PERRL, oriented x 4  CARDIOVASCULAR: S1 S2, regular rate, regular rhythm, no murmur/gallop/rub.  Monitor: NSR  PULMONARY: no rhonchi/rales/wheezes, no use of accessory muscles  RENAL: clear yellow urine  ABDOMEN: soft, nontender, nondistended, nontympanic, normal bowel sounds   MUSCULOSKELETAL: movesl extremities purposefully  SKIN/EXTREMITIES: no rashes/ecchymosis, no edema/clubbing, warm/dry, good capillary refill     LINES: PIV    CONSULTS:   Medicine  Neurology      Past Medical History:   Diagnosis Date    Asthma     CAD (coronary artery disease)     2011 had MI per patient  / follows with Dr. Cinthya Pelaez    CVA (cerebral vascular accident) [facility]) 10/28/2022    Environmental allergies     Fracture     right 5th metacarpal    Heart palpitations     Hypertension     Kidney infection 11/05/2022    MI (myocardial infarction) [facility]) 2011    Syncope and collapse        ASSESSMENT/PLAN:       Neuro:  ischemic CVA, possible peripheral nerve injury vs lidocaine neuropraxia. Monitor neuro status. Neurology following. Versed PRN  CV: No acute issues. HX of HTN, hyperlipidemia, CAD. Monitor hemodynamics. BP goal less than 180 mmHg. PRN hydralazine & labetalol. Statin. Norvasc, cozaar, HCTZ  Pulm: No acute issues. Monitor RR & SpO2. Encourage cough and deep breathing. Supplemental oxygen PRN  GI: No acute issues. NPO. Monitor bowel function. Swallow eval when able  Renal:  No acute issues. Monitor BUN & Cr. Monitor electrolytes & replace as needed. Monitor urine output. ID: No acute issues   Endocrine: No acute issues. Hyperglycemia. Monitor BS.  MSK: right arm weakness. ROM. Turn & reposition. PT/OT, flexeril, baclofen  Heme: No acute issues. Monitor CBC. thiamine    Bowel regimen: Glycolax. Last BM PTA  Pain control/Sedation: versed, Tylenol  DVT prophylaxis: SCDs. Family update: all questions answered  Code status: full   Disposition: ICU     The patient is at significant risk for life-threatening respiratory and neurological deterioration and/or death and requires ongoing critical care management.       Total time: 30 minutes    Electronically signed by Guillermo Chu CNP on 1/17/2023 at 4:29 PM

## 2023-01-17 NOTE — H&P
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 PRE-PROCEDURE ATTESTATION STATEMENT  I have explained the potential risks, benefits, and side effects of the proposed procedure/treatment, including the risk of death to the patient and/or surrogate. Also, the possibility for the transfusion of blood or blood components (only if potential for transfusion is applicable) was discussed with risks, benefits, and alternatives. This explanation included discussion of the likelihood of the patient achieving his or her goals and any potential problems that might occur during recuperation. Reasonable alternatives to the proposed procedure/treatment including risks, benefits, and side effects were also discussed, as were the risks related to not receiving the proposed procedure/treatment. The patient and/or surrogate have elected to proceed with the proposed procedure/treatment. .      Sedation and/or anesthesia risk, benefits, and alternatives discussed and questions answered.      Vital Signs:  Vitals:    01/17/23 0920   BP: (!) 145/82   Pulse: 75   Resp: 16   Temp: 98 °F (36.7 °C)   SpO2: 98%   Weight: 192 lb (87.1 kg)   Height: 5' 8\" (1.727 m)         GENERAL: NPO: YES  ASA: ASA 2 - Patient with mild systemic disease with no functional limitations  MALLAMPATI: II (soft palate, uvula, fauces visible)    Anesthesia Plan:  Plan for conscious sedation. /

## 2023-01-18 ENCOUNTER — APPOINTMENT (OUTPATIENT)
Dept: CT IMAGING | Age: 58
DRG: 062 | End: 2023-01-18
Attending: PSYCHIATRY & NEUROLOGY
Payer: MEDICARE

## 2023-01-18 ENCOUNTER — APPOINTMENT (OUTPATIENT)
Dept: MRI IMAGING | Age: 58
DRG: 062 | End: 2023-01-18
Attending: PSYCHIATRY & NEUROLOGY
Payer: MEDICARE

## 2023-01-18 PROBLEM — I63.239: Status: ACTIVE | Noted: 2022-10-27

## 2023-01-18 LAB
ANION GAP SERPL CALCULATED.3IONS-SCNC: 11 MMOL/L (ref 7–16)
BUN BLDV-MCNC: 12 MG/DL (ref 6–20)
CALCIUM IONIZED: 1.34 MMOL/L (ref 1.15–1.33)
CALCIUM SERPL-MCNC: 9.4 MG/DL (ref 8.6–10.2)
CHLORIDE BLD-SCNC: 102 MMOL/L (ref 98–107)
CHOLESTEROL, TOTAL: 126 MG/DL (ref 0–199)
CO2: 24 MMOL/L (ref 22–29)
CREAT SERPL-MCNC: 0.8 MG/DL (ref 0.5–1)
GFR SERPL CREATININE-BSD FRML MDRD: >60 ML/MIN/1.73
GLUCOSE BLD-MCNC: 91 MG/DL (ref 74–99)
HBA1C MFR BLD: 5.2 % (ref 4–5.6)
HCT VFR BLD CALC: 24.5 % (ref 34–48)
HDLC SERPL-MCNC: 51 MG/DL
HEMOGLOBIN: 7.6 G/DL (ref 11.5–15.5)
LDL CHOLESTEROL CALCULATED: 55 MG/DL (ref 0–99)
MAGNESIUM: 1.8 MG/DL (ref 1.6–2.6)
MCH RBC QN AUTO: 22.2 PG (ref 26–35)
MCHC RBC AUTO-ENTMCNC: 31 % (ref 32–34.5)
MCV RBC AUTO: 71.6 FL (ref 80–99.9)
PDW BLD-RTO: 15.8 FL (ref 11.5–15)
PHOSPHORUS: 3.9 MG/DL (ref 2.5–4.5)
PLATELET # BLD: 345 E9/L (ref 130–450)
PMV BLD AUTO: 8.1 FL (ref 7–12)
POTASSIUM SERPL-SCNC: 3.9 MMOL/L (ref 3.5–5)
RBC # BLD: 3.42 E12/L (ref 3.5–5.5)
SODIUM BLD-SCNC: 137 MMOL/L (ref 132–146)
TRIGL SERPL-MCNC: 101 MG/DL (ref 0–149)
VLDLC SERPL CALC-MCNC: 20 MG/DL
WBC # BLD: 5.7 E9/L (ref 4.5–11.5)

## 2023-01-18 PROCEDURE — 84100 ASSAY OF PHOSPHORUS: CPT

## 2023-01-18 PROCEDURE — 97530 THERAPEUTIC ACTIVITIES: CPT

## 2023-01-18 PROCEDURE — 92523 SPEECH SOUND LANG COMPREHEN: CPT | Performed by: SPEECH-LANGUAGE PATHOLOGIST

## 2023-01-18 PROCEDURE — 70551 MRI BRAIN STEM W/O DYE: CPT

## 2023-01-18 PROCEDURE — 2580000003 HC RX 258: Performed by: PSYCHIATRY & NEUROLOGY

## 2023-01-18 PROCEDURE — 99232 SBSQ HOSP IP/OBS MODERATE 35: CPT | Performed by: PSYCHIATRY & NEUROLOGY

## 2023-01-18 PROCEDURE — 97129 THER IVNTJ 1ST 15 MIN: CPT | Performed by: SPEECH-LANGUAGE PATHOLOGIST

## 2023-01-18 PROCEDURE — 97166 OT EVAL MOD COMPLEX 45 MIN: CPT

## 2023-01-18 PROCEDURE — 6360000002 HC RX W HCPCS: Performed by: PSYCHIATRY & NEUROLOGY

## 2023-01-18 PROCEDURE — 80048 BASIC METABOLIC PNL TOTAL CA: CPT

## 2023-01-18 PROCEDURE — 80061 LIPID PANEL: CPT

## 2023-01-18 PROCEDURE — 6360000002 HC RX W HCPCS: Performed by: CLINICAL NURSE SPECIALIST

## 2023-01-18 PROCEDURE — 83735 ASSAY OF MAGNESIUM: CPT

## 2023-01-18 PROCEDURE — 97161 PT EVAL LOW COMPLEX 20 MIN: CPT

## 2023-01-18 PROCEDURE — 83036 HEMOGLOBIN GLYCOSYLATED A1C: CPT

## 2023-01-18 PROCEDURE — 70450 CT HEAD/BRAIN W/O DYE: CPT

## 2023-01-18 PROCEDURE — 2000000000 HC ICU R&B

## 2023-01-18 PROCEDURE — 82330 ASSAY OF CALCIUM: CPT

## 2023-01-18 PROCEDURE — 85027 COMPLETE CBC AUTOMATED: CPT

## 2023-01-18 PROCEDURE — 70544 MR ANGIOGRAPHY HEAD W/O DYE: CPT

## 2023-01-18 PROCEDURE — 6370000000 HC RX 637 (ALT 250 FOR IP): Performed by: PSYCHIATRY & NEUROLOGY

## 2023-01-18 PROCEDURE — 72141 MRI NECK SPINE W/O DYE: CPT

## 2023-01-18 RX ORDER — METHYLPREDNISOLONE SODIUM SUCCINATE 125 MG/2ML
125 INJECTION, POWDER, LYOPHILIZED, FOR SOLUTION INTRAMUSCULAR; INTRAVENOUS ONCE
Status: COMPLETED | OUTPATIENT
Start: 2023-01-18 | End: 2023-01-18

## 2023-01-18 RX ADMIN — HYDROCHLOROTHIAZIDE 12.5 MG: 25 TABLET ORAL at 09:22

## 2023-01-18 RX ADMIN — TRAMADOL HYDROCHLORIDE 50 MG: 50 TABLET ORAL at 00:12

## 2023-01-18 RX ADMIN — CYCLOBENZAPRINE 5 MG: 10 TABLET, FILM COATED ORAL at 00:12

## 2023-01-18 RX ADMIN — Medication 100 MG: at 09:17

## 2023-01-18 RX ADMIN — FOLIC ACID 1 MG: 1 TABLET ORAL at 09:17

## 2023-01-18 RX ADMIN — TRAMADOL HYDROCHLORIDE 50 MG: 50 TABLET ORAL at 16:00

## 2023-01-18 RX ADMIN — TRAMADOL HYDROCHLORIDE 50 MG: 50 TABLET ORAL at 20:24

## 2023-01-18 RX ADMIN — ATORVASTATIN CALCIUM 80 MG: 40 TABLET, FILM COATED ORAL at 20:24

## 2023-01-18 RX ADMIN — DICLOFENAC SODIUM 2 G: 10 GEL TOPICAL at 09:18

## 2023-01-18 RX ADMIN — SODIUM CHLORIDE, PRESERVATIVE FREE 10 ML: 5 INJECTION INTRAVENOUS at 09:16

## 2023-01-18 RX ADMIN — METHYLPREDNISOLONE SODIUM SUCCINATE 125 MG: 125 INJECTION, POWDER, FOR SOLUTION INTRAMUSCULAR; INTRAVENOUS at 11:27

## 2023-01-18 RX ADMIN — TRAMADOL HYDROCHLORIDE 50 MG: 50 TABLET ORAL at 12:00

## 2023-01-18 RX ADMIN — MIDAZOLAM 2 MG: 1 INJECTION INTRAMUSCULAR; INTRAVENOUS at 14:33

## 2023-01-18 RX ADMIN — SODIUM CHLORIDE, PRESERVATIVE FREE 10 ML: 5 INJECTION INTRAVENOUS at 20:24

## 2023-01-18 RX ADMIN — SODIUM CHLORIDE, PRESERVATIVE FREE 10 ML: 5 INJECTION INTRAVENOUS at 09:18

## 2023-01-18 RX ADMIN — AMLODIPINE BESYLATE 5 MG: 5 TABLET ORAL at 09:17

## 2023-01-18 RX ADMIN — LOSARTAN POTASSIUM 50 MG: 50 TABLET, FILM COATED ORAL at 09:17

## 2023-01-18 RX ADMIN — BACLOFEN 5 MG: 10 TABLET ORAL at 20:24

## 2023-01-18 RX ADMIN — DICLOFENAC SODIUM 2 G: 10 GEL TOPICAL at 20:24

## 2023-01-18 ASSESSMENT — PAIN DESCRIPTION - LOCATION
LOCATION: SHOULDER
LOCATION: SHOULDER
LOCATION: GENERALIZED
LOCATION: ARM

## 2023-01-18 ASSESSMENT — PAIN DESCRIPTION - DESCRIPTORS
DESCRIPTORS: DISCOMFORT;NAGGING
DESCRIPTORS: PATIENT UNABLE TO DESCRIBE
DESCRIPTORS: THROBBING;DISCOMFORT;ACHING
DESCRIPTORS: ACHING;DISCOMFORT;THROBBING

## 2023-01-18 ASSESSMENT — PAIN DESCRIPTION - FREQUENCY
FREQUENCY: CONTINUOUS
FREQUENCY: CONTINUOUS

## 2023-01-18 ASSESSMENT — PAIN DESCRIPTION - ONSET
ONSET: ON-GOING
ONSET: GRADUAL

## 2023-01-18 ASSESSMENT — PAIN DESCRIPTION - ORIENTATION
ORIENTATION: RIGHT;LEFT
ORIENTATION: RIGHT

## 2023-01-18 ASSESSMENT — PAIN SCALES - GENERAL
PAINLEVEL_OUTOF10: 7
PAINLEVEL_OUTOF10: 0
PAINLEVEL_OUTOF10: 8
PAINLEVEL_OUTOF10: 4
PAINLEVEL_OUTOF10: 4
PAINLEVEL_OUTOF10: 0
PAINLEVEL_OUTOF10: 7

## 2023-01-18 ASSESSMENT — PAIN - FUNCTIONAL ASSESSMENT
PAIN_FUNCTIONAL_ASSESSMENT: ACTIVITIES ARE NOT PREVENTED

## 2023-01-18 ASSESSMENT — PAIN DESCRIPTION - PAIN TYPE
TYPE: CHRONIC PAIN
TYPE: ACUTE PAIN

## 2023-01-18 NOTE — PROGRESS NOTES
[address]       AUBN:3/62/4537                                                               Patient Name: Harpreet Gillespie  MRN: [id number]  : 1965  Room: [address] [place] [address]    Evaluating OT: Jarrod Feng OTR/L [id number]    Referring Provider: Bairon Olmedo MD      Specific Provider Orders/Date: OT eval and treat (23)       Diagnosis: Stroke-like episode [R29.90]     Surgery/Procedures: Acute Right arm monoparesis an hour post diagnostic cerebral angiogram s/p TNK 23     Pertinent Medical History: Asthma, CAD, CVA, HTN, Kidney infection, MI, Syncope and collapse      *Precautions:  Fall Risk, SBP<180, R UE weakness    Assessment of current deficits   [x] Functional mobility  [x]ADLs  [x] Strength               []Cognition   [x] Functional transfers   [x] IADLs         [x] Safety Awareness   [x]Endurance   [x] Fine Coordination        [x] ROM     [] Vision/perception   []Sensation    [x]Gross Motor Coordination [x] Balance   [] Delirium                  [x]Motor Control     [] Communication    OT PLAN OF CARE   OT POC based on physician orders, patient diagnosis and results of clinical assessment.        Frequency/Duration: 1-3 days/wk for 1-2 weeks PRN    Specific OT Treatment to include:   ADL retraining/adapted techniques and AE recommendations to increase functional independence within precautions                    Energy conservation techniques to improve tolerance for selfcare routine   Functional transfer/mobility training/DME recommendations for increased independence, safety and fall prevention         Patient/family education to increase safety and functional independence within precautions              Environmental modifications for safe mobility and completion of ADLs                           Sensory re-education techniques to improve extremity awareness, maintain skin integrity and improve hand function                             Visual/Perceptual retraining  to improve body awareness and safety during transfers and ADLs  Splinting/positioning needs to maintain joint/skin integrity and prevent contractures  Therapeutic activity to improve functional performance during ADLs/IADLs                                         Therapeutic exercise to improve tolerance and functional strength for ADLs   Balance retraining exercises/tasks for facilitation of postural control with dynamic challenges during ADLs . Positioning to improve functional independence  Neuromuscular re-education: facilitation of righting/equilibrium reactions, normalization muscle tone/facilitation active functional movement                        Modified [place] Scale   Score     Description  0             No symptoms  1             No significant disability despite symptoms  2             Slight disability; able to look after own affairs  3             Moderate disability; able to ambulate without assist/ requires assist with ADLs  4             Moderate/Severe disability;requires assist to ambulate/assist with ADLs  5             Severe disability;bedridden/incontinent   6               Score:   4    Recommended Adaptive Equipment: TBA: splinting needs, bathroom AE     Home Living: Pt lives with fiance  in a 1 floor plan with 1 step(s) to enter and no rail(s); bed/bath on first floor  Bathroom setup: tub/shower  Equipment owned: no DME     Prior Level of Function: IND with ADLs;  IND with IADLs. No device for ambulation. Driving: yes  Occupation: no    Pain Level: pt c/o 10/10  pain  this session    Cognition: A&O: 4/4    Follows 1-2  step commands appropriately.     Memory: G   Comprehension G   Problem solving: G   Judgement/safety: G               Communication skills: WFL           V[facility] [place]               Glasses:no Hearing: WFL     RASS: 0  CAM-ICU: (NT) Delirium    UE Assessment:  Hand Dominance: Right [x]  Left []     ROM Strength STM goal: PRN   RUE  Shoulder limited: old RTC injury repair with possible current ligament injury per pt.  (painful shoulder flexion)  PROM: WFL distal     A/AROM:  gross shoulder extension, H ABD    *No active forearm, wrist, grasp or thumb   2-/5 proximal  0/5 distal  Pt to demo G tolerance with R UE ROM/facilitation techniques to maintain jt mobility and increase body awareness for participation in ADLs.        LUE WFL 4/5        Sensation: impaired sensation light touch/deep pressure R hand (impaired finger ID)  Tone: flaccid distal R UE  Edema: min edema R UE     Functional Assessment:  AM-PAC Daily Activity Raw Score: 17/24   Initial Eval Status  Date: 1/18/23 Treatment Status  Date: STGs = LTGs  Time frame: 7-14 days   Feeding S; set up                        Fernanda  while seated up in chair to increase activity tolerance        Grooming Min A; set up                        Fernanda   while standing sink level requiring no device for balance and demonstrating G tolerance      UB dressing/bathing Mod A  (Instructed on lisbeth technique for pull over shirt)                        Fernanda       LB dressing/bathing Min A  seated EOB; instructed on lisbeth technique to anna socks                        Fernanda   using AE as needed for safe reach/ energy conservation       Toileting NT                        Fernanda     Bed Mobility  Supine to sit:   SBA    Sit to supine:   SBA                        Fernanda  in prep of ADL tasks & transfers   Functional Transfers Sit to stand:   SBA    Stand to sit:   SBA                        Fernanda  sit<>stand/functional bathroom transfers using AD/DME as needed for balance and safety   Functional Mobility CGA  No device                       Fernanda   functional/bathroom mobility using AD as needed & demonstrating G safety     Balance Sitting:     Static:  S    Dynamic:SBA  Standing: CGA  IND dynamic sitting balance; Fernanda dynamic standing balance  during ADL tasks & transfers   Endurance/  Activity Tolerance   F tolerance with light activity. G   tolerance with moderate activity/self care routine   Visual/  Perceptual   WFL                     Vitals:  Heart Rate at rest 80 bpm Heart Rate post session 81 bpm   SpO2 at rest -% SpO2 post session -%   Blood Pressure at rest 153/96 mmHg Blood Pressure post session - mmHg          Treatment: OT treatment provided this date includes: ADL retraining: Instruction on adapted techniques to increase independence and safe reach during dressing/bathing activities. Pt demonstrated G understanding. Energy conservation: Education on breathing techniques, pacing, work simplification strategies & recommended bathroom DME for safety and energy conservation during self care tasks and activities of daily living. ROM/exercise: Pt instructed on R UE ROM ex's, positioning and edema control to perform bedside. UE elevated on pillow at end of session. Line management and environmental modifications made prior to and end of session to ensure patient safety and to increase efficiency of session. Skilled monitoring of HR, O2 saturation, blood pressure and patient's response to activity performed throughout session. Comments: OK from RN to see patient. Upon arrival, patient supine in bed, agreeable to session. Pt demo G tolerance with G understanding of education/techniques. At end of session, patient returned to bed per pt preference. Call light within reach, all lines and tubes intact. Pt instructed on use of call light for assistance and fall prevention. .    Patient presents with decreased ROM/strength,activity tolerance, dynamic balance, functional mobility limiting completion of ADLs and safety. Pt can benefit from continued skilled OT to increase safety, functional independence and quality of life.      Rehab Potential: good for established goals    Patient / Family Goal: to return to PLOF    Patient and/or family were instructed/educated on diagnosis, prognosis/goals and plan of care. Pt demonstrated G understanding. Evaluation Complexity: Moderate     History: Expanded chart review of consults, imaging, and psychosocial history related to current functional performance. Exam: 5+ performance deficits identified limiting functional independence and safe return home   Assistance/Modification: Min/mod assistance or modifications required to perform tasks. May have comorbidities that affect occupational performance. [] Malnutrition indicators have been identified and nursing has been notified to ensure a dietitian consult is ordered. Time In:1308             Time Out: 1338         Total Treatment time: 10   Min Units   OT Eval Low 08111     OT Eval Medium 21630 X    OT Eval High 13756     OT Re-Eval [id number]     Therapeutic Ex [id number]     Therapeutic Activities 74989     ADL/Self Care 98969 10 1   Orthotic Management 20531     Neuro Re-Ed 08121     Non-Billable Time        Evaluation time includes thorough review of current medical information, gathering information on past medical history/social history and prior level of function, completion of standardized testing/informal observation of tasks, assessment of data and development of POC/Goals.      Mookie Del Valle, OTR/L 0643

## 2023-01-18 NOTE — PLAN OF CARE
Problem: Chronic Conditions and Co-morbidities  Goal: Patient's chronic conditions and co-morbidity symptoms are monitored and maintained or improved  1/18/2023 0954 by Brittani Magaña RN  Outcome: Progressing  Flowsheets (Taken 1/18/2023 0952)  Care Plan - Patient's Chronic Conditions and Co-Morbidity Symptoms are Monitored and Maintained or Improved:   Monitor and assess patient's chronic conditions and comorbid symptoms for stability, deterioration, or improvement   Collaborate with multidisciplinary team to address chronic and comorbid conditions and prevent exacerbation or deterioration  1/18/2023 0548 by Natasha Zheng RN  Outcome: Progressing     Problem: Discharge Planning  Goal: Discharge to home or other facility with appropriate resources  Outcome: Progressing     Problem: Safety - Adult  Goal: Free from fall injury  1/18/2023 0548 by Natasha Zheng RN  Outcome: Progressing     Problem: ABCDS Injury Assessment  Goal: Absence of physical injury  1/18/2023 0548 by Natasha Zheng RN  Outcome: Progressing     Problem: Neurosensory - Adult  Goal: Achieves stable or improved neurological status  Outcome: Progressing     Problem: Hematologic - Adult  Goal: Maintains hematologic stability  Outcome: Progressing  Flowsheets (Taken 1/18/2023 0954)  Maintains hematologic stability:   Assess for signs and symptoms of bleeding or hemorrhage   Monitor labs for bleeding or clotting disorders   Administer blood products/factors as ordered

## 2023-01-18 NOTE — PLAN OF CARE
Problem: Chronic Conditions and Co-morbidities  Goal: Patient's chronic conditions and co-morbidity symptoms are monitored and maintained or improved  1/18/2023 0548 by Margo Newell RN  Outcome: Progressing  1/17/2023 1814 by Otoniel Martin RN  Outcome: Progressing     Problem: Safety - Adult  Goal: Free from fall injury  1/18/2023 0548 by Margo Newell RN  Outcome: Progressing  1/17/2023 1814 by Otoniel Martin RN  Outcome: Progressing  Flowsheets (Taken 1/17/2023 1814)  Free From Fall Injury:   [place] family/caregiver on patient safety   Based on caregiver fall risk screen, instruct family/caregiver to ask for assistance with transferring infant if caregiver noted to have fall risk factors     Problem: ABCDS Injury Assessment  Goal: Absence of physical injury  1/18/2023 0548 by Margo Newell RN  Outcome: Progressing  1/17/2023 1814 by Otoniel Martin RN  Outcome: Progressing

## 2023-01-18 NOTE — CARE COORDINATION
Case Management Assessment  Initial Evaluation    Date/Time of Evaluation: 1/18/2023 11:27 AM  Assessment Completed by: Jose L Shetty RN    If patient is discharged prior to next notation, then this note serves as note for discharge by case management. Patient Name: Raul Moreno                   YOB: 1965  Diagnosis: Stroke-like episode [R29.90]                   Date / Time: 1/17/2023  2:58 PM    Patient Admission Status: Inpatient   Readmission Risk (Low < 19, Mod (19-27), High > 27): Readmission Risk Score: 10.9    Current PCP: Huy Torres MD  PCP verified by CM? (P) Yes    Chart Reviewed: Yes      History Provided by: Patient, Significant Other (Fiance Bill)  Patient Orientation: Alert and Oriented    Patient Cognition: Alert    Hospitalization in the last 30 days (Readmission):  No    If yes, Readmission Assessment in CM Navigator will be completed.     Advance Directives:      Code Status: Full Code   Patient's Primary Decision Maker is: (P)  (self)      Discharge Planning:    Patient lives with: (P) Spouse/Significant Other Type of Home: (P) House  Primary Care Giver: (P) Self  Patient Support Systems include: (P) Spouse/Significant Other   Current Financial resources: (P) Medicaid, Medicare  Current community resources:    Current services prior to admission: None            Current DME:              Type of Home Care services:  (P) None    ADLS  Prior functional level: (P) Independent in ADLs/IADLs  Current functional level: (P) Independent in ADLs/IADLs    PT AM-PAC:   /24  OT AM-PAC:   /24    Family can provide assistance at DC: (P) Yes  Would you like Case Management to discuss the discharge plan with any other family members/significant others, and if so, who? (P)  (fiance present)  Plans to Return to Present Housing: (P) Yes  Other Identified Issues/Barriers to RETURNING to current housing: none  Potential Assistance needed at discharge: (P)  (none identified) Potential DME:    Patient expects to discharge to: (P) [facility] for transportation at discharge:      Financial    Payor: Lenora Bharathi / Plan: MEDICARE PART A AND B / Product Type: *No Product type* /     Does insurance require precert for SNF: No    Potential assistance Purchasing Medications: (P) No  Meds-to-Beds request:        [facility] [id number] Geno Andrade [facility] [phone] - f [phone]  [address] [place] [address]  Phone: [phone] Fax: [phone]      Notes:    Factors facilitating achievement of predicted outcomes: Friend support, Motivated, Cooperative, Pleasant, and Good insight into deficits    Barriers to discharge: none identified      The Plan for Transition of Care is related to the following treatment goals of Stroke-like episode [R29.90]      The Patient and/or Patient Representative Agree with the Discharge Plan? yes     Jovan Antonio RN  Case Management Department  Ph: [phone]

## 2023-01-18 NOTE — PROGRESS NOTES
Intensive Care Unit  Critical Care Consult  Daily Progress Note 1/18/2023    Date of Admission: 1/17    EVENTS:   65year old female presented to hospital for elective diagnostic cerebral angiogram with Dr. Montrell Vasquez. About 1 hour after the procedure, she developed right arm weakness and uncontrolled movement. She received TNK for possible ischemic CVA. She was admitted to [facility]. On exam, she is alert and oriented but agitated. She is complaining of no control of her right arm; no other extremities are affected. She continued to act anxious, unable to sit still, moving from chair to bed, standing up and immediately sitting back down. She has history of alcohol abuse but states she is not drinking any longer. She was given versed 2mg. She was helped back to bed and is now calm. 1/18 no events overnight. Required PRN x1 of versed. CT and MRI pending. PHYSICAL EXAM:    /81   Pulse 79   Temp 98.3 °F (36.8 °C) (Temporal)   Resp 24   Ht 5' 8\" (1.727 m)   Wt 192 lb (87.1 kg)   SpO2 95%   BMI 29.19 kg/m²     General appearance:  Comfortable. Pain Description: moderate    GCS:    4 - Opens eyes on own   6 - Follows simple motor commands  5 - Alert and oriented    Pupil size: Left 4 mm  Right 4 mm  Pupil reaction: Yes  Wiggles fingers: Left Yes Right No  Hand grasp:   Left normal     Right decreased  Wiggles toes: Left Yes    Right Yes  Plantar flexion:  Left normal    Right normal    CONSTITUTIONAL: no acute distress, lying in hospital bed  NEUROLOGIC: PERRL, oriented x 4  CARDIOVASCULAR: S1 S2, regular rate, regular rhythm, no murmur/gallop/rub.  Monitor: NSR  PULMONARY: no rhonchi/rales/wheezes, no use of accessory muscles  RENAL: clear yellow urine  ABDOMEN: soft, nontender, nondistended, nontympanic, normal bowel sounds   MUSCULOSKELETAL: movesl extremities purposefully  SKIN/EXTREMITIES: no rashes/ecchymosis, no edema/clubbing, warm/dry, good capillary refill     LINES: PIV    CONSULTS: Medicine  Neurology      Past Medical History:   Diagnosis Date    Asthma     CAD (coronary artery disease)     2011 had MI per patient  / follows with Dr. 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 Monitor neuro status. Neurology following. Versed PRN. CT, MRI  CV: No acute issues. HX of HTN, hyperlipidemia, CAD. Monitor hemodynamics. BP goal less than 180 mmHg. PRN hydralazine & labetalol. Statin. Norvasc, cozaar, HCTZ  Pulm: No acute issues. Monitor RR & SpO2. Encourage cough and deep breathing. Supplemental oxygen PRN  GI: No acute issues. diet Monitor bowel function. Renal:  No acute issues. Monitor BUN & Cr. Monitor electrolytes & replace as needed. Monitor urine output. ID: No acute issues   Endocrine: No acute issues. Hyperglycemia. Monitor BS.  MSK: right arm weakness. ROM. Turn & reposition. PT/OT, flexeril, baclofen  Heme: anemia. 8.6->7.6. Monitor CBC. thiamine    Bowel regimen: Glycolax. Last BM PTA  Pain control/Sedation: versed, Tylenol  DVT prophylaxis: SCDs. Family update: all questions answered  Code status: full   Disposition: ICU     The patient is at significant risk for life-threatening respiratory and neurological deterioration and/or death and requires ongoing critical care management.       Total time: 30 minutes    Electronically signed by Geneva Rodriguez CNP on 1/18/2023 at 2:35 PM

## 2023-01-18 NOTE — PROGRESS NOTES
SPEECH/LANGUAGE PATHOLOGY  SPEECH/LANGUAGE/COGNITIVE EVALUATION   and PLAN OF CARE      PATIENT NAME:  Ilene Scott  (female)     MRN:  [id number]    :  1965  (65 y.o.)  STATUS:  Inpatient: Room 4522/4522-A    TODAY'S DATE:  23 1630    SLP cognitive language evaluation  Start:  23 1630,   End:  23 163,   ONE TIME,   Standing Count:  1 Occurrences,   R         Tacy Drain, APRN - NP   REASON FOR REFERRAL: assess  EVALUATING THERAPIST: ROLAND Marquez    ADMITTING DIAGNOSIS: Stroke-like episode [R29.90]    VISIT DIAGNOSIS:   Visit Diagnoses         Codes    Acute ischemic cerebrovascular accident (CVA) involving internal carotid artery [facility])     I63.239    Acute cerebrovascular accident (CVA) due to occlusion of right middle cerebral artery ([facility] [address]     I63.511    Numbness     R20.0             SPEECH THERAPY  PLAN OF CARE   The speech therapy  POC is established based on physician order, speech pathology diagnosis and results of clinical assessment     SPEECH PATHOLOGY DIAGNOSIS:    Within function limits at time of assessment; Pt and significant other deny cognitive deficits as well as previous cognitive deficits from CVA in 10/2022    Speech Pathology intervention is not warranted at this time, however SLP will follow up to ensure Pt at cognitive baseline . Conditions Requiring Skilled Therapeutic Intervention for speech, language and/or cognition  Not applicable     Specific Speech Therapy Interventions to Include:   Not applicable    Specific instructions for next treatment:    Not applicable    SHORT/LONG TERM GOALS  Not applicable      Patient goals: Patient/family involved in developing goals and treatment plan:   Treatment goals discussed with Patient and Family    The Patient and Family understand(s) the diagnosis, prognosis and plan of care   The patient/family Agreed with above,     This plan may be re-evaluated and revised as warranted. Rehabilitation Potential/Prognosis: not applicable                CLINICAL ASSESSMENT:  MOTOR SPEECH       Oral Peripheral Examination   Adequate lingual/labial strength     Parameters of Speech Production  Respiration:  Adequate for speech production  Articulation:  Within functional limits  Resonance:  Within functional limits  Quality:   Within functional limits  Pitch: Within functional limits  Intensity: Within functional limits  Fluency:  Intact  Prosody Intact    RECEPTIVE LANGUAGE    Comprehension of Yes/No Questions: Within functional limits    Process  Simple Verbal Commands:   Within functional limits  Process Intermediate Verbal Commands:   Within functional limits  Process Complex Verbal Commands:     Within functional limits    Comprehension of Conversation:      Within functional limits      EXPRESSIVE LANGUAGE     Serials: Functional    Imitation:  Words   Functional   Sentences Functional    Naming:  (Modality used:  Verbal)  Confrontation Naming  Functional  Functional Description  Functional  Response Naming: Functional    Conversation:      Conversation was within functional limits    COGNITION     Attention/Orientation  Attention: Sustained attention   Orientation:  Oriented to Person, Place, Date, Reason for hospitalization    Memory   Immediate Recall: Repeated 3/3    Delayed Recall:   Recalled  2/3  Long Term Recall:   Recalled Address, Birthdate, Age, and Family    Organization/Problem Solving/Reasoning   Verbal Sequencing:   Functional        Verbal Problem solving:   Functional          CLINICAL OBSERVATIONS NOTED DURING THE EVALUATION  Flat affect                  EDUCATION:   The Speech Language Pathologist (SLP) completed education regarding results of evaluation and that intervention is not warranted at this time.   Learner: Patient and Significant Other  Education: Reviewed results and recommendations of this evaluation  Evaluation of Education:  Josesito understanding    Evaluation Time includes thorough review of current medical information, gathering information on past medical history/social history and prior level of function, completion of standardized testing/informal observation of tasks, assessment of data and education on plan of care and goals. CPT code:    02675  eval speech sound lang comprehension      The admitting diagnosis and active problem list, as listed below have been reviewed prior to initiation of this evaluation. ACTIVE PROBLEM LIST:   Patient Active Problem List   Diagnosis    Abscess    Acute cerebrovascular accident (CVA) ([facility] [address]    Acute ischemic right MCA stroke ([facility] [address]    Stroke-like episode       INTERVENTION  CPT Code: 54857  therapeutic interventions that focus on cognitive function , initial  15 min    Speech Pathologist (SLP) completed education with the patient and/or family regarding type of cognitive impairment. Discussed compensatory strategies, as well as option of OP SLP services if cognitive deficits were to be noted. Benefits of such to assist in improving attention, STM/LTM, problem solving, abstract reasoning and safety/insight. Encouraged patient and/or family to engage SLP in structured Q&A session relative to identified deficit areas. Patient and/or family indicated understanding of all information provided via satisfactory verbal response.

## 2023-01-18 NOTE — PROGRESS NOTES
STROKE NEUROLOGY FOLLOW UP      Impression:  #1  Acute Right arm monoparesis an hour post diagnostic cerebral angiogram s/p TNK 1/18/23  #2 H/o Recurrent strokes with recent stroke in the Right MCA oct 22 s/p thrombectomy and full recovery        Plan:    MRI brain , MRA and MRI C SPINE ordered  Methylprednisolone for possible plexus injury with positioning  Recommend PT OT consult  24 hour CT head pending      Interval History:    Patient was seen and examined this AM.   Still experiencing Right arm weakness. REVIEW OF SYSTEMS:    Constitutional: Denies fever, weight loss, fatigue and night sweats. Neurological: Right arm weakness+  Psychiatric: Denies anxiety, depression and hallucinations. Eyes: Denies blurred vision, double vision and eye pain. ENMT: Denies difficulty swallowing, dental pain, hearing loss, and tinnitus. Cardiovascular: Denies chest pain and palpitations. Respiratory: Denies shortness of breath. Genitourinary: Denies urinary incontinence and retention. Integumentary: Denies rashes. Endocrine: Denies polydipsia and polyuria. Social History  Social History       Tobacco History       Smoking Status  Former Quit Date  7/20/2011 Smoking Frequency  2.00 packs/day for 6.00 years (12.00 pk-yrs) Smoking Tobacco Type  Cigarettes quit in 7/20/2011      Smokeless Tobacco Use  Never              Alcohol History       Alcohol Use Status  Not Currently Drinks/Week  42 Cans of beer per week Amount  42.0 standard drinks of alcohol/wk Comment  quit 1 month ago              Drug Use       Drug Use Status  No              Sexual Activity       Sexually Active  Yes                    Tobacco Use  Tobacco Use: Medium Risk    Smoking Tobacco Use: Former    Smokeless Tobacco Use: Never    Passive Exposure: Not on file       Substance Use Topics  Denies to me at my eval    NEUROLOGICAL EXAM    Constitutional: Well developed, well nourished and in no acute distress.        Respiratory: Clear to auscultation bilaterally with no use of accessory muscles during respiration. Cardiovascular:  No murmurs auscultated. Carotid arteries without bruits. Pedal pulses and radial pulses 2+ bilaterally. No edema in all four extremities. Mental Status:    Alert and oriented to person, place, and time. Recent and remote memory: Intact  Attention and concentration: Intact  Speech and language : Intact  Fund of knowledge: Intact  Judgement and Insight: Intact    Cranial Nerves:     II: Visual Acuity:        Visual Fields: Full to confrontation bilaterally        Direct Ophthalmoscopy: Optic discs sharp bilaterally with red reflex intact. III, IV, VI: Pupils: equally round and reactive to light, 3  to 2  mm bilaterally. EOMs: full with no nystagmus. V: Sensation intact to light touch and pin prick sensation bilaterally  VII: symmetric and strong in the upper and lower face billaterally  VIII: Hearing intact to finger rub bilaterally   IX,X: Palate elevates symmetrically. XI: head turn (sternocleidomastoid) and shoulder shrug (trapezius) 5/5 bilaterally   XII: Tongue is midline upon protrusion    Motor:   Normal tone and bulk. Normal fine finger movements. No pronator drift. No resting tremors, postural tremors or myoclonus. RUE 2-3/5      Sensory:  Intact light touch and pinprick in upper and lower extremities bilaterally. Intact proprioception and vibration sense in upper and lower extremities bilaterally. Coordination:   Alternating hand and foot movements intact in the UE and LE bilaterally  Finger-to-nose and Heel-to-shin with no ataxia or intention tremor bilaterally    Gait/Station:   Patient rises from a seated position without assistance. Romberg's test negative. Gait pattern is without hesitation, a wide-base, and of normal stride length. Heel, toe and tandem walking are intact.         Imaging  CT HEAD WO CONTRAST    Result Date: 1/17/2023  EXAMINATION: CT OF THE HEAD WITHOUT CONTRAST  1/17/2023 2:01 pm TECHNIQUE: CT of the head was performed without the administration of intravenous contrast. Automated exposure control, iterative reconstruction, and/or weight based adjustment of the mA/kV was utilized to reduce the radiation dose to as low as reasonably achievable. COMPARISON: None. HISTORY: ORDERING SYSTEM PROVIDED HISTORY: Numbness TECHNOLOGIST PROVIDED HISTORY: Reason for exam:->numbness to R arm What reading provider will be dictating this exam?->CRC FINDINGS: BRAIN/VENTRICLES: There is no acute intracranial hemorrhage, mass effect or midline shift. No abnormal extra-axial fluid collection. The gray-white differentiation is maintained without evidence of an acute infarct. There is no evidence of hydrocephalus. There are old lacunar infarcts seen within the right and left basal ganglia. There is no large area of encephalomalacia is seen within the right cerebral hemisphere. ORBITS: The visualized portion of the orbits demonstrate no acute abnormality. SINUSES: The visualized paranasal sinuses and mastoid air cells demonstrate no acute abnormality. SOFT TISSUES/SKULL:  No acute abnormality of the visualized skull or soft tissues. 1. There is no acute intracranial abnormality 2. Old lacunar infarcts within the right and left basal ganglia 3. There is no large area of encephalomalacia  seen within the right cerebral hemisphere.  .     IR LAY CATH PLACE COM CAROTID INTRACRANIAL LEFT W ANGIO    Result Date: 1/17/2023  IR LAY CATH PLACE COM CAROTID INTRACRANIAL LEFT W OR WO ANGIO, IR LAY CATH PLACE VERTEBRAL ART RIGHT W OR WO ANGIO, IR LAY CATH PLACE COM CAROTID INTRACRANIAL RIGHT W OR WO ANGIO PROCEDURE PERFORMED: Diagnostic cerebral angiogram SURGEON: Sae Rosales MD ASSISTANT : Susan SOMMER COMPLETED DATE:  1/17/2023 10:00 AM CLINICAL HISTORY/PRE-PROCEDURE DIAGNOSIS:  Patient with a history of right MCA stroke with a complex plaque in the right internal carotid artery status post successful thrombectomy has made a complete recovery. He is here for follow-up angiogram to recheck the carotid to see if she has a carotid web that was underlying etiology of stroke POST-PROCEDURE DIAGNOSIS: Please see below COMPARISON: Prior angiographic documentation prior CT angiogram October 2022 ANESTHESIA: Conscious sedation and local anesthesia VESSELS CATHETERIZED: 1. Left common carotid artery. 2. Right common carotid artery. 3. Right vertebral artery. RADIATION EXPOSURE DATA:  214.2 MG Y TOTAL FLUOROSCOPY TIME: 6 minutes and 6 seconds CONTRAST USED: 50 mL of Isovue-300 TECHNIQUE: After informed consent was obtained, the patient was brought into the angiography suite. Both the right wrist and the groin sites were prepped and draped in the usual sterile fashion. A 5 Ghanaian micropuncture kit needle was then used to access the right radial artery. A 5F sheath was them introduced and secured in place, and vasospastic cocktail was administered. A 5 Ghanaian JUSTICE catheter was then inserted, double flushed and connected to continuous heparinized saline. The diagnostic catheter was then used to selectively catheterize the above-mentioned vessels. Digital subtraction angiography was then performed after selective catheterization of each vessel. After satisfactory images were obtained, the diagnostic catheter was removed from the patient, and a 24 cm right radial compression device was applied and was inflated with 12 mL of air. The sheath was removed and hemostasis was achieved and checked. Patient was transferred in a stable condition. Patient's neurological exam is stable and has no neurological deficits on leaving the angio suite INTERPRETATION OF IMAGES: 1. Right common/ internal carotid artery injection: At the origin of the right internal carotid artery is a complex plaque noticed causing about 15-20% stenosis of the right internal carotid artery. This is nonflow limiting.  Appearance not exactly suggestive of a carotid web Intracranially, there was normal opacification of the right ophthalmic artery, right posterior communicating artery, right anterior choroidal artery, right middle cerebral artery and its branches and right anterior cerebral artery and its branches. Normal capillary phase and venous drainage was noted. No evidence of AVMs or dural AV fistulas. 2. Left common/internal carotid artery injection: Intracranially, there was normal opacification of the left ophthalmic artery, left posterior communicating artery, left anterior choroidal artery, left middle cerebral artery and its branches, and left anterior cerebral artery and its branches. The left posterior communicating artery appeared supplying the territory of the left posterior cerebral artery suggesting a fetal type posterior cerebral artery. Normal capillary phase and venous drainage was noted. No evidence of any aneurysms ,AVMs or dural AV fistulas. 3 Right Vertebral artery injection: The origin of the right vertebral artery appear to be free from any atherosclerotic disease. Suboptimal image quality due to biplane machine issues in the AP plane. Right vertebral artery appear to be normal in size, caliber and course. Intracranially, there was normal opacification of the right posterior inferior cerebellar artery, bilateral anterior-inferior cerebellar arteries, bilateral superior cerebellar arteries, bilateral posterior cerebral artery, basilar artery lumen and apex. Normal capillary phase and venous drainage was noted. No evidence of aneurysms, AVMs or dural AV fistulas. 1. Presence of a nonflow-limiting complex plaque at the origin of the right internal carotid artery causing about 20% stenosis per NASCET criteria 2. Otherwise normal diagnostic cerebral angiogram. RECOMMENDATIONS Recommend continuation of dual antiplatelet therapy for 6 months to one year Lifestyle modifications were discussed Patients:  If you have questions regarding some of the verbiage in your report, please visit [email or web address] for a definition. If you have any other questions, please contact your physician. Physicians: If you have questions regarding this report, please call:[facility]     IR LAY CATH PLACE COM CAROTID INTRACRANIAL RIGHT W ANGIO    Result Date: 1/17/2023  IR LAY CATH PLACE COM CAROTID INTRACRANIAL LEFT W OR WO ANGIO, IR LAY CATH PLACE VERTEBRAL ART RIGHT W OR WO ANGIO, IR LAY CATH PLACE COM CAROTID INTRACRANIAL RIGHT W OR WO ANGIO PROCEDURE PERFORMED: Diagnostic cerebral angiogram SURGEON: Oz Jacques MD ASSISTANT : Mohamud SOMMER COMPLETED DATE:  1/17/2023 10:00 AM CLINICAL HISTORY/PRE-PROCEDURE DIAGNOSIS:  Patient with a history of right MCA stroke with a complex plaque in the right internal carotid artery status post successful thrombectomy has made a complete recovery. He is here for follow-up angiogram to recheck the carotid to see if she has a carotid web that was underlying etiology of stroke POST-PROCEDURE DIAGNOSIS: Please see below COMPARISON: Prior angiographic documentation prior CT angiogram October 2022 ANESTHESIA: Conscious sedation and local anesthesia VESSELS CATHETERIZED: 1. Left common carotid artery. 2. Right common carotid artery. 3. Right vertebral artery. RADIATION EXPOSURE DATA:  214.2 MG Y TOTAL FLUOROSCOPY TIME: 6 minutes and 6 seconds CONTRAST USED: 50 mL of Isovue-300 TECHNIQUE: After informed consent was obtained, the patient was brought into the angiography suite. Both the right wrist and the groin sites were prepped and draped in the usual sterile fashion. A 5 [place]raltarian micropuncture kit needle was then used to access the right radial artery. A 5F sheath was them introduced and secured in place, and vasospastic cocktail was administered. A 5 [place]raltarian JUSTICE catheter was then inserted, double flushed and connected to continuous heparinized saline.  The diagnostic catheter was then used to selectively catheterize the above-mentioned vessels. Digital subtraction angiography was then performed after selective catheterization of each vessel. After satisfactory images were obtained, the diagnostic catheter was removed from the patient, and a 24 cm right radial compression device was applied and was inflated with 12 mL of air. The sheath was removed and hemostasis was achieved and checked. Patient was transferred in a stable condition. Patient's neurological exam is stable and has no neurological deficits on leaving the angio suite INTERPRETATION OF IMAGES: 1. Right common/ internal carotid artery injection: At the origin of the right internal carotid artery is a complex plaque noticed causing about 15-20% stenosis of the right internal carotid artery. This is nonflow limiting. Appearance not exactly suggestive of a carotid web Intracranially, there was normal opacification of the right ophthalmic artery, right posterior communicating artery, right anterior choroidal artery, right middle cerebral artery and its branches and right anterior cerebral artery and its branches. Normal capillary phase and venous drainage was noted. No evidence of AVMs or dural AV fistulas. 2. Left common/internal carotid artery injection: Intracranially, there was normal opacification of the left ophthalmic artery, left posterior communicating artery, left anterior choroidal artery, left middle cerebral artery and its branches, and left anterior cerebral artery and its branches. The left posterior communicating artery appeared supplying the territory of the left posterior cerebral artery suggesting a fetal type posterior cerebral artery. Normal capillary phase and venous drainage was noted. No evidence of any aneurysms ,AVMs or dural AV fistulas. 3 Right Vertebral artery injection: The origin of the right vertebral artery appear to be free from any atherosclerotic disease.  Suboptimal image quality due to biplane machine issues in the AP plane. Right vertebral artery appear to be normal in size, caliber and course. Intracranially, there was normal opacification of the right posterior inferior cerebellar artery, bilateral anterior-inferior cerebellar arteries, bilateral superior cerebellar arteries, bilateral posterior cerebral artery, basilar artery lumen and apex.  Normal capillary phase and venous drainage was noted. No evidence of aneurysms, AVMs or dural AV fistulas.     1. Presence of a nonflow-limiting complex plaque at the origin of the right internal carotid artery causing about 20% stenosis per NASCET criteria 2. Otherwise normal diagnostic cerebral angiogram. RECOMMENDATIONS Recommend continuation of dual antiplatelet therapy for 6 months to one year Lifestyle modifications were discussed Patients: If you have questions regarding some of the verbiage in your report, please visit [email or web address] for a definition.  If you have any other questions, please contact your physician. Physicians: If you have questions regarding this report, please call:[facility]     IR LAY CATH PLACE VERTEBRAL ART RIGHT W ANGIO    Result Date: 1/17/2023  IR LAY CATH PLACE COM CAROTID INTRACRANIAL LEFT W OR WO ANGIO, IR LAY CATH PLACE VERTEBRAL ART RIGHT W OR WO ANGIO, IR LAY CATH PLACE COM CAROTID INTRACRANIAL RIGHT W OR WO ANGIO PROCEDURE PERFORMED: Diagnostic cerebral angiogram SURGEON: Markell Mart MD ASSISTANT : Henry SOMMER COMPLETED DATE:  1/17/2023 10:00 AM CLINICAL HISTORY/PRE-PROCEDURE DIAGNOSIS:  Patient with a history of right MCA stroke with a complex plaque in the right internal carotid artery status post successful thrombectomy has made a complete recovery. He is here for follow-up angiogram to recheck the carotid to see if she has a carotid web that was underlying etiology of stroke POST-PROCEDURE DIAGNOSIS: Please see below COMPARISON: Prior angiographic documentation prior CT  angiogram October 2022 ANESTHESIA: Conscious sedation and local anesthesia VESSELS CATHETERIZED: 1. Left common carotid artery. 2. Right common carotid artery. 3. Right vertebral artery. RADIATION EXPOSURE DATA:  214.2 MG Y TOTAL FLUOROSCOPY TIME: 6 minutes and 6 seconds CONTRAST USED: 50 mL of Isovue-300 TECHNIQUE: After informed consent was obtained, the patient was brought into the angiography suite. Both the right wrist and the groin sites were prepped and draped in the usual sterile fashion. A 5 Albanian micropuncture kit needle was then used to access the right radial artery. A 5F sheath was them introduced and secured in place, and vasospastic cocktail was administered. A 5 Albanian JUSTICE catheter was then inserted, double flushed and connected to continuous heparinized saline. The diagnostic catheter was then used to selectively catheterize the above-mentioned vessels. Digital subtraction angiography was then performed after selective catheterization of each vessel. After satisfactory images were obtained, the diagnostic catheter was removed from the patient, and a 24 cm right radial compression device was applied and was inflated with 12 mL of air. The sheath was removed and hemostasis was achieved and checked. Patient was transferred in a stable condition. Patient's neurological exam is stable and has no neurological deficits on leaving the angio suite INTERPRETATION OF IMAGES: 1. Right common/ internal carotid artery injection: At the origin of the right internal carotid artery is a complex plaque noticed causing about 15-20% stenosis of the right internal carotid artery. This is nonflow limiting. Appearance not exactly suggestive of a carotid web Intracranially, there was normal opacification of the right ophthalmic artery, right posterior communicating artery, right anterior choroidal artery, right middle cerebral artery and its branches and right anterior cerebral artery and its branches. Normal capillary phase and venous drainage was noted. No evidence of AVMs or dural AV fistulas. 2. Left common/internal carotid artery injection: Intracranially, there was normal opacification of the left ophthalmic artery, left posterior communicating artery, left anterior choroidal artery, left middle cerebral artery and its branches, and left anterior cerebral artery and its branches. The left posterior communicating artery appeared supplying the territory of the left posterior cerebral artery suggesting a fetal type posterior cerebral artery. Normal capillary phase and venous drainage was noted. No evidence of any aneurysms ,AVMs or dural AV fistulas. 3 Right Vertebral artery injection: The origin of the right vertebral artery appear to be free from any atherosclerotic disease. Suboptimal image quality due to biplane machine issues in the AP plane. Right vertebral artery appear to be normal in size, caliber and course. Intracranially, there was normal opacification of the right posterior inferior cerebellar artery, bilateral anterior-inferior cerebellar arteries, bilateral superior cerebellar arteries, bilateral posterior cerebral artery, basilar artery lumen and apex. Normal capillary phase and venous drainage was noted. No evidence of aneurysms, AVMs or dural AV fistulas. 1. Presence of a nonflow-limiting complex plaque at the origin of the right internal carotid artery causing about 20% stenosis per NASCET criteria 2. Otherwise normal diagnostic cerebral angiogram. RECOMMENDATIONS Recommend continuation of dual antiplatelet therapy for 6 months to one year Lifestyle modifications were discussed Patients: If you have questions regarding some of the verbiage in your report, please visit [email or web address] for a definition. If you have any other questions, please contact your physician. Physicians:  If you have questions regarding this report, please call:[facility]

## 2023-01-19 LAB
ANION GAP SERPL CALCULATED.3IONS-SCNC: 16 MMOL/L (ref 7–16)
BUN BLDV-MCNC: 18 MG/DL (ref 6–20)
CALCIUM IONIZED: 1.33 MMOL/L (ref 1.15–1.33)
CALCIUM SERPL-MCNC: 9.7 MG/DL (ref 8.6–10.2)
CHLORIDE BLD-SCNC: 95 MMOL/L (ref 98–107)
CO2: 21 MMOL/L (ref 22–29)
CREAT SERPL-MCNC: 0.8 MG/DL (ref 0.5–1)
GFR SERPL CREATININE-BSD FRML MDRD: >60 ML/MIN/1.73
GLUCOSE BLD-MCNC: 107 MG/DL (ref 74–99)
HCT VFR BLD CALC: 24.5 % (ref 34–48)
HEMOGLOBIN: 7.8 G/DL (ref 11.5–15.5)
MAGNESIUM: 1.7 MG/DL (ref 1.6–2.6)
MCH RBC QN AUTO: 21.8 PG (ref 26–35)
MCHC RBC AUTO-ENTMCNC: 31.8 % (ref 32–34.5)
MCV RBC AUTO: 68.4 FL (ref 80–99.9)
PDW BLD-RTO: 16.1 FL (ref 11.5–15)
PHOSPHORUS: 4.1 MG/DL (ref 2.5–4.5)
PLATELET # BLD: 435 E9/L (ref 130–450)
PMV BLD AUTO: 8.5 FL (ref 7–12)
POTASSIUM SERPL-SCNC: 3.8 MMOL/L (ref 3.5–5)
RBC # BLD: 3.58 E12/L (ref 3.5–5.5)
SODIUM BLD-SCNC: 132 MMOL/L (ref 132–146)
WBC # BLD: 9.9 E9/L (ref 4.5–11.5)

## 2023-01-19 PROCEDURE — 2580000003 HC RX 258: Performed by: NURSE PRACTITIONER

## 2023-01-19 PROCEDURE — 6370000000 HC RX 637 (ALT 250 FOR IP): Performed by: PSYCHIATRY & NEUROLOGY

## 2023-01-19 PROCEDURE — 6360000002 HC RX W HCPCS: Performed by: PSYCHIATRY & NEUROLOGY

## 2023-01-19 PROCEDURE — 6370000000 HC RX 637 (ALT 250 FOR IP): Performed by: NURSE PRACTITIONER

## 2023-01-19 PROCEDURE — 2580000003 HC RX 258: Performed by: PSYCHIATRY & NEUROLOGY

## 2023-01-19 PROCEDURE — 2060000000 HC ICU INTERMEDIATE R&B

## 2023-01-19 PROCEDURE — 83735 ASSAY OF MAGNESIUM: CPT

## 2023-01-19 PROCEDURE — 84100 ASSAY OF PHOSPHORUS: CPT

## 2023-01-19 PROCEDURE — 80048 BASIC METABOLIC PNL TOTAL CA: CPT

## 2023-01-19 PROCEDURE — 6360000002 HC RX W HCPCS: Performed by: CLINICAL NURSE SPECIALIST

## 2023-01-19 PROCEDURE — 6360000002 HC RX W HCPCS: Performed by: NURSE PRACTITIONER

## 2023-01-19 PROCEDURE — 82330 ASSAY OF CALCIUM: CPT

## 2023-01-19 PROCEDURE — 85027 COMPLETE CBC AUTOMATED: CPT

## 2023-01-19 RX ORDER — BACLOFEN 10 MG/1
5 TABLET ORAL 3 TIMES DAILY
Status: DISCONTINUED | OUTPATIENT
Start: 2023-01-19 | End: 2023-01-20 | Stop reason: HOSPADM

## 2023-01-19 RX ORDER — ASPIRIN 325 MG
325 TABLET ORAL DAILY
Status: DISCONTINUED | OUTPATIENT
Start: 2023-01-20 | End: 2023-01-20 | Stop reason: HOSPADM

## 2023-01-19 RX ORDER — MAGNESIUM SULFATE IN WATER 40 MG/ML
2000 INJECTION, SOLUTION INTRAVENOUS ONCE
Status: COMPLETED | OUTPATIENT
Start: 2023-01-19 | End: 2023-01-19

## 2023-01-19 RX ORDER — EZETIMIBE 10 MG/1
10 TABLET ORAL NIGHTLY
Status: DISCONTINUED | OUTPATIENT
Start: 2023-01-19 | End: 2023-01-20 | Stop reason: HOSPADM

## 2023-01-19 RX ORDER — MIDAZOLAM HYDROCHLORIDE 2 MG/2ML
1 INJECTION, SOLUTION INTRAMUSCULAR; INTRAVENOUS EVERY 4 HOURS PRN
Status: DISCONTINUED | OUTPATIENT
Start: 2023-01-19 | End: 2023-01-20 | Stop reason: HOSPADM

## 2023-01-19 RX ADMIN — MAGNESIUM SULFATE HEPTAHYDRATE 2000 MG: 40 INJECTION, SOLUTION INTRAVENOUS at 09:18

## 2023-01-19 RX ADMIN — MIDAZOLAM 1 MG: 1 INJECTION INTRAMUSCULAR; INTRAVENOUS at 14:52

## 2023-01-19 RX ADMIN — LOSARTAN POTASSIUM 50 MG: 50 TABLET, FILM COATED ORAL at 09:11

## 2023-01-19 RX ADMIN — SODIUM CHLORIDE, PRESERVATIVE FREE 10 ML: 5 INJECTION INTRAVENOUS at 09:11

## 2023-01-19 RX ADMIN — TRAMADOL HYDROCHLORIDE 50 MG: 50 TABLET ORAL at 00:30

## 2023-01-19 RX ADMIN — Medication 100 MG: at 09:11

## 2023-01-19 RX ADMIN — BACLOFEN 5 MG: 10 TABLET ORAL at 20:20

## 2023-01-19 RX ADMIN — DICLOFENAC SODIUM 2 G: 10 GEL TOPICAL at 20:19

## 2023-01-19 RX ADMIN — BACLOFEN 5 MG: 10 TABLET ORAL at 14:51

## 2023-01-19 RX ADMIN — AMLODIPINE BESYLATE 5 MG: 5 TABLET ORAL at 09:10

## 2023-01-19 RX ADMIN — FOLIC ACID 1 MG: 1 TABLET ORAL at 09:11

## 2023-01-19 RX ADMIN — TRAMADOL HYDROCHLORIDE 50 MG: 50 TABLET ORAL at 11:28

## 2023-01-19 RX ADMIN — DICLOFENAC SODIUM 2 G: 10 GEL TOPICAL at 09:11

## 2023-01-19 RX ADMIN — MIDAZOLAM 2 MG: 1 INJECTION INTRAMUSCULAR; INTRAVENOUS at 02:22

## 2023-01-19 RX ADMIN — SODIUM CHLORIDE, PRESERVATIVE FREE 10 ML: 5 INJECTION INTRAVENOUS at 20:20

## 2023-01-19 RX ADMIN — ASPIRIN 81 MG CHEWABLE TABLET 81 MG: 81 TABLET CHEWABLE at 09:11

## 2023-01-19 RX ADMIN — ACETAMINOPHEN 650 MG: 325 TABLET ORAL at 21:59

## 2023-01-19 RX ADMIN — ATORVASTATIN CALCIUM 80 MG: 40 TABLET, FILM COATED ORAL at 20:19

## 2023-01-19 RX ADMIN — SODIUM CHLORIDE, PRESERVATIVE FREE 10 ML: 5 INJECTION INTRAVENOUS at 09:12

## 2023-01-19 RX ADMIN — CYCLOBENZAPRINE 5 MG: 10 TABLET, FILM COATED ORAL at 00:29

## 2023-01-19 RX ADMIN — HYDROCHLOROTHIAZIDE 12.5 MG: 25 TABLET ORAL at 09:11

## 2023-01-19 RX ADMIN — CLOPIDOGREL BISULFATE 75 MG: 75 TABLET ORAL at 09:10

## 2023-01-19 RX ADMIN — EZETIMIBE 10 MG: 10 TABLET ORAL at 20:20

## 2023-01-19 ASSESSMENT — PAIN DESCRIPTION - ORIENTATION
ORIENTATION: ANTERIOR;MID
ORIENTATION: RIGHT
ORIENTATION: RIGHT

## 2023-01-19 ASSESSMENT — PAIN SCALES - GENERAL
PAINLEVEL_OUTOF10: 8
PAINLEVEL_OUTOF10: 0
PAINLEVEL_OUTOF10: 5
PAINLEVEL_OUTOF10: 8
PAINLEVEL_OUTOF10: 10
PAINLEVEL_OUTOF10: 8
PAINLEVEL_OUTOF10: 8

## 2023-01-19 ASSESSMENT — PAIN DESCRIPTION - LOCATION
LOCATION: HEAD
LOCATION: SHOULDER
LOCATION: SHOULDER

## 2023-01-19 ASSESSMENT — PAIN DESCRIPTION - DESCRIPTORS
DESCRIPTORS: ACHING
DESCRIPTORS: SHARP
DESCRIPTORS: ACHING;DISCOMFORT;THROBBING

## 2023-01-19 ASSESSMENT — PAIN - FUNCTIONAL ASSESSMENT: PAIN_FUNCTIONAL_ASSESSMENT: PREVENTS OR INTERFERES SOME ACTIVE ACTIVITIES AND ADLS

## 2023-01-19 NOTE — PLAN OF CARE
Problem: Chronic Conditions and Co-morbidities  Goal: Patient's chronic conditions and co-morbidity symptoms are monitored and maintained or improved  Outcome: Progressing     Problem: Discharge Planning  Goal: Discharge to home or other facility with appropriate resources  1/19/2023 1041 by Angelica Parker RN  Outcome: Progressing  1/19/2023 0248 by Win Dunne  Outcome: Progressing     Problem: Safety - Adult  Goal: Free from fall injury  Outcome: Progressing     Problem: Neurosensory - Adult  Goal: Achieves stable or improved neurological status  1/19/2023 1041 by Angelica Parker RN  Outcome: Progressing  Flowsheets (Taken 1/19/2023 1041)  Achieves stable or improved neurological status:   Assess for and report changes in neurological status   Maintain blood pressure and fluid volume within ordered parameters to optimize cerebral perfusion and minimize risk of hemorrhage  1/19/2023 0248 by Win Dunne  Outcome: Progressing     Problem: Pain  Goal: Verbalizes/displays adequate comfort level or baseline comfort level  1/19/2023 1041 by Angelica Parker RN  Outcome: Progressing  Flowsheets (Taken 1/19/2023 1041)  Verbalizes/displays adequate comfort level or baseline comfort level:   Encourage patient to monitor pain and request assistance   Assess pain using appropriate pain scale   Administer analgesics based on type and severity of pain and evaluate response   Implement non-pharmacological measures as appropriate and evaluate response  1/19/2023 0248 by Win Dunne  Outcome: Progressing

## 2023-01-19 NOTE — PROGRESS NOTES
STROKE NEUROLOGY FOLLOW UP      Impression:  #1  Acute ischemic Stroke s/p TNK   #2 H/o Recurrent strokes with recent stroke in the Right MCA oct 22 s/p thrombectomy and full recovery  #3 Brachiocephalic and subclavian plaque  #4 Right common carotid complex plaque      Plan:    MRI brain stroke confirmed  MRA- shows no vessel occlusion   MR C spine shows some narrowing mutlilevel        Increase ASA to 325 mg  Continue Plavix 75 mg daily     Continue Lipitor 40 mg QHS  Start Zetia    Baclofen for muscle spasm      Interval History:    Patient was seen and examined this AM.   Still experiencing Right arm weakness. Reports still having spasms  Some antigravity movement noticed        REVIEW OF SYSTEMS:    Constitutional: Denies fever, weight loss, fatigue and night sweats. Neurological: Right arm weakness+  Psychiatric: Denies anxiety, depression and hallucinations. Eyes: Denies blurred vision, double vision and eye pain. ENMT: Denies difficulty swallowing, dental pain, hearing loss, and tinnitus. Cardiovascular: Denies chest pain and palpitations. Respiratory: Denies shortness of breath. Genitourinary: Denies urinary incontinence and retention. Integumentary: Denies rashes. Endocrine: Denies polydipsia and polyuria.     Social History  Social History       Tobacco History       Smoking Status  Former Quit Date  7/20/2011 Smoking Frequency  2.00 packs/day for 6.00 years (12.00 pk-yrs) Smoking Tobacco Type  Cigarettes quit in 7/20/2011      Smokeless Tobacco Use  Never              Alcohol History       Alcohol Use Status  Not Currently Drinks/Week  42 Cans of beer per week Amount  42.0 standard drinks of alcohol/wk Comment  quit 1 month ago              Drug Use       Drug Use Status  No              Sexual Activity       Sexually Active  Yes                    Tobacco Use  Tobacco Use: Medium Risk    Smoking Tobacco Use: Former    Smokeless Tobacco Use: Never    Passive Exposure: Not on file Substance Use Topics  Denies to me at my eval    NEUROLOGICAL EXAM    Constitutional: Well developed, well nourished and in no acute distress. Respiratory: Clear to auscultation bilaterally with no use of accessory muscles during respiration. Cardiovascular:  No murmurs auscultated. Carotid arteries without bruits. Pedal pulses and radial pulses 2+ bilaterally. No edema in all four extremities. Mental Status:    Alert and oriented to person, place, and time. Recent and remote memory: Intact  Attention and concentration: Intact  Speech and language : Intact  Fund of knowledge: Intact  Judgement and Insight: Intact    Cranial Nerves:     II: Visual Acuity:        Visual Fields: Full to confrontation bilaterally        Direct Ophthalmoscopy: Optic discs sharp bilaterally with red reflex intact. III, IV, VI: Pupils: equally round and reactive to light, 3  to 2  mm bilaterally. EOMs: full with no nystagmus. V: Sensation intact to light touch and pin prick sensation bilaterally  VII: symmetric and strong in the upper and lower face billaterally  VIII: Hearing intact to finger rub bilaterally   IX,X: Palate elevates symmetrically. XI: head turn (sternocleidomastoid) and shoulder shrug (trapezius) 5/5 bilaterally   XII: Tongue is midline upon protrusion    Motor:   Normal tone and bulk. Normal fine finger movements. No pronator drift. No resting tremors, postural tremors or myoclonus. RUE 2/5  LUE 5/5  LLE 5/5  RLE 5/5    Sensory:  Intact light touch and pinprick in upper and lower extremities bilaterally. Intact proprioception and vibration sense in upper and lower extremities bilaterally. Coordination:   Alternating hand and foot movements intact in the UE and LE bilaterally  Finger-to-nose and Heel-to-shin with no ataxia or intention tremor bilaterally    Gait/Station:   Patient rises from a seated position without assistance.          Imaging  Reviewed

## 2023-01-19 NOTE — PLAN OF CARE
Problem: Neurosensory - Adult  Goal: Achieves stable or improved neurological status  Outcome: Progressing     Problem: Discharge Planning  Goal: Discharge to home or other facility with appropriate resources  Outcome: Progressing     Problem: Pain  Goal: Verbalizes/displays adequate comfort level or baseline comfort level  Outcome: Progressing

## 2023-01-19 NOTE — PROGRESS NOTES
Neuro Science Intensive Care Unit  Critical Care  Daily Progress Note 1/19/2023    Date of Admission: 01/17/2023    CC: Follow up for stroke    HOSPITAL COURSE/OVERNIGHT EVENTS:    65year old female presented to hospital for elective diagnostic cerebral angiogram with Dr. Keisha Cross. About 1 hour after the procedure, she developed right arm weakness and uncontrolled movement. She received TNK for possible ischemic CVA. She was admitted to [facility]. On exam, she is alert and oriented but agitated. She is complaining of no control of her right arm; no other extremities are affected. She continued to act anxious, unable to sit still, moving from chair to bed, standing up and immediately sitting back down. She has history of alcohol abuse but states she is not drinking any longer. She was given versed 2mg. She was helped back to bed and is now calm. 1/18 no events overnight. Required PRN x1 of versed. CT and MRI pending. HCT showed acute non hemorrhagic infarcts in left parietal lobe & right cerebellum. 1/19 No issues overnight. Stated did not get any sleep last night. Did not require any prn antihypertensive agents. PHYSICAL EXAM:   /60   Pulse 80   Temp 97 °F (36.1 °C) (Temporal)   Resp 14   Ht 5' 8\" (1.727 m)   Wt 192 lb (87.1 kg)   SpO2 94%   BMI 29.19 kg/m²     Intake/Output Summary (Last 24 hours) at 1/19/2023 0749  Last data filed at 1/18/2023 1200  Gross per 24 hour   Intake 480 ml   Output --   Net 480 ml      General appearance:  Comfortable. Pain Description: none    NEUROLOGIC:   RASS Score:  0  GCS:  15  4 - Opens eyes on own   6 - Follows simple motor commands  5 - Alert and oriented       Pupil size:  Left 3 mm  Right 3 mm  Pupil reaction: Yes   PERRLA  Wiggles fingers: Left   Yes  Right No  Hand grasp:   Left: Yes     Right    No  Wiggles toes: Left   Yes Right  Yes  Plantar flexion: Left  Yes  Right   No  Facial droop:   none   Speech:  no dysarthria.       CONSTITUTIONAL: No acute distress, lying in hospital bed. CARDIOVASCULAR: S1 S2, regular rate, regular rhythm, no murmur/gallop/rub. Monitor: NSR. PULMONARY: Bilaterally clear. No rhonchi/rales/wheezes, no use of accessory muscles. Room air. RENAL:  Voids. Fluid balance for previous 24 hours:  + 480 ml. ABDOMEN: Soft, nontender, nondistended, nontympanic, normal bowel sounds. Diet:  General.  No reported nausea or vomiting. Last BM:  PTA. MUSCULOSKELETAL:   Right upper extremity weakness. SKIN/EXTREMITIES: No rashes/ecchymosis, no edema/clubbing, warm/dry, good capillary refill. LINES:  Peripheral     Recent Labs     01/17/23  0906 01/18/23  0502 01/19/23  0410   WBC 4.4* 5.7 9.9   HGB 8.6* 7.6* 7.8*   HCT 28.5* 24.5* 24.5*   MCV 73.3* 71.6* 68.4*    345 435       Recent Labs     01/17/23  0906 01/18/23  0502 01/19/23  0410    137 132   K 3.7 3.9 3.8   CO2 25 24 21*   PHOS  --  3.9 4.1   BUN 17 12 18   CREATININE 1.0 0.8 0.8     Recent Labs     01/17/23  0906   INR 1.0     ASSESSMENT/PLAN:     Principal Problem:    Stroke-like episode  Active Problems:    Acute ischemic cerebrovascular accident (CVA) involving internal carotid artery [facility])  Resolved Problems:    * No resolved hospital problems. *    Neuro: Acute left parietal ischemic stroke. Right cerebellar ischemic stroke. Monitor neuro status. Neurology following. Stroke education. Stroke protocol. Speech therapy for cognitive evaluation. CV:  No acute issues. Hx of HTN, Hyperlipidemia, and CAD    Monitor hemodynamics. BP goal systolic less than 417 mm Hg. PRN hydralzine & labetalol. Statin. ASA. Plavix  Losartan. HCTZ  Norvasc. Pulm: No acute issues. Hx of asthma. Monitor RR & SpO2. O2 as needed. Albuterol PRN. Encourage cough, SMI  & deep breathing. GI: No acute issues. BMI 29. Monitor bowel function. Diet:       Zofran. Bowel regime. Renal:  No acute issues. Monitor BUN & Cr, electrolytes & replace as needed. Monitor I & O.    Voids. ID: No acute issues. Endocrine: No acute issues. Monitor BS.    MSK: Right sided weakness. Deconditioned.     ROM. Turn & reposition. PT & OT [facility] 18/24  Monitor for skin breakdown. Heme: No acute issues. Monitor CBC. Bowel regime: Senna and Glycolax  Pain control/Sedation: Baclofen. Tramadol. Flexeril. Versed. Tylenol. DVT prophylaxis: SCD  GI prophylaxis: Diet  Admitting/Consults:  Medicine, Neurology, and Critical care  Patient/Family update: Will update when available. Code status: Full code    Disposition: Transfer to telemetry    Total time for caring for this patient, including direct patient contact, review of data including imaging & laboratory studies, discussions with other team members & physicians at least 30 minutes so far today. Please feel free to call with questions or concerns.       Electronically signed by Arabella Peace RN MSN APRN-NP [facility] NP  CCNS CCRN 1/19/2023 7:48 AM

## 2023-01-19 NOTE — PROGRESS NOTES
Speech Language Pathology      NAME:  Jacquelin Arreguin  :  1965  DATE: 2023  ROOM:  [facility]    Pt sleeping, RN reported she hadn't slept all night. Chart reviewed, in addition to Pt/  reporting no cognitive concerns, OT also reports good cognitive skills upon their assessment.    SLP to sign off. Please re-consult as warranted.     Stroke-like episode [R29.90]

## 2023-01-19 NOTE — CARE COORDINATION
1/19/23 Update CM note PT here for stroke-like symptoms following IR procedure  for elective diagnostic cerebral angiogram.  24 hour CT negative for stroke or bleed  Can start ASA and Plavix. MRI from 1/18/23 resulted and on chart. No discharge needs identified at this time, plan to [place] [facility] [place].   Yuri DÍAZN, RN-BC

## 2023-01-20 ENCOUNTER — TELEPHONE (OUTPATIENT)
Dept: NEUROLOGY | Age: 58
End: 2023-01-20

## 2023-01-20 VITALS
WEIGHT: 192 LBS | OXYGEN SATURATION: 96 % | HEART RATE: 86 BPM | BODY MASS INDEX: 29.1 KG/M2 | SYSTOLIC BLOOD PRESSURE: 142 MMHG | RESPIRATION RATE: 18 BRPM | DIASTOLIC BLOOD PRESSURE: 77 MMHG | TEMPERATURE: 99.1 F | HEIGHT: 68 IN

## 2023-01-20 DIAGNOSIS — I69.398 PAIN AFTER CEREBROVASCULAR ACCIDENT (CVA): Primary | ICD-10-CM

## 2023-01-20 DIAGNOSIS — R52 PAIN AFTER CEREBROVASCULAR ACCIDENT (CVA): Primary | ICD-10-CM

## 2023-01-20 PROCEDURE — 6370000000 HC RX 637 (ALT 250 FOR IP): Performed by: NURSE PRACTITIONER

## 2023-01-20 PROCEDURE — 6360000002 HC RX W HCPCS: Performed by: PSYCHIATRY & NEUROLOGY

## 2023-01-20 PROCEDURE — 6370000000 HC RX 637 (ALT 250 FOR IP): Performed by: PSYCHIATRY & NEUROLOGY

## 2023-01-20 PROCEDURE — 2580000003 HC RX 258: Performed by: NURSE PRACTITIONER

## 2023-01-20 RX ORDER — EZETIMIBE 10 MG/1
10 TABLET ORAL NIGHTLY
Qty: 30 TABLET | Refills: 3 | Status: SHIPPED | OUTPATIENT
Start: 2023-01-20

## 2023-01-20 RX ORDER — BACLOFEN 5 MG/1
5 TABLET ORAL 3 TIMES DAILY
Qty: 90 TABLET | Refills: 2 | Status: SHIPPED | OUTPATIENT
Start: 2023-01-20 | End: 2023-04-20

## 2023-01-20 RX ORDER — ASPIRIN 325 MG
325 TABLET ORAL DAILY
Qty: 30 TABLET | Refills: 3 | Status: SHIPPED | OUTPATIENT
Start: 2023-01-20

## 2023-01-20 RX ORDER — TRAMADOL HYDROCHLORIDE 50 MG/1
50 TABLET ORAL EVERY 6 HOURS PRN
Qty: 12 TABLET | Refills: 0 | Status: SHIPPED
Start: 2023-01-20 | End: 2023-01-20 | Stop reason: HOSPADM

## 2023-01-20 RX ADMIN — SODIUM CHLORIDE, PRESERVATIVE FREE 10 ML: 5 INJECTION INTRAVENOUS at 09:01

## 2023-01-20 RX ADMIN — MIDAZOLAM 1 MG: 1 INJECTION INTRAMUSCULAR; INTRAVENOUS at 00:32

## 2023-01-20 RX ADMIN — DICLOFENAC SODIUM 2 G: 10 GEL TOPICAL at 08:58

## 2023-01-20 RX ADMIN — HYDROCHLOROTHIAZIDE 12.5 MG: 25 TABLET ORAL at 08:59

## 2023-01-20 RX ADMIN — Medication 100 MG: at 08:59

## 2023-01-20 RX ADMIN — BACLOFEN 5 MG: 10 TABLET ORAL at 08:59

## 2023-01-20 RX ADMIN — CLOPIDOGREL BISULFATE 75 MG: 75 TABLET ORAL at 09:00

## 2023-01-20 RX ADMIN — FOLIC ACID 1 MG: 1 TABLET ORAL at 09:00

## 2023-01-20 RX ADMIN — AMLODIPINE BESYLATE 5 MG: 5 TABLET ORAL at 09:00

## 2023-01-20 RX ADMIN — LOSARTAN POTASSIUM 50 MG: 50 TABLET, FILM COATED ORAL at 08:59

## 2023-01-20 RX ADMIN — ASPIRIN 325 MG: 325 TABLET ORAL at 09:00

## 2023-01-20 ASSESSMENT — PAIN DESCRIPTION - ORIENTATION: ORIENTATION: RIGHT

## 2023-01-20 ASSESSMENT — PAIN DESCRIPTION - LOCATION: LOCATION: ARM

## 2023-01-20 ASSESSMENT — PAIN SCALES - GENERAL: PAINLEVEL_OUTOF10: 7

## 2023-01-20 NOTE — DISCHARGE SUMMARY
[address] [facility]  Department of Neuroendovascular surgery  Discharge Summary      Hua Lenz  [id number]  1/20/2023  65 y.o. female    Date of Admission: 1/18/23   Date of Discharge:1/20/23    Discharge Diagnosis:  Embolic stroke s/p TNK on 1/18/23  2. HTN  3. HLD    Attending: Christophe Lovelace    Consults:ICU   Procedures:Diagnostic cerebral angiogram  Hospital Course:  Patient came in for an elective diagnostic cerebral angiogram.  Issues with image quality quality of the biplane machine with less than satisfactory live fluoroscopy. Following the angiogram an hour later patient developed right arm weakness and subsequently was evaluated initially shoulder weakness and was attributed to shoulder pain however a CT of the head was completed and TNKase was offered after much debate patient accepted TN K received TNKase and did well was assessed by therapy and she is able to walk independently has antigravity movement of the right upper extremity still waxes and wanes. She wishes to go home. Hence was discharged home in stable condition      Discharge Exam  Vitals:    01/20/23 1149   BP: (!) 142/77   Pulse: 86   Resp: 18   Temp: 99.1 °F (37.3 °C)   SpO2: 96%     Alert and oriented x 3  Lungs clear  Abdomen soft  NEUROLOGICAL EXAM     Constitutional: Well developed, well nourished and in no acute distress. Respiratory: Clear to auscultation bilaterally with no use of accessory muscles during respiration. Cardiovascular:  No murmurs auscultated. Carotid arteries without bruits. Pedal pulses and radial pulses 2+ bilaterally. No edema in all four extremities. Mental Status:     Alert and oriented to person, place, and time.   Recent and remote memory: Intact  Attention and concentration: Intact  Speech and language : Intact  Fund of knowledge: Intact  Judgement and Insight: Intact     Cranial Nerves:      II: Visual Acuity:               Visual Fields: Full to confrontation bilaterally Direct Ophthalmoscopy: Optic discs sharp bilaterally with red reflex intact. III, IV, VI: Pupils: equally round and reactive to light, 3  to 2  mm bilaterally. EOMs: full with no nystagmus. V: Sensation intact to light touch and pin prick sensation bilaterally  VII: symmetric and strong in the upper and lower face billaterally  VIII: Hearing intact to finger rub bilaterally   IX,X: Palate elevates symmetrically. XI: head turn (sternocleidomastoid) and shoulder shrug (trapezius) 5/5 bilaterally   XII: Tongue is midline upon protrusion     Motor:   Normal tone and bulk. Normal fine finger movements. No pronator drift. No resting tremors, postural tremors or myoclonus. RUE 2/5  LUE 5/5  LLE 5/5  RLE 5/5     Sensory:  Intact light touch and pinprick in upper and lower extremities bilaterally. Intact proprioception and vibration sense in upper and lower extremities bilaterally. Coordination:     Alternating hand and foot movements intact in the UE and LE bilaterally  Finger-to-nose and Heel-to-shin with no ataxia or intention tremor bilaterally     Gait/Station:   Patient rises from a seated position without assistance. Discharge Condition: stable  Discharge Medications:IV hydration and analgesia tramadol,. TNK , VErsed  Disposition:home  Patient Instructions: Follow up with me in 3 weeks  Diet: Recommend DASH diet  Activity: activity as tolerated  Exercise: As tolerated  Drive with seat belt on.     Shaquille Mena MD  1/20/2023    Shaquille Mena

## 2023-01-23 DIAGNOSIS — R29.90 STROKE-LIKE EPISODE: Primary | ICD-10-CM

## 2023-01-23 DIAGNOSIS — I63.9 ACUTE CEREBROVASCULAR ACCIDENT (CVA) (HCC): ICD-10-CM

## 2023-03-17 ENCOUNTER — OFFICE VISIT (OUTPATIENT)
Dept: NEUROLOGY | Age: 58
End: 2023-03-17
Payer: MEDICARE

## 2023-03-17 VITALS
BODY MASS INDEX: 28.89 KG/M2 | HEART RATE: 64 BPM | TEMPERATURE: 98.2 F | WEIGHT: 190 LBS | SYSTOLIC BLOOD PRESSURE: 133 MMHG | OXYGEN SATURATION: 96 % | DIASTOLIC BLOOD PRESSURE: 81 MMHG

## 2023-03-17 DIAGNOSIS — I73.9 PERIPHERAL VASCULAR DISEASE (HCC): ICD-10-CM

## 2023-03-17 DIAGNOSIS — I63.239: ICD-10-CM

## 2023-03-17 DIAGNOSIS — I69.30 CHRONIC ISCHEMIC LEFT MCA STROKE: ICD-10-CM

## 2023-03-17 DIAGNOSIS — I63.9 ACUTE CEREBROVASCULAR ACCIDENT (CVA) (HCC): Primary | ICD-10-CM

## 2023-03-17 DIAGNOSIS — I63.239 CAROTID ARTERY STENOSIS WITH CEREBRAL INFARCTION (HCC): ICD-10-CM

## 2023-03-17 PROCEDURE — 99213 OFFICE O/P EST LOW 20 MIN: CPT | Performed by: PSYCHIATRY & NEUROLOGY

## 2023-03-23 NOTE — PROGRESS NOTES
pattern is without hesitation, a wide-base, and of normal stride length. Heel, toe and tandem walking are intact.     Laboratory/Radiology:     CBC:   Lab Results   Component Value Date/Time    WBC 9.9 01/19/2023 04:10 AM    RBC 3.58 01/19/2023 04:10 AM    HGB 7.8 01/19/2023 04:10 AM    HCT 24.5 01/19/2023 04:10 AM    MCV 68.4 01/19/2023 04:10 AM    MCH 21.8 01/19/2023 04:10 AM    MCHC 31.8 01/19/2023 04:10 AM    RDW 16.1 01/19/2023 04:10 AM     01/19/2023 04:10 AM    MPV 8.5 01/19/2023 04:10 AM     CBC with Differential:    Lab Results   Component Value Date/Time    WBC 9.9 01/19/2023 04:10 AM    RBC 3.58 01/19/2023 04:10 AM    HGB 7.8 01/19/2023 04:10 AM    HCT 24.5 01/19/2023 04:10 AM     01/19/2023 04:10 AM    MCV 68.4 01/19/2023 04:10 AM    MCH 21.8 01/19/2023 04:10 AM    MCHC 31.8 01/19/2023 04:10 AM    RDW 16.1 01/19/2023 04:10 AM    SEGSPCT 72 05/24/2011 06:55 AM    LYMPHOPCT 31.3 01/17/2023 09:06 AM    MONOPCT 13.3 01/17/2023 09:06 AM    BASOPCT 1.1 01/17/2023 09:06 AM    MONOSABS 0.58 01/17/2023 09:06 AM    LYMPHSABS 1.36 01/17/2023 09:06 AM    EOSABS 0.19 01/17/2023 09:06 AM    BASOSABS 0.05 01/17/2023 09:06 AM     WBC:    Lab Results   Component Value Date/Time    WBC 9.9 01/19/2023 04:10 AM     Platelets:    Lab Results   Component Value Date/Time     01/19/2023 04:10 AM     Hemoglobin/Hematocrit:    Lab Results   Component Value Date/Time    HGB 7.8 01/19/2023 04:10 AM    HCT 24.5 01/19/2023 04:10 AM     CMP:    Lab Results   Component Value Date/Time     01/19/2023 04:10 AM    K 3.8 01/19/2023 04:10 AM    K 3.6 12/17/2022 10:39 PM    CL 95 01/19/2023 04:10 AM    CO2 21 01/19/2023 04:10 AM    BUN 18 01/19/2023 04:10 AM    CREATININE 0.8 01/19/2023 04:10 AM    GFRAA >60 07/27/2021 04:41 PM    LABGLOM >60 01/19/2023 04:10 AM    GLUCOSE 107 01/19/2023 04:10 AM    GLUCOSE 90 05/25/2011 05:25 AM    PROT 7.5 12/17/2022 10:39 PM    LABALBU 4.7 12/17/2022 10:39 PM    LABALBU 5.1

## 2023-04-18 ENCOUNTER — TELEPHONE (OUTPATIENT)
Dept: NEUROLOGY | Age: 58
End: 2023-04-18

## 2023-04-18 RX ORDER — EZETIMIBE 10 MG/1
10 TABLET ORAL NIGHTLY
Qty: 30 TABLET | Refills: 11 | Status: SHIPPED | OUTPATIENT
Start: 2023-04-18 | End: 2024-04-17

## 2023-04-18 RX ORDER — BACLOFEN 5 MG/1
5 TABLET ORAL 3 TIMES DAILY
Qty: 90 TABLET | Refills: 11 | Status: SHIPPED | OUTPATIENT
Start: 2023-04-18 | End: 2024-04-17

## 2023-04-27 ENCOUNTER — FOLLOWUP TELEPHONE ENCOUNTER (OUTPATIENT)
Dept: INPATIENT UNIT | Age: 58
End: 2023-04-27

## 2023-05-10 ENCOUNTER — TELEPHONE (OUTPATIENT)
Dept: NEUROLOGY | Age: 58
End: 2023-05-10

## 2023-05-10 NOTE — TELEPHONE ENCOUNTER
Patient called regarding she is having cramping in her legs at night when sleeping. She is questioning if she can take magnesium or if there is something she can take for the cramps.

## 2023-05-11 NOTE — TELEPHONE ENCOUNTER
Called and Left VM  Try magnesium for 4-5 days 200 mg twice OTC pills    If it does not help call again and we can discuss other options but they will come with side effects

## 2023-05-25 ENCOUNTER — TELEPHONE (OUTPATIENT)
Dept: NEUROLOGY | Age: 58
End: 2023-05-25

## 2023-05-25 NOTE — TELEPHONE ENCOUNTER
Yovany London called and asked me to check with Dr. Abelardo Ramirez if he was ok with the 80mg Kenalog injection she got today for her shoulder. I checked with him and he indicated that he was ok with it and I notified Yovany London.

## 2023-05-27 ENCOUNTER — APPOINTMENT (OUTPATIENT)
Dept: CT IMAGING | Age: 58
End: 2023-05-27
Payer: MEDICARE

## 2023-05-27 ENCOUNTER — HOSPITAL ENCOUNTER (EMERGENCY)
Age: 58
Discharge: HOME | End: 2023-05-27
Attending: EMERGENCY MEDICINE
Payer: MEDICARE

## 2023-05-27 VITALS
OXYGEN SATURATION: 97 % | DIASTOLIC BLOOD PRESSURE: 88 MMHG | TEMPERATURE: 98.2 F | HEART RATE: 82 BPM | RESPIRATION RATE: 16 BRPM | BODY MASS INDEX: 30.41 KG/M2 | WEIGHT: 200 LBS | SYSTOLIC BLOOD PRESSURE: 138 MMHG

## 2023-05-27 DIAGNOSIS — N23 RENAL COLIC: Primary | ICD-10-CM

## 2023-05-27 DIAGNOSIS — D64.9 ANEMIA, UNSPECIFIED TYPE: ICD-10-CM

## 2023-05-27 LAB
ALBUMIN SERPL-MCNC: 4.7 G/DL (ref 3.5–5.2)
ALP SERPL-CCNC: 135 U/L (ref 35–104)
ALT SERPL-CCNC: 6 U/L (ref 0–32)
ANION GAP SERPL CALCULATED.3IONS-SCNC: 16 MMOL/L (ref 7–16)
AST SERPL-CCNC: 22 U/L (ref 0–31)
BACTERIA URNS QL MICRO: ABNORMAL /HPF
BASOPHILS # BLD: 0.05 E9/L (ref 0–0.2)
BASOPHILS NFR BLD: 0.5 % (ref 0–2)
BILIRUB SERPL-MCNC: 0.2 MG/DL (ref 0–1.2)
BILIRUB UR QL STRIP: ABNORMAL
BUN SERPL-MCNC: 30 MG/DL (ref 6–20)
CALCIUM SERPL-MCNC: 9.5 MG/DL (ref 8.6–10.2)
CHLORIDE SERPL-SCNC: 97 MMOL/L (ref 98–107)
CLARITY UR: CLEAR
CO2 SERPL-SCNC: 22 MMOL/L (ref 22–29)
COLOR UR: ABNORMAL
CREAT SERPL-MCNC: 1.1 MG/DL (ref 0.5–1)
EOSINOPHIL # BLD: 0 E9/L (ref 0.05–0.5)
EOSINOPHIL NFR BLD: 0 % (ref 0–6)
ERYTHROCYTE [DISTWIDTH] IN BLOOD BY AUTOMATED COUNT: 17.1 FL (ref 11.5–15)
GLUCOSE SERPL-MCNC: 128 MG/DL (ref 74–99)
GLUCOSE UR STRIP-MCNC: 100 MG/DL
HCT VFR BLD AUTO: 24.3 % (ref 34–48)
HGB BLD-MCNC: 7.1 G/DL (ref 11.5–15.5)
HGB UR QL STRIP: NEGATIVE
IMM GRANULOCYTES # BLD: 0.06 E9/L
IMM GRANULOCYTES NFR BLD: 0.5 % (ref 0–5)
KETONES UR STRIP-MCNC: ABNORMAL MG/DL
LEUKOCYTE ESTERASE UR QL STRIP: NEGATIVE
LIPASE: 23 U/L (ref 13–60)
LYMPHOCYTES # BLD: 1.23 E9/L (ref 1.5–4)
LYMPHOCYTES NFR BLD: 11.2 % (ref 20–42)
MCH RBC QN AUTO: 18.7 PG (ref 26–35)
MCHC RBC AUTO-ENTMCNC: 29.2 % (ref 32–34.5)
MCV RBC AUTO: 63.9 FL (ref 80–99.9)
MONOCYTES # BLD: 0.83 E9/L (ref 0.1–0.95)
MONOCYTES NFR BLD: 7.5 % (ref 2–12)
NEUTROPHILS # BLD: 8.83 E9/L (ref 1.8–7.3)
NEUTS SEG NFR BLD: 80.3 % (ref 43–80)
NITRITE UR QL STRIP: POSITIVE
PH UR STRIP: 5 [PH] (ref 5–9)
PLATELET # BLD AUTO: 464 E9/L (ref 130–450)
PMV BLD AUTO: 8.8 FL (ref 7–12)
POTASSIUM SERPL-SCNC: 3.9 MMOL/L (ref 3.5–5)
PROT SERPL-MCNC: 7.7 G/DL (ref 6.4–8.3)
PROT UR STRIP-MCNC: 100 MG/DL
RBC # BLD AUTO: 3.8 E12/L (ref 3.5–5.5)
RBC #/AREA URNS HPF: ABNORMAL /HPF (ref 0–2)
SODIUM SERPL-SCNC: 135 MMOL/L (ref 132–146)
SP GR UR STRIP: 1.02 (ref 1–1.03)
UROBILINOGEN UR STRIP-ACNC: 4 E.U./DL
WBC # BLD: 11 E9/L (ref 4.5–11.5)
WBC #/AREA URNS HPF: ABNORMAL /HPF (ref 0–5)

## 2023-05-27 PROCEDURE — 96375 TX/PRO/DX INJ NEW DRUG ADDON: CPT

## 2023-05-27 PROCEDURE — 2580000003 HC RX 258: Performed by: EMERGENCY MEDICINE

## 2023-05-27 PROCEDURE — 80053 COMPREHEN METABOLIC PANEL: CPT

## 2023-05-27 PROCEDURE — 96376 TX/PRO/DX INJ SAME DRUG ADON: CPT

## 2023-05-27 PROCEDURE — 96374 THER/PROPH/DIAG INJ IV PUSH: CPT

## 2023-05-27 PROCEDURE — 83690 ASSAY OF LIPASE: CPT

## 2023-05-27 PROCEDURE — 96361 HYDRATE IV INFUSION ADD-ON: CPT

## 2023-05-27 PROCEDURE — 87088 URINE BACTERIA CULTURE: CPT

## 2023-05-27 PROCEDURE — 85025 COMPLETE CBC W/AUTO DIFF WBC: CPT

## 2023-05-27 PROCEDURE — 99284 EMERGENCY DEPT VISIT MOD MDM: CPT

## 2023-05-27 PROCEDURE — 6360000002 HC RX W HCPCS: Performed by: EMERGENCY MEDICINE

## 2023-05-27 PROCEDURE — 74176 CT ABD & PELVIS W/O CONTRAST: CPT

## 2023-05-27 PROCEDURE — 81001 URINALYSIS AUTO W/SCOPE: CPT

## 2023-05-27 RX ORDER — FENTANYL CITRATE 50 UG/ML
50 INJECTION, SOLUTION INTRAMUSCULAR; INTRAVENOUS ONCE
Status: COMPLETED | OUTPATIENT
Start: 2023-05-27 | End: 2023-05-27

## 2023-05-27 RX ORDER — FENTANYL CITRATE 50 UG/ML
25 INJECTION, SOLUTION INTRAMUSCULAR; INTRAVENOUS ONCE
Status: COMPLETED | OUTPATIENT
Start: 2023-05-27 | End: 2023-05-27

## 2023-05-27 RX ORDER — 0.9 % SODIUM CHLORIDE 0.9 %
500 INTRAVENOUS SOLUTION INTRAVENOUS ONCE
Status: COMPLETED | OUTPATIENT
Start: 2023-05-27 | End: 2023-05-27

## 2023-05-27 RX ORDER — ONDANSETRON 2 MG/ML
4 INJECTION INTRAMUSCULAR; INTRAVENOUS ONCE
Status: COMPLETED | OUTPATIENT
Start: 2023-05-27 | End: 2023-05-27

## 2023-05-27 RX ADMIN — FENTANYL CITRATE 50 MCG: 50 INJECTION, SOLUTION INTRAMUSCULAR; INTRAVENOUS at 01:19

## 2023-05-27 RX ADMIN — SODIUM CHLORIDE 500 ML: 9 INJECTION, SOLUTION INTRAVENOUS at 01:14

## 2023-05-27 RX ADMIN — ONDANSETRON 4 MG: 2 INJECTION INTRAMUSCULAR; INTRAVENOUS at 01:19

## 2023-05-27 RX ADMIN — FENTANYL CITRATE 25 MCG: 50 INJECTION, SOLUTION INTRAMUSCULAR; INTRAVENOUS at 02:36

## 2023-05-27 NOTE — DISCHARGE INSTRUCTIONS
Please contact your physician concerning follow-up urine culture results and recheck of hemoglobin    Please follow-up with your pain management physician

## 2023-05-27 NOTE — ED PROVIDER NOTES
[facility]      Pt Name: Daysi Diaz  MRN: [id number]  [place] 1965  Date of evaluation: 5/27/2023  Provider: Edwin Blanton MD             NURSING NOTES REVIEWED      CURRENT MEDICATIONS       Discharge Medication List as of 5/27/2023  3:37 AM        CONTINUE these medications which have NOT CHANGED    Details   Baclofen (LIORESAL) 5 MG tablet Take 1 tablet by mouth 3 times daily, Disp-90 tablet, R-11Normal      ezetimibe (ZETIA) 10 MG tablet Take 1 tablet by mouth nightly, Disp-30 tablet, R-11Normal      aspirin (JESUS ASPIRIN) 325 MG tablet Take 1 tablet by mouth daily, Disp-30 tablet, R-3Normal      ketoconazole (NIZORAL) 2 % shampoo apply topically to affected area nightly, Historical Med      traMADol (ULTRAM) 50 MG tablet take 1 tablet by mouth every 6 hours AS NEEDEDFOR PAINHistorical Med      losartan-hydroCHLOROthiazide (HYZAAR) 50-12.5 MG per tablet take 1 tablet by mouth once dailyHistorical Med      atorvastatin (LIPITOR) 80 MG tablet Take 1 tablet by mouth nightly, Disp-30 tablet, R-3Normal      amLODIPine (NORVASC) 5 MG tablet Take 1 tablet by mouth daily, Disp-30 tablet, R-3Normal      diclofenac sodium (VOLTAREN) 1 % GEL Apply 2 g topically 2 times daily, Topical, 2 TIMES DAILY Starting Sun 10/30/2022, Disp-30 g, R-2, Normal      folic acid (FOLVITE) 1 MG tablet Take 1 tablet by mouth daily, Disp-30 tablet, R-3Normal      clopidogrel (PLAVIX) 75 MG tablet Take 1 tablet by mouth daily, Disp-30 tablet, R-3Normal      Multiple Vitamin (MULTIVITAMIN) TABS tablet Take 1 tablet by mouth daily, Disp-30 tablet, R-1Normal      thiamine 100 MG tablet Take 1 tablet by mouth daily, Disp-30 tablet, R-3Normal      omeprazole (PRILOSEC) 20 MG delayed release capsule Take 1 capsule by mouth every morning (before breakfast), Disp-30 capsule, R-0Normal      Calcium Carbonate Antacid 400 MG CHEW Take by mouthHistorical Med

## 2023-05-28 LAB — BACTERIA UR CULT: NORMAL

## 2023-05-29 LAB — BACTERIA UR CULT: NORMAL

## 2023-05-30 ENCOUNTER — TELEPHONE (OUTPATIENT)
Dept: NEUROSURGERY | Age: 58
End: 2023-05-30

## 2023-05-30 NOTE — TELEPHONE ENCOUNTER
Patient was in the ER at [facility] on Friday the 26th with right mid/lower back pain. Her Hemoglobin was 7.1, Surya Coto stated that they wanted to transfuse her but she refused until she either talks to Dr. Maggy Cruz or sees him in person. Kidney stone was ruled out. Surya Coto is concerned due to her health history. Phone number is [phone].

## 2023-06-01 ENCOUNTER — HOSPITAL ENCOUNTER (OUTPATIENT)
Age: 58
Discharge: HOME | End: 2023-06-01
Payer: MEDICARE

## 2023-06-01 LAB
ABO + RH BLD: NORMAL
BLD GP AB SCN SERPL QL: NORMAL

## 2023-06-01 PROCEDURE — 86850 RBC ANTIBODY SCREEN: CPT

## 2023-06-01 PROCEDURE — 86901 BLOOD TYPING SEROLOGIC RH(D): CPT

## 2023-06-01 PROCEDURE — P9016 RBC LEUKOCYTES REDUCED: HCPCS

## 2023-06-01 PROCEDURE — 86923 COMPATIBILITY TEST ELECTRIC: CPT

## 2023-06-01 PROCEDURE — 86900 BLOOD TYPING SEROLOGIC ABO: CPT

## 2023-06-01 PROCEDURE — 36415 COLL VENOUS BLD VENIPUNCTURE: CPT

## 2023-06-01 NOTE — TELEPHONE ENCOUNTER
Surya Coto called again, stating that her PCP wants to transfuse her because her Hemoglobin has now dropped to 6. Also her PCP wants her to stop both Aspirin and Plavix. I talked to Dr. Maggy Cruz and he stated that due to her recent strokes, he does not recommend that she stop both but she can stop one (either aspirin or Plavix). He understands that she needs to be transfused, but again does not recommend she stop both medications only one.

## 2023-06-02 ENCOUNTER — HOSPITAL ENCOUNTER (OUTPATIENT)
Dept: INFUSION THERAPY | Age: 58
Setting detail: INFUSION SERIES
Discharge: HOME | End: 2023-06-02
Payer: MEDICARE

## 2023-06-02 VITALS
RESPIRATION RATE: 16 BRPM | HEART RATE: 59 BPM | OXYGEN SATURATION: 99 % | DIASTOLIC BLOOD PRESSURE: 73 MMHG | SYSTOLIC BLOOD PRESSURE: 148 MMHG | TEMPERATURE: 98.4 F

## 2023-06-02 LAB
BLOOD BANK DISPENSE STATUS: NORMAL
BLOOD BANK PRODUCT CODE: NORMAL
BPU ID: NORMAL
DESCRIPTION BLOOD BANK: NORMAL

## 2023-06-02 PROCEDURE — 2580000003 HC RX 258: Performed by: FAMILY MEDICINE

## 2023-06-02 PROCEDURE — 36430 TRANSFUSION BLD/BLD COMPNT: CPT

## 2023-06-02 RX ORDER — SODIUM CHLORIDE 9 MG/ML
INJECTION, SOLUTION INTRAVENOUS PRN
Status: DISCONTINUED | OUTPATIENT
Start: 2023-06-02 | End: 2023-06-03 | Stop reason: HOSPADM

## 2023-06-02 RX ORDER — SODIUM CHLORIDE 0.9 % (FLUSH) 0.9 %
5-40 SYRINGE (ML) INJECTION PRN
Status: DISCONTINUED | OUTPATIENT
Start: 2023-06-02 | End: 2023-06-03 | Stop reason: HOSPADM

## 2023-06-02 RX ADMIN — SODIUM CHLORIDE, PRESERVATIVE FREE 10 ML: 5 INJECTION INTRAVENOUS at 11:54

## 2023-06-02 NOTE — PROGRESS NOTES
Informed consent from Dr Zack Wong verified and on patient chart. Patient received 1 unit of PRBCs . Patient tolerated well. Vital signs stable. Reviewed orally and provided with written information regarding potential delayed reaction and instructions on what to do if reaction occurs. Questions answered to apparent satisfaction. Patient observed for 30 minutes after transfusion.

## 2023-06-02 NOTE — PROGRESS NOTES
Informed consent from Dr Kirby Fernandes verified and on patient chart. Patient continues to agree to treatment of blood transfusion. Patient educated on signs and symptoms of transfusion reaction. Patient verbalizes understanding.

## 2023-06-18 ENCOUNTER — HOSPITAL ENCOUNTER (EMERGENCY)
Age: 58
Discharge: HOME | End: 2023-06-19
Attending: STUDENT IN AN ORGANIZED HEALTH CARE EDUCATION/TRAINING PROGRAM
Payer: MEDICARE

## 2023-06-18 DIAGNOSIS — M54.6 ACUTE RIGHT-SIDED THORACIC BACK PAIN: Primary | ICD-10-CM

## 2023-06-18 PROCEDURE — 96374 THER/PROPH/DIAG INJ IV PUSH: CPT

## 2023-06-18 PROCEDURE — 96375 TX/PRO/DX INJ NEW DRUG ADDON: CPT

## 2023-06-18 PROCEDURE — 99285 EMERGENCY DEPT VISIT HI MDM: CPT

## 2023-06-18 PROCEDURE — 96376 TX/PRO/DX INJ SAME DRUG ADON: CPT

## 2023-06-18 ASSESSMENT — PAIN - FUNCTIONAL ASSESSMENT
PAIN_FUNCTIONAL_ASSESSMENT: INTOLERABLE, UNABLE TO DO ANY ACTIVE OR PASSIVE ACTIVITIES
PAIN_FUNCTIONAL_ASSESSMENT: 0-10

## 2023-06-18 ASSESSMENT — PAIN DESCRIPTION - FREQUENCY: FREQUENCY: CONTINUOUS

## 2023-06-18 ASSESSMENT — PAIN DESCRIPTION - ORIENTATION: ORIENTATION: RIGHT;MID

## 2023-06-18 ASSESSMENT — PAIN DESCRIPTION - ONSET: ONSET: ON-GOING

## 2023-06-18 ASSESSMENT — PAIN SCALES - GENERAL: PAINLEVEL_OUTOF10: 10

## 2023-06-18 ASSESSMENT — PAIN DESCRIPTION - DESCRIPTORS: DESCRIPTORS: SHARP;STABBING

## 2023-06-18 ASSESSMENT — PAIN DESCRIPTION - PAIN TYPE: TYPE: ACUTE PAIN

## 2023-06-18 ASSESSMENT — PAIN DESCRIPTION - LOCATION: LOCATION: BACK

## 2023-06-19 ENCOUNTER — APPOINTMENT (OUTPATIENT)
Dept: CT IMAGING | Age: 58
End: 2023-06-19
Payer: MEDICARE

## 2023-06-19 ENCOUNTER — APPOINTMENT (OUTPATIENT)
Dept: GENERAL RADIOLOGY | Age: 58
End: 2023-06-19
Payer: MEDICARE

## 2023-06-19 VITALS
TEMPERATURE: 98.7 F | RESPIRATION RATE: 24 BRPM | DIASTOLIC BLOOD PRESSURE: 86 MMHG | WEIGHT: 212.6 LBS | BODY MASS INDEX: 32.22 KG/M2 | HEART RATE: 75 BPM | OXYGEN SATURATION: 97 % | HEIGHT: 68 IN | SYSTOLIC BLOOD PRESSURE: 166 MMHG

## 2023-06-19 LAB
ALBUMIN SERPL-MCNC: 4.7 G/DL (ref 3.5–5.2)
ALP SERPL-CCNC: 121 U/L (ref 35–104)
ALT SERPL-CCNC: 19 U/L (ref 0–32)
ANION GAP SERPL CALCULATED.3IONS-SCNC: 14 MMOL/L (ref 7–16)
ANISOCYTOSIS: ABNORMAL
AST SERPL-CCNC: 23 U/L (ref 0–31)
BACTERIA URNS QL MICRO: NORMAL /HPF
BASOPHILS # BLD: 0.06 E9/L (ref 0–0.2)
BASOPHILS NFR BLD: 0.7 % (ref 0–2)
BILIRUB SERPL-MCNC: 0.3 MG/DL (ref 0–1.2)
BILIRUB UR QL STRIP: ABNORMAL
BUN SERPL-MCNC: 24 MG/DL (ref 6–20)
CALCIUM SERPL-MCNC: 9.4 MG/DL (ref 8.6–10.2)
CHLORIDE SERPL-SCNC: 94 MMOL/L (ref 98–107)
CLARITY UR: CLEAR
CO2 SERPL-SCNC: 23 MMOL/L (ref 22–29)
COLOR UR: ABNORMAL
CREAT SERPL-MCNC: 1.1 MG/DL (ref 0.5–1)
EKG ATRIAL RATE: 65 BPM
EKG P AXIS: 44 DEGREES
EKG P-R INTERVAL: 230 MS
EKG Q-T INTERVAL: 420 MS
EKG QRS DURATION: 92 MS
EKG QTC CALCULATION (BAZETT): 436 MS
EKG R AXIS: -11 DEGREES
EKG T AXIS: 4 DEGREES
EKG VENTRICULAR RATE: 65 BPM
EOSINOPHIL # BLD: 0.08 E9/L (ref 0.05–0.5)
EOSINOPHIL NFR BLD: 0.9 % (ref 0–6)
ERYTHROCYTE [DISTWIDTH] IN BLOOD BY AUTOMATED COUNT: 21.6 FL (ref 11.5–15)
GLUCOSE SERPL-MCNC: 95 MG/DL (ref 74–99)
GLUCOSE UR STRIP-MCNC: 250 MG/DL
HCT VFR BLD AUTO: 29.3 % (ref 34–48)
HGB BLD-MCNC: 8.7 G/DL (ref 11.5–15.5)
HGB UR QL STRIP: NEGATIVE
HYPOCHROMIA: ABNORMAL
IMM GRANULOCYTES # BLD: 0.04 E9/L
IMM GRANULOCYTES NFR BLD: 0.5 % (ref 0–5)
KETONES UR STRIP-MCNC: ABNORMAL MG/DL
LACTATE BLDV-SCNC: 1 MMOL/L (ref 0.5–2.2)
LEUKOCYTE ESTERASE UR QL STRIP: ABNORMAL
LIPASE: 25 U/L (ref 13–60)
LYMPHOCYTES # BLD: 1.86 E9/L (ref 1.5–4)
LYMPHOCYTES NFR BLD: 21.2 % (ref 20–42)
MCH RBC QN AUTO: 19.6 PG (ref 26–35)
MCHC RBC AUTO-ENTMCNC: 29.7 % (ref 32–34.5)
MCV RBC AUTO: 65.8 FL (ref 80–99.9)
MONOCYTES # BLD: 0.81 E9/L (ref 0.1–0.95)
MONOCYTES NFR BLD: 9.2 % (ref 2–12)
NEUTROPHILS # BLD: 5.94 E9/L (ref 1.8–7.3)
NEUTS SEG NFR BLD: 67.5 % (ref 43–80)
NITRITE UR QL STRIP: POSITIVE
PH UR STRIP: 5 [PH] (ref 5–9)
PLATELET # BLD AUTO: 395 E9/L (ref 130–450)
PMV BLD AUTO: 8.3 FL (ref 7–12)
POIKILOCYTES: ABNORMAL
POLYCHROMASIA: ABNORMAL
POTASSIUM SERPL-SCNC: 3.7 MMOL/L (ref 3.5–5)
PROT SERPL-MCNC: 7.5 G/DL (ref 6.4–8.3)
PROT UR STRIP-MCNC: 100 MG/DL
RBC # BLD AUTO: 4.45 E12/L (ref 3.5–5.5)
RBC #/AREA URNS HPF: NORMAL /HPF (ref 0–2)
SCHISTOCYTES: ABNORMAL
SODIUM SERPL-SCNC: 131 MMOL/L (ref 132–146)
SP GR UR STRIP: 1.02 (ref 1–1.03)
TARGET CELLS: ABNORMAL
TEAR DROP CELLS: ABNORMAL
TROPONIN, HIGH SENSITIVITY: 9 NG/L (ref 0–9)
UROBILINOGEN UR STRIP-ACNC: >=8 E.U./DL
WBC # BLD: 8.8 E9/L (ref 4.5–11.5)
WBC #/AREA URNS HPF: NORMAL /HPF (ref 0–5)

## 2023-06-19 PROCEDURE — 96374 THER/PROPH/DIAG INJ IV PUSH: CPT

## 2023-06-19 PROCEDURE — 93005 ELECTROCARDIOGRAM TRACING: CPT | Performed by: STUDENT IN AN ORGANIZED HEALTH CARE EDUCATION/TRAINING PROGRAM

## 2023-06-19 PROCEDURE — 96376 TX/PRO/DX INJ SAME DRUG ADON: CPT

## 2023-06-19 PROCEDURE — 83605 ASSAY OF LACTIC ACID: CPT

## 2023-06-19 PROCEDURE — 72128 CT CHEST SPINE W/O DYE: CPT

## 2023-06-19 PROCEDURE — 71045 X-RAY EXAM CHEST 1 VIEW: CPT

## 2023-06-19 PROCEDURE — 2580000003 HC RX 258: Performed by: STUDENT IN AN ORGANIZED HEALTH CARE EDUCATION/TRAINING PROGRAM

## 2023-06-19 PROCEDURE — 85025 COMPLETE CBC W/AUTO DIFF WBC: CPT

## 2023-06-19 PROCEDURE — 74177 CT ABD & PELVIS W/CONTRAST: CPT

## 2023-06-19 PROCEDURE — 6370000000 HC RX 637 (ALT 250 FOR IP): Performed by: STUDENT IN AN ORGANIZED HEALTH CARE EDUCATION/TRAINING PROGRAM

## 2023-06-19 PROCEDURE — 93010 ELECTROCARDIOGRAM REPORT: CPT | Performed by: INTERNAL MEDICINE

## 2023-06-19 PROCEDURE — 96375 TX/PRO/DX INJ NEW DRUG ADDON: CPT

## 2023-06-19 PROCEDURE — 81001 URINALYSIS AUTO W/SCOPE: CPT

## 2023-06-19 PROCEDURE — 36415 COLL VENOUS BLD VENIPUNCTURE: CPT

## 2023-06-19 PROCEDURE — 83690 ASSAY OF LIPASE: CPT

## 2023-06-19 PROCEDURE — 6360000004 HC RX CONTRAST MEDICATION: Performed by: RADIOLOGY

## 2023-06-19 PROCEDURE — 84484 ASSAY OF TROPONIN QUANT: CPT

## 2023-06-19 PROCEDURE — 80053 COMPREHEN METABOLIC PANEL: CPT

## 2023-06-19 PROCEDURE — 6360000002 HC RX W HCPCS: Performed by: STUDENT IN AN ORGANIZED HEALTH CARE EDUCATION/TRAINING PROGRAM

## 2023-06-19 RX ORDER — FENTANYL CITRATE 50 UG/ML
50 INJECTION, SOLUTION INTRAMUSCULAR; INTRAVENOUS ONCE
Status: COMPLETED | OUTPATIENT
Start: 2023-06-19 | End: 2023-06-19

## 2023-06-19 RX ORDER — MORPHINE SULFATE 4 MG/ML
4 INJECTION, SOLUTION INTRAMUSCULAR; INTRAVENOUS ONCE
Status: DISCONTINUED | OUTPATIENT
Start: 2023-06-19 | End: 2023-06-19

## 2023-06-19 RX ORDER — KETOROLAC TROMETHAMINE 30 MG/ML
15 INJECTION, SOLUTION INTRAMUSCULAR; INTRAVENOUS ONCE
Status: COMPLETED | OUTPATIENT
Start: 2023-06-19 | End: 2023-06-19

## 2023-06-19 RX ORDER — 0.9 % SODIUM CHLORIDE 0.9 %
1000 INTRAVENOUS SOLUTION INTRAVENOUS ONCE
Status: COMPLETED | OUTPATIENT
Start: 2023-06-19 | End: 2023-06-19

## 2023-06-19 RX ORDER — CYCLOBENZAPRINE HCL 10 MG
5 TABLET ORAL ONCE
Status: COMPLETED | OUTPATIENT
Start: 2023-06-19 | End: 2023-06-19

## 2023-06-19 RX ADMIN — SODIUM CHLORIDE 1000 ML: 9 INJECTION, SOLUTION INTRAVENOUS at 00:32

## 2023-06-19 RX ADMIN — FENTANYL CITRATE 50 MCG: 50 INJECTION, SOLUTION INTRAMUSCULAR; INTRAVENOUS at 00:33

## 2023-06-19 RX ADMIN — KETOROLAC TROMETHAMINE 15 MG: 30 INJECTION, SOLUTION INTRAMUSCULAR; INTRAVENOUS at 02:53

## 2023-06-19 RX ADMIN — FENTANYL CITRATE 50 MCG: 50 INJECTION, SOLUTION INTRAMUSCULAR; INTRAVENOUS at 01:37

## 2023-06-19 RX ADMIN — IOPAMIDOL 75 ML: 755 INJECTION, SOLUTION INTRAVENOUS at 02:02

## 2023-06-19 RX ADMIN — CYCLOBENZAPRINE 5 MG: 10 TABLET, FILM COATED ORAL at 02:52

## 2023-06-19 ASSESSMENT — PAIN DESCRIPTION - ORIENTATION
ORIENTATION: RIGHT
ORIENTATION: RIGHT

## 2023-06-19 ASSESSMENT — ENCOUNTER SYMPTOMS
EYE PAIN: 0
SORE THROAT: 0
WHEEZING: 0
EYE REDNESS: 0
NAUSEA: 0
SHORTNESS OF BREATH: 0
VOMITING: 0
COUGH: 0
SINUS PRESSURE: 0
EYE DISCHARGE: 0
ABDOMINAL DISTENTION: 0
DIARRHEA: 0
BACK PAIN: 1

## 2023-06-19 ASSESSMENT — PAIN DESCRIPTION - LOCATION
LOCATION: FLANK
LOCATION: FLANK

## 2023-06-19 ASSESSMENT — PAIN SCALES - GENERAL
PAINLEVEL_OUTOF10: 4
PAINLEVEL_OUTOF10: 10
PAINLEVEL_OUTOF10: 5
PAINLEVEL_OUTOF10: 7
PAINLEVEL_OUTOF10: 8

## 2023-06-19 ASSESSMENT — PAIN - FUNCTIONAL ASSESSMENT
PAIN_FUNCTIONAL_ASSESSMENT: 0-10
PAIN_FUNCTIONAL_ASSESSMENT: 0-10
PAIN_FUNCTIONAL_ASSESSMENT: PREVENTS OR INTERFERES SOME ACTIVE ACTIVITIES AND ADLS

## 2023-06-19 ASSESSMENT — PAIN DESCRIPTION - DESCRIPTORS: DESCRIPTORS: ACHING;SHARP;SHOOTING

## 2023-06-19 ASSESSMENT — PAIN DESCRIPTION - ONSET: ONSET: ON-GOING

## 2023-06-19 ASSESSMENT — PAIN DESCRIPTION - PAIN TYPE: TYPE: ACUTE PAIN

## 2023-06-19 ASSESSMENT — PAIN DESCRIPTION - FREQUENCY: FREQUENCY: CONTINUOUS

## 2023-06-19 NOTE — ED PROVIDER NOTES
Department of Emergency Medicine   ED  Provider Note  Admit Date/RoomTime: 6/18/2023 11:29 PM  ED Room: 05/05              [facility]     Oliverio Romero is a 63 y.o. female with a PMHx significant for  CVA  who presents for evaluation of right middle back pain, beginning prior to arrival.  The complaint has been persistent, severe in severity, and worsened by nothing. The patient states that this afternoon around [id number] she started to develop back discomfort. Notes it is sharp in nature as if someone is taking a knife and stabbing her in the back. Notes this is the fourth episode of this over the last year. Denies any trauma to the area. Has any shortness of breath or associated chest discomfort, denies any nausea, vomiting, diarrhea. Denies any numbness or tingling. States that she is to see pain management for this. Also endorses history recently of anemia for which she is post to get scope with Dr. Jennifer Shafer tomorrow. Review of Systems   Constitutional:  Negative for chills and fever. HENT:  Negative for ear pain, sinus pressure and sore throat. Eyes:  Negative for pain, discharge and redness. Respiratory:  Negative for cough, shortness of breath and wheezing. Cardiovascular:  Negative for chest pain. Gastrointestinal:  Negative for abdominal distention, diarrhea, nausea and vomiting. Genitourinary:  Negative for dysuria and frequency. Musculoskeletal:  Positive for back pain. Negative for arthralgias. Skin:  Negative for rash and wound. Neurological:  Negative for weakness and headaches. Hematological:  Negative for adenopathy. All other systems reviewed and are negative. Physical Exam  Vitals and nursing note reviewed. Constitutional:       General: She is not in acute distress. Appearance: Normal appearance. She is well-developed. She is not ill-appearing. HENT:      Head: Normocephalic and atraumatic.       Right Ear: External ear normal.      Left Ear: External ear

## 2023-06-19 NOTE — DISCHARGE INSTRUCTIONS
Follow up with Dr. Jamaal Celeste; continue taking your antibiotic for UTI. If symptoms worsen or concern arises return for re-evaluation.

## 2023-07-26 ENCOUNTER — TELEPHONE (OUTPATIENT)
Dept: NEUROLOGY | Age: 58
End: 2023-07-26

## 2023-07-26 NOTE — TELEPHONE ENCOUNTER
Spoke w/Jacquelin and her [employer]. They called because Jacquelin's PCP would like Jacquelin to start Pre- vitamins, and Pramipexole (for restless leg syndrome). Also her PCP would like Jacquelin to either start an iron pill, or an iron supplement or have an  Iron infusion. This is due to her low iron count (8.8). They want to check with Dr. Laura Quiroga on his opinion on what she should do.

## 2023-07-28 NOTE — TELEPHONE ENCOUNTER
Yes for RLS  Iron pills + vitamin C    OR     Iron at infusion is likely the best option in my opinion for RLS    Ferritin Target of 50-80 is associated with best symptoms improvement without side effects    Should be safe in her stroke as her stroke is not related to hematological disorder    Please forward to PCP    [facility]

## 2023-08-07 DIAGNOSIS — D64.9 ANEMIA, UNSPECIFIED TYPE: ICD-10-CM

## 2023-08-07 RX ORDER — SODIUM CHLORIDE 9 MG/ML
5-250 INJECTION, SOLUTION INTRAVENOUS PRN
OUTPATIENT
Start: 2023-08-10

## 2023-08-07 RX ORDER — DIPHENHYDRAMINE HYDROCHLORIDE 50 MG/ML
50 INJECTION INTRAMUSCULAR; INTRAVENOUS
OUTPATIENT
Start: 2023-08-10

## 2023-08-07 RX ORDER — EPINEPHRINE 1 MG/ML
0.3 INJECTION, SOLUTION, CONCENTRATE INTRAVENOUS PRN
OUTPATIENT
Start: 2023-08-10

## 2023-08-07 RX ORDER — SODIUM CHLORIDE 9 MG/ML
INJECTION, SOLUTION INTRAVENOUS CONTINUOUS
OUTPATIENT
Start: 2023-08-10

## 2023-08-07 RX ORDER — SODIUM CHLORIDE 0.9 % (FLUSH) 0.9 %
5-40 SYRINGE (ML) INJECTION PRN
OUTPATIENT
Start: 2023-08-10

## 2023-08-24 ENCOUNTER — HOSPITAL ENCOUNTER (OUTPATIENT)
Dept: INFUSION THERAPY | Age: 58
Setting detail: INFUSION SERIES
Discharge: HOME | End: 2023-08-24
Payer: MEDICARE

## 2023-08-24 VITALS
OXYGEN SATURATION: 100 % | DIASTOLIC BLOOD PRESSURE: 77 MMHG | HEART RATE: 73 BPM | WEIGHT: 210 LBS | RESPIRATION RATE: 16 BRPM | BODY MASS INDEX: 31.83 KG/M2 | HEIGHT: 68 IN | TEMPERATURE: 97.8 F | SYSTOLIC BLOOD PRESSURE: 153 MMHG

## 2023-08-24 DIAGNOSIS — D64.9 ANEMIA, UNSPECIFIED TYPE: Primary | ICD-10-CM

## 2023-08-24 PROCEDURE — 2580000003 HC RX 258: Performed by: FAMILY MEDICINE

## 2023-08-24 PROCEDURE — 6360000002 HC RX W HCPCS: Performed by: FAMILY MEDICINE

## 2023-08-24 PROCEDURE — 96366 THER/PROPH/DIAG IV INF ADDON: CPT

## 2023-08-24 PROCEDURE — 96365 THER/PROPH/DIAG IV INF INIT: CPT

## 2023-08-24 RX ORDER — EPINEPHRINE 1 MG/ML
0.3 INJECTION, SOLUTION, CONCENTRATE INTRAVENOUS PRN
Status: CANCELLED | OUTPATIENT
Start: 2023-08-31

## 2023-08-24 RX ORDER — SODIUM CHLORIDE 9 MG/ML
5-250 INJECTION, SOLUTION INTRAVENOUS PRN
Status: DISCONTINUED | OUTPATIENT
Start: 2023-08-24 | End: 2023-08-25 | Stop reason: HOSPADM

## 2023-08-24 RX ORDER — SODIUM CHLORIDE 9 MG/ML
INJECTION, SOLUTION INTRAVENOUS CONTINUOUS
Status: CANCELLED | OUTPATIENT
Start: 2023-08-31

## 2023-08-24 RX ORDER — SODIUM CHLORIDE 9 MG/ML
5-250 INJECTION, SOLUTION INTRAVENOUS PRN
Status: CANCELLED | OUTPATIENT
Start: 2023-08-31

## 2023-08-24 RX ORDER — SODIUM CHLORIDE 0.9 % (FLUSH) 0.9 %
5-40 SYRINGE (ML) INJECTION PRN
Status: DISCONTINUED | OUTPATIENT
Start: 2023-08-24 | End: 2023-08-25 | Stop reason: HOSPADM

## 2023-08-24 RX ORDER — DIPHENHYDRAMINE HYDROCHLORIDE 50 MG/ML
50 INJECTION INTRAMUSCULAR; INTRAVENOUS
Status: CANCELLED | OUTPATIENT
Start: 2023-08-31

## 2023-08-24 RX ORDER — SODIUM CHLORIDE 0.9 % (FLUSH) 0.9 %
5-40 SYRINGE (ML) INJECTION PRN
Status: CANCELLED | OUTPATIENT
Start: 2023-08-31

## 2023-08-24 RX ADMIN — SODIUM CHLORIDE 20 ML/HR: 9 INJECTION, SOLUTION INTRAVENOUS at 10:33

## 2023-08-24 RX ADMIN — SODIUM CHLORIDE 25 MG: 9 INJECTION, SOLUTION INTRAVENOUS at 10:35

## 2023-08-24 RX ADMIN — SODIUM CHLORIDE 100 MG: 9 INJECTION, SOLUTION INTRAVENOUS at 11:32

## 2023-08-24 RX ADMIN — SODIUM CHLORIDE, PRESERVATIVE FREE 10 ML: 5 INJECTION INTRAVENOUS at 10:30

## 2023-08-24 RX ADMIN — SODIUM CHLORIDE, PRESERVATIVE FREE 10 ML: 5 INJECTION INTRAVENOUS at 12:33

## 2023-08-31 ENCOUNTER — HOSPITAL ENCOUNTER (OUTPATIENT)
Dept: INFUSION THERAPY | Age: 58
Setting detail: INFUSION SERIES
Discharge: HOME | End: 2023-08-31
Payer: MEDICARE

## 2023-08-31 VITALS
BODY MASS INDEX: 31.83 KG/M2 | HEART RATE: 63 BPM | WEIGHT: 210 LBS | HEIGHT: 68 IN | OXYGEN SATURATION: 100 % | SYSTOLIC BLOOD PRESSURE: 116 MMHG | RESPIRATION RATE: 16 BRPM | TEMPERATURE: 97.2 F | DIASTOLIC BLOOD PRESSURE: 71 MMHG

## 2023-08-31 DIAGNOSIS — D64.9 ANEMIA, UNSPECIFIED TYPE: Primary | ICD-10-CM

## 2023-08-31 PROCEDURE — 6360000002 HC RX W HCPCS: Performed by: FAMILY MEDICINE

## 2023-08-31 PROCEDURE — 96365 THER/PROPH/DIAG IV INF INIT: CPT

## 2023-08-31 PROCEDURE — 2580000003 HC RX 258: Performed by: FAMILY MEDICINE

## 2023-08-31 RX ORDER — SODIUM CHLORIDE 0.9 % (FLUSH) 0.9 %
5-40 SYRINGE (ML) INJECTION PRN
Status: DISCONTINUED | OUTPATIENT
Start: 2023-08-31 | End: 2023-09-01 | Stop reason: HOSPADM

## 2023-08-31 RX ORDER — SODIUM CHLORIDE 9 MG/ML
INJECTION, SOLUTION INTRAVENOUS CONTINUOUS
Status: CANCELLED | OUTPATIENT
Start: 2023-09-07

## 2023-08-31 RX ORDER — SODIUM CHLORIDE 0.9 % (FLUSH) 0.9 %
5-40 SYRINGE (ML) INJECTION PRN
Status: CANCELLED | OUTPATIENT
Start: 2023-09-07

## 2023-08-31 RX ORDER — DIPHENHYDRAMINE HYDROCHLORIDE 50 MG/ML
50 INJECTION INTRAMUSCULAR; INTRAVENOUS
Status: CANCELLED | OUTPATIENT
Start: 2023-09-07

## 2023-08-31 RX ORDER — EPINEPHRINE 1 MG/ML
0.3 INJECTION, SOLUTION, CONCENTRATE INTRAVENOUS PRN
Status: CANCELLED | OUTPATIENT
Start: 2023-09-07

## 2023-08-31 RX ORDER — SODIUM CHLORIDE 9 MG/ML
5-250 INJECTION, SOLUTION INTRAVENOUS PRN
Status: CANCELLED | OUTPATIENT
Start: 2023-09-07

## 2023-08-31 RX ORDER — SODIUM CHLORIDE 9 MG/ML
5-250 INJECTION, SOLUTION INTRAVENOUS PRN
Status: DISCONTINUED | OUTPATIENT
Start: 2023-08-31 | End: 2023-09-01 | Stop reason: HOSPADM

## 2023-08-31 RX ADMIN — SODIUM CHLORIDE, PRESERVATIVE FREE 10 ML: 5 INJECTION INTRAVENOUS at 09:50

## 2023-08-31 RX ADMIN — SODIUM CHLORIDE 20 ML/HR: 9 INJECTION, SOLUTION INTRAVENOUS at 09:52

## 2023-08-31 RX ADMIN — SODIUM CHLORIDE, PRESERVATIVE FREE 10 ML: 5 INJECTION INTRAVENOUS at 10:56

## 2023-08-31 RX ADMIN — SODIUM CHLORIDE 125 MG: 9 INJECTION, SOLUTION INTRAVENOUS at 09:58

## 2023-09-07 ENCOUNTER — HOSPITAL ENCOUNTER (OUTPATIENT)
Dept: INFUSION THERAPY | Age: 58
Setting detail: INFUSION SERIES
Discharge: HOME | End: 2023-09-07
Payer: MEDICARE

## 2023-09-07 VITALS
DIASTOLIC BLOOD PRESSURE: 73 MMHG | TEMPERATURE: 97.1 F | OXYGEN SATURATION: 100 % | HEART RATE: 69 BPM | SYSTOLIC BLOOD PRESSURE: 139 MMHG | RESPIRATION RATE: 16 BRPM

## 2023-09-07 DIAGNOSIS — D64.9 ANEMIA, UNSPECIFIED TYPE: Primary | ICD-10-CM

## 2023-09-07 PROCEDURE — 96365 THER/PROPH/DIAG IV INF INIT: CPT

## 2023-09-07 PROCEDURE — 6360000002 HC RX W HCPCS: Performed by: FAMILY MEDICINE

## 2023-09-07 PROCEDURE — 2580000003 HC RX 258: Performed by: FAMILY MEDICINE

## 2023-09-07 RX ORDER — SODIUM CHLORIDE 9 MG/ML
INJECTION, SOLUTION INTRAVENOUS CONTINUOUS
Status: CANCELLED | OUTPATIENT
Start: 2023-09-14

## 2023-09-07 RX ORDER — SODIUM CHLORIDE 0.9 % (FLUSH) 0.9 %
5-40 SYRINGE (ML) INJECTION PRN
Status: CANCELLED | OUTPATIENT
Start: 2023-09-14

## 2023-09-07 RX ORDER — SODIUM CHLORIDE 9 MG/ML
5-250 INJECTION, SOLUTION INTRAVENOUS PRN
Status: CANCELLED | OUTPATIENT
Start: 2023-09-14

## 2023-09-07 RX ORDER — DIPHENHYDRAMINE HYDROCHLORIDE 50 MG/ML
50 INJECTION INTRAMUSCULAR; INTRAVENOUS
Status: CANCELLED | OUTPATIENT
Start: 2023-09-14

## 2023-09-07 RX ORDER — SODIUM CHLORIDE 9 MG/ML
5-250 INJECTION, SOLUTION INTRAVENOUS PRN
Status: DISCONTINUED | OUTPATIENT
Start: 2023-09-07 | End: 2023-09-08 | Stop reason: HOSPADM

## 2023-09-07 RX ORDER — SODIUM CHLORIDE 0.9 % (FLUSH) 0.9 %
5-40 SYRINGE (ML) INJECTION PRN
Status: DISCONTINUED | OUTPATIENT
Start: 2023-09-07 | End: 2023-09-08 | Stop reason: HOSPADM

## 2023-09-07 RX ORDER — EPINEPHRINE 1 MG/ML
0.3 INJECTION, SOLUTION, CONCENTRATE INTRAVENOUS PRN
Status: CANCELLED | OUTPATIENT
Start: 2023-09-14

## 2023-09-07 RX ADMIN — SODIUM CHLORIDE 20 ML/HR: 9 INJECTION, SOLUTION INTRAVENOUS at 10:13

## 2023-09-07 RX ADMIN — SODIUM CHLORIDE, PRESERVATIVE FREE 10 ML: 5 INJECTION INTRAVENOUS at 11:31

## 2023-09-07 RX ADMIN — SODIUM CHLORIDE 125 MG: 9 INJECTION, SOLUTION INTRAVENOUS at 10:27

## 2023-09-07 NOTE — PROGRESS NOTES
Tolerated infusion well. Reviewed therapy plan, offered education material and/or discharge material, reviewed medication information and signs and symptoms  and educated on possible side effects, verbalizes good knowledge of current plan patient verbalizes understanding, and has no signs or symptoms to report at this time. Patient discharged. Patient alert and oriented x3. No distress noted. Vital signs stable. Patient denies any new or worsening pain. Patient denies any needs. All questions answered. Next appointment scheduled. DECLINEScopy of AVS. Patient stayed for 30 minute observation after completion of infusion.

## 2023-09-14 ENCOUNTER — HOSPITAL ENCOUNTER (OUTPATIENT)
Dept: INFUSION THERAPY | Age: 58
Setting detail: INFUSION SERIES
Discharge: HOME | End: 2023-09-14
Payer: MEDICARE

## 2023-09-14 VITALS
DIASTOLIC BLOOD PRESSURE: 73 MMHG | RESPIRATION RATE: 16 BRPM | WEIGHT: 210 LBS | HEIGHT: 68 IN | SYSTOLIC BLOOD PRESSURE: 110 MMHG | OXYGEN SATURATION: 100 % | HEART RATE: 66 BPM | TEMPERATURE: 97.8 F | BODY MASS INDEX: 31.83 KG/M2

## 2023-09-14 DIAGNOSIS — D64.9 ANEMIA, UNSPECIFIED TYPE: Primary | ICD-10-CM

## 2023-09-14 PROCEDURE — 96365 THER/PROPH/DIAG IV INF INIT: CPT

## 2023-09-14 PROCEDURE — 6360000002 HC RX W HCPCS: Performed by: FAMILY MEDICINE

## 2023-09-14 PROCEDURE — 2580000003 HC RX 258: Performed by: FAMILY MEDICINE

## 2023-09-14 RX ORDER — DIPHENHYDRAMINE HYDROCHLORIDE 50 MG/ML
50 INJECTION INTRAMUSCULAR; INTRAVENOUS
Status: CANCELLED | OUTPATIENT
Start: 2023-09-21

## 2023-09-14 RX ORDER — SODIUM CHLORIDE 9 MG/ML
INJECTION, SOLUTION INTRAVENOUS CONTINUOUS
Status: CANCELLED | OUTPATIENT
Start: 2023-09-21

## 2023-09-14 RX ORDER — SODIUM CHLORIDE 0.9 % (FLUSH) 0.9 %
5-40 SYRINGE (ML) INJECTION PRN
Status: CANCELLED | OUTPATIENT
Start: 2023-09-21

## 2023-09-14 RX ORDER — SODIUM CHLORIDE 0.9 % (FLUSH) 0.9 %
5-40 SYRINGE (ML) INJECTION PRN
Status: DISCONTINUED | OUTPATIENT
Start: 2023-09-14 | End: 2023-09-15 | Stop reason: HOSPADM

## 2023-09-14 RX ORDER — SODIUM CHLORIDE 9 MG/ML
5-250 INJECTION, SOLUTION INTRAVENOUS PRN
Status: CANCELLED | OUTPATIENT
Start: 2023-09-21

## 2023-09-14 RX ORDER — SODIUM CHLORIDE 9 MG/ML
5-250 INJECTION, SOLUTION INTRAVENOUS PRN
Status: DISCONTINUED | OUTPATIENT
Start: 2023-09-14 | End: 2023-09-15 | Stop reason: HOSPADM

## 2023-09-14 RX ORDER — EPINEPHRINE 1 MG/ML
0.3 INJECTION, SOLUTION, CONCENTRATE INTRAVENOUS PRN
Status: CANCELLED | OUTPATIENT
Start: 2023-09-21

## 2023-09-14 RX ADMIN — SODIUM CHLORIDE, PRESERVATIVE FREE 10 ML: 5 INJECTION INTRAVENOUS at 10:03

## 2023-09-14 RX ADMIN — SODIUM CHLORIDE 125 MG: 9 INJECTION, SOLUTION INTRAVENOUS at 10:05

## 2023-09-14 RX ADMIN — SODIUM CHLORIDE 20 ML/HR: 9 INJECTION, SOLUTION INTRAVENOUS at 10:04

## 2023-09-14 RX ADMIN — SODIUM CHLORIDE, PRESERVATIVE FREE 10 ML: 5 INJECTION INTRAVENOUS at 11:07

## 2023-09-21 ENCOUNTER — HOSPITAL ENCOUNTER (OUTPATIENT)
Dept: INFUSION THERAPY | Age: 58
Setting detail: INFUSION SERIES
Discharge: HOME | End: 2023-09-21
Payer: MEDICARE

## 2023-09-21 VITALS
DIASTOLIC BLOOD PRESSURE: 60 MMHG | SYSTOLIC BLOOD PRESSURE: 129 MMHG | HEIGHT: 68 IN | BODY MASS INDEX: 31.83 KG/M2 | TEMPERATURE: 97.3 F | RESPIRATION RATE: 16 BRPM | HEART RATE: 54 BPM | OXYGEN SATURATION: 97 % | WEIGHT: 210 LBS

## 2023-09-21 DIAGNOSIS — D64.9 ANEMIA, UNSPECIFIED TYPE: Primary | ICD-10-CM

## 2023-09-21 PROCEDURE — 6360000002 HC RX W HCPCS: Performed by: FAMILY MEDICINE

## 2023-09-21 PROCEDURE — 2580000003 HC RX 258: Performed by: FAMILY MEDICINE

## 2023-09-21 PROCEDURE — 96365 THER/PROPH/DIAG IV INF INIT: CPT

## 2023-09-21 RX ORDER — SODIUM CHLORIDE 9 MG/ML
5-250 INJECTION, SOLUTION INTRAVENOUS PRN
Status: DISCONTINUED | OUTPATIENT
Start: 2023-09-21 | End: 2023-09-22 | Stop reason: HOSPADM

## 2023-09-21 RX ORDER — SODIUM CHLORIDE 0.9 % (FLUSH) 0.9 %
5-40 SYRINGE (ML) INJECTION PRN
Status: DISCONTINUED | OUTPATIENT
Start: 2023-09-21 | End: 2023-09-22 | Stop reason: HOSPADM

## 2023-09-21 RX ORDER — EPINEPHRINE 1 MG/ML
0.3 INJECTION, SOLUTION, CONCENTRATE INTRAVENOUS PRN
Status: CANCELLED | OUTPATIENT
Start: 2023-09-28

## 2023-09-21 RX ORDER — SODIUM CHLORIDE 0.9 % (FLUSH) 0.9 %
5-40 SYRINGE (ML) INJECTION PRN
Status: CANCELLED | OUTPATIENT
Start: 2023-09-28

## 2023-09-21 RX ORDER — SODIUM CHLORIDE 9 MG/ML
5-250 INJECTION, SOLUTION INTRAVENOUS PRN
Status: CANCELLED | OUTPATIENT
Start: 2023-09-28

## 2023-09-21 RX ORDER — DIPHENHYDRAMINE HYDROCHLORIDE 50 MG/ML
50 INJECTION INTRAMUSCULAR; INTRAVENOUS
Status: CANCELLED | OUTPATIENT
Start: 2023-09-28

## 2023-09-21 RX ORDER — SODIUM CHLORIDE 9 MG/ML
INJECTION, SOLUTION INTRAVENOUS CONTINUOUS
Status: CANCELLED | OUTPATIENT
Start: 2023-09-28

## 2023-09-21 RX ADMIN — SODIUM CHLORIDE, PRESERVATIVE FREE 10 ML: 5 INJECTION INTRAVENOUS at 10:55

## 2023-09-21 RX ADMIN — SODIUM CHLORIDE, PRESERVATIVE FREE 10 ML: 5 INJECTION INTRAVENOUS at 12:05

## 2023-09-21 RX ADMIN — SODIUM CHLORIDE 20 ML/HR: 9 INJECTION, SOLUTION INTRAVENOUS at 10:58

## 2023-09-21 RX ADMIN — SODIUM CHLORIDE 125 MG: 9 INJECTION, SOLUTION INTRAVENOUS at 11:06

## 2023-09-28 ENCOUNTER — HOSPITAL ENCOUNTER (OUTPATIENT)
Dept: INFUSION THERAPY | Age: 58
Setting detail: INFUSION SERIES
Discharge: HOME | End: 2023-09-28
Payer: MEDICARE

## 2023-09-28 VITALS
OXYGEN SATURATION: 100 % | DIASTOLIC BLOOD PRESSURE: 75 MMHG | BODY MASS INDEX: 31.83 KG/M2 | HEIGHT: 68 IN | SYSTOLIC BLOOD PRESSURE: 131 MMHG | HEART RATE: 53 BPM | WEIGHT: 210 LBS | RESPIRATION RATE: 18 BRPM | TEMPERATURE: 96.8 F

## 2023-09-28 DIAGNOSIS — D64.9 ANEMIA, UNSPECIFIED TYPE: Primary | ICD-10-CM

## 2023-09-28 PROCEDURE — 96365 THER/PROPH/DIAG IV INF INIT: CPT

## 2023-09-28 PROCEDURE — 2580000003 HC RX 258: Performed by: FAMILY MEDICINE

## 2023-09-28 PROCEDURE — 6360000002 HC RX W HCPCS: Performed by: FAMILY MEDICINE

## 2023-09-28 RX ORDER — SODIUM CHLORIDE 9 MG/ML
INJECTION, SOLUTION INTRAVENOUS CONTINUOUS
Status: CANCELLED | OUTPATIENT
Start: 2023-10-05

## 2023-09-28 RX ORDER — SODIUM CHLORIDE 0.9 % (FLUSH) 0.9 %
5-40 SYRINGE (ML) INJECTION PRN
Status: CANCELLED | OUTPATIENT
Start: 2023-10-05

## 2023-09-28 RX ORDER — EPINEPHRINE 1 MG/ML
0.3 INJECTION, SOLUTION, CONCENTRATE INTRAVENOUS PRN
Status: CANCELLED | OUTPATIENT
Start: 2023-10-05

## 2023-09-28 RX ORDER — SODIUM CHLORIDE 9 MG/ML
5-250 INJECTION, SOLUTION INTRAVENOUS PRN
Status: DISCONTINUED | OUTPATIENT
Start: 2023-09-28 | End: 2023-09-28 | Stop reason: ALTCHOICE

## 2023-09-28 RX ORDER — SODIUM CHLORIDE 0.9 % (FLUSH) 0.9 %
5-40 SYRINGE (ML) INJECTION PRN
Status: DISCONTINUED | OUTPATIENT
Start: 2023-09-28 | End: 2023-09-28 | Stop reason: ALTCHOICE

## 2023-09-28 RX ORDER — DIPHENHYDRAMINE HYDROCHLORIDE 50 MG/ML
50 INJECTION INTRAMUSCULAR; INTRAVENOUS
Status: CANCELLED | OUTPATIENT
Start: 2023-10-05

## 2023-09-28 RX ORDER — SODIUM CHLORIDE 9 MG/ML
5-250 INJECTION, SOLUTION INTRAVENOUS PRN
Status: CANCELLED | OUTPATIENT
Start: 2023-10-05

## 2023-09-28 RX ADMIN — SODIUM CHLORIDE, PRESERVATIVE FREE 10 ML: 5 INJECTION INTRAVENOUS at 10:34

## 2023-09-28 RX ADMIN — SODIUM CHLORIDE 125 MG: 9 INJECTION, SOLUTION INTRAVENOUS at 10:43

## 2023-09-28 RX ADMIN — SODIUM CHLORIDE 20 ML/HR: 9 INJECTION, SOLUTION INTRAVENOUS at 10:36

## 2023-09-28 RX ADMIN — SODIUM CHLORIDE, PRESERVATIVE FREE 10 ML: 5 INJECTION INTRAVENOUS at 11:44

## 2023-11-10 PROBLEM — I77.1 SUBCLAVIAN ARTERY STENOSIS, RIGHT (HCC): Status: ACTIVE | Noted: 2023-11-10

## 2023-11-10 PROBLEM — I73.9 PERIPHERAL VASCULAR DISEASE (HCC): Status: ACTIVE | Noted: 2023-11-10

## 2024-02-15 ENCOUNTER — OFFICE VISIT (OUTPATIENT)
Dept: VASCULAR SURGERY | Age: 59
End: 2024-02-15
Payer: MEDICARE

## 2024-02-15 VITALS — SYSTOLIC BLOOD PRESSURE: 114 MMHG | DIASTOLIC BLOOD PRESSURE: 66 MMHG | HEART RATE: 60 BPM

## 2024-02-15 DIAGNOSIS — I89.0 LYMPHEDEMA OF BOTH LOWER EXTREMITIES: ICD-10-CM

## 2024-02-15 DIAGNOSIS — R09.89 DECREASED DORSALIS PEDIS PULSE: ICD-10-CM

## 2024-02-15 DIAGNOSIS — I65.23 BILATERAL CAROTID ARTERY STENOSIS: ICD-10-CM

## 2024-02-15 DIAGNOSIS — Z86.73 H/O: CVA (CEREBROVASCULAR ACCIDENT): ICD-10-CM

## 2024-02-15 DIAGNOSIS — M79.89 LEG SWELLING: Primary | ICD-10-CM

## 2024-02-15 PROBLEM — I63.9 ACUTE CEREBROVASCULAR ACCIDENT (CVA) (HCC): Status: RESOLVED | Noted: 2022-10-27 | Resolved: 2024-02-15

## 2024-02-15 PROCEDURE — G8417 CALC BMI ABV UP PARAM F/U: HCPCS | Performed by: SURGERY

## 2024-02-15 PROCEDURE — 1036F TOBACCO NON-USER: CPT | Performed by: SURGERY

## 2024-02-15 PROCEDURE — 99204 OFFICE O/P NEW MOD 45 MIN: CPT | Performed by: SURGERY

## 2024-02-15 PROCEDURE — G8484 FLU IMMUNIZE NO ADMIN: HCPCS | Performed by: SURGERY

## 2024-02-15 PROCEDURE — G8427 DOCREV CUR MEDS BY ELIG CLIN: HCPCS | Performed by: SURGERY

## 2024-02-15 PROCEDURE — 3017F COLORECTAL CA SCREEN DOC REV: CPT | Performed by: SURGERY

## 2024-02-15 RX ORDER — FLUTICASONE PROPIONATE 50 MCG
SPRAY, SUSPENSION (ML) NASAL
COMMUNITY
Start: 2023-02-03

## 2024-02-15 RX ORDER — EPINEPHRINE 0.3 MG/.3ML
INJECTION SUBCUTANEOUS
COMMUNITY
Start: 2023-05-25

## 2024-02-15 RX ORDER — HYDROCHLOROTHIAZIDE 25 MG/1
25 TABLET ORAL EVERY MORNING
COMMUNITY
Start: 2024-02-01

## 2024-02-15 RX ORDER — FENTANYL 12.5 UG/1
PATCH TRANSDERMAL
COMMUNITY
Start: 2024-02-10

## 2024-02-15 RX ORDER — BUMETANIDE 1 MG/1
1 TABLET ORAL DAILY
COMMUNITY
Start: 2024-02-01 | End: 2024-02-15 | Stop reason: ALTCHOICE

## 2024-02-15 RX ORDER — VALSARTAN 160 MG/1
160 TABLET ORAL DAILY
COMMUNITY
Start: 2024-02-01

## 2024-02-15 NOTE — PROGRESS NOTES
Chief Complaint:   Chief Complaint   Patient presents with    Surgical Consult     Bilateral leg swelling, left is worse than right.  Has a history of two strokes.         HPI: Patient came to the office, for the evaluation of vascular status of the leg, redness and swelling of the legs on and off for the last several months, denies any history of deep vein thrombosis denies any vascular testing like venous ultrasound that was done recently    Patient also does get history of stroke, 2 years ago, did undergo angiogram and mechanical thrombectomy with occlusion of right middle cerebral artery but subsequently did not follow-up with her neurointerventional list    She tells me when she had the stroke, in fact the left side there is slowly getting better    History of chronic obstructive lung disease, hyperlipidemia, hypertension noted      Patient denies any focal lateralizing neurological symptoms like loss of speech, vision or loss of function of extremity    Patient can walk a few blocks , and denies any symptoms of rest pain    Allergies   Allergen Reactions    Tape [Adhesive Tape]      Tears skin    Pcn [Penicillins] Nausea And Vomiting       Current Outpatient Medications   Medication Sig Dispense Refill    fentaNYL (DURAGESIC) 12 MCG/HR apply TRANSDERMALLY every 72 hours AS NEEDED FOR PAIN      fluticasone (FLONASE) 50 MCG/ACT nasal spray Fluticasone Propionate Active SPRAY NASAL February 3rd, 2023 12:00am      hydroCHLOROthiazide (HYDRODIURIL) 25 MG tablet Take 1 tablet by mouth every morning      valsartan (DIOVAN) 160 MG tablet Take 1 tablet by mouth daily      EPINEPHrine (EPIPEN) 0.3 MG/0.3ML SOAJ injection inject 0.3 milliliters ( 0.3 milligrams ) intramuscularly in OUTE...  (REFER TO PRESCRIPTION NOTES).      aspirin 325 MG tablet Take 1 tablet by mouth daily      omeprazole (PRILOSEC) 40 MG delayed release capsule Take 1 capsule by mouth daily      triamcinolone (KENALOG) 0.1 % cream apply topically to

## 2024-03-08 ENCOUNTER — HOSPITAL ENCOUNTER (OUTPATIENT)
Dept: CARDIOLOGY | Age: 59
End: 2024-03-08
Payer: MEDICARE

## 2024-03-08 DIAGNOSIS — R09.89 DECREASED DORSALIS PEDIS PULSE: ICD-10-CM

## 2024-03-08 DIAGNOSIS — Z86.73 H/O: CVA (CEREBROVASCULAR ACCIDENT): ICD-10-CM

## 2024-03-08 DIAGNOSIS — M79.89 LEG SWELLING: ICD-10-CM

## 2024-03-08 DIAGNOSIS — I89.0 LYMPHEDEMA OF BOTH LOWER EXTREMITIES: ICD-10-CM

## 2024-03-08 LAB
VAS LEFT ABI: 1.19
VAS LEFT ANKLE BP: 155 MMHG
VAS LEFT ARM BP: 130 MMHG
VAS LEFT CALF PRESSURE: 167 MMHG
VAS LEFT CCA DIST EDV: 28.8 CM/S
VAS LEFT CCA DIST PSV: 97.8 CM/S
VAS LEFT CCA PROX EDV: 25.4 CM/S
VAS LEFT CCA PROX PSV: 111.2 CM/S
VAS LEFT DORSALIS PEDIS BP: 145 MMHG
VAS LEFT ECA EDV: 10.8 CM/S
VAS LEFT ECA PSV: 53.4 CM/S
VAS LEFT ICA DIST EDV: 39.7 CM/S
VAS LEFT ICA DIST PSV: 103.2 CM/S
VAS LEFT ICA MID EDV: 39.5 CM/S
VAS LEFT ICA MID PSV: 117 CM/S
VAS LEFT ICA PROX EDV: 34.1 CM/S
VAS LEFT ICA PROX PSV: 126.1 CM/S
VAS LEFT ICA/CCA PSV: 1.1 NO UNITS
VAS LEFT PTA BP: 155 MMHG
VAS LEFT TBI: 0.98
VAS LEFT THIGH PRESSURE: 183 MMHG
VAS LEFT TOE PRESSURE: 127 MMHG
VAS LEFT VERTEBRAL EDV: 25 CM/S
VAS LEFT VERTEBRAL PSV: 59.9 CM/S
VAS RIGHT ABI: 1.15
VAS RIGHT ANKLE BP: 150 MMHG
VAS RIGHT ARM BP: 127 MMHG
VAS RIGHT CALF PRESSURE: 168 MMHG
VAS RIGHT CCA DIST EDV: 29.7 CM/S
VAS RIGHT CCA DIST PSV: 96 CM/S
VAS RIGHT CCA PROX EDV: 22 CM/S
VAS RIGHT CCA PROX PSV: 97.3 CM/S
VAS RIGHT DORSALIS PEDIS BP: 149 MMHG
VAS RIGHT ECA EDV: 12.7 CM/S
VAS RIGHT ECA PSV: 120.6 CM/S
VAS RIGHT ICA DIST EDV: 38.7 CM/S
VAS RIGHT ICA DIST PSV: 112.2 CM/S
VAS RIGHT ICA MID EDV: 40.9 CM/S
VAS RIGHT ICA MID PSV: 138.4 CM/S
VAS RIGHT ICA PROX EDV: 34.5 CM/S
VAS RIGHT ICA PROX PSV: 92.9 CM/S
VAS RIGHT ICA/CCA PSV: 1.4 NO UNITS
VAS RIGHT PTA BP: 150 MMHG
VAS RIGHT TBI: 0.96
VAS RIGHT THIGH PRESSURE: 181 MMHG
VAS RIGHT TOE PRESSURE: 125 MMHG
VAS RIGHT VERTEBRAL EDV: 21.9 CM/S
VAS RIGHT VERTEBRAL PSV: 46.4 CM/S

## 2024-03-08 PROCEDURE — 93880 EXTRACRANIAL BILAT STUDY: CPT | Performed by: SURGERY

## 2024-03-08 PROCEDURE — 93880 EXTRACRANIAL BILAT STUDY: CPT

## 2024-03-08 PROCEDURE — 93970 EXTREMITY STUDY: CPT

## 2024-03-08 PROCEDURE — 93923 UPR/LXTR ART STDY 3+ LVLS: CPT

## 2024-03-08 PROCEDURE — 93923 UPR/LXTR ART STDY 3+ LVLS: CPT | Performed by: SURGERY

## 2024-03-08 PROCEDURE — 93971 EXTREMITY STUDY: CPT | Performed by: SURGERY

## 2024-03-11 ENCOUNTER — TELEPHONE (OUTPATIENT)
Dept: VASCULAR SURGERY | Age: 59
End: 2024-03-11

## 2024-03-11 DIAGNOSIS — I89.0 LYMPHEDEMA OF BOTH LOWER EXTREMITIES: Primary | ICD-10-CM

## 2024-03-11 NOTE — TELEPHONE ENCOUNTER
Mailed appointment card to patient for  a 2 year follow up with Dr. Ambriz.  Also, faxed referral for lymphedema therapy to [place].

## 2024-03-11 NOTE — TELEPHONE ENCOUNTER
Discussed with the patient regarding the extensive vascular workup she underwent    1.  Ankle-brachial index, done because of clinically diminished pulses, low it is normal with good arterial flow to both feet    2.  Venous ultrasound done for his leg swelling, normal, no DVT    3.  Carotid ultrasound done because of history of carotid stenosis mechanical thrombectomy of the carotid artery on the right with a medial tibial artery thrombectomy, approximately 40% stenosis on the right and 30% stenosis in the left overall no significant change compared to last study that was done a few years ago    Because of swelling of legs due to lymphedema, patient was recommended lymphedema therapy and once maximum improvement is noted, have the legs measured for compression device    As for the carotid stenosis concern, patient recommended follow-up appointment see me back in 2 years and to call if any strokelike symptoms    All her questions were answered

## 2024-06-06 ENCOUNTER — HOSPITAL ENCOUNTER (OUTPATIENT)
Age: 59
Discharge: HOME | End: 2024-06-06
Payer: MEDICARE

## 2024-06-06 LAB
25(OH)D3 SERPL-MCNC: 43.1 NG/ML (ref 30–100)
ALBUMIN SERPL-MCNC: 4.4 G/DL (ref 3.5–5.2)
ALP SERPL-CCNC: 139 U/L (ref 35–104)
ALT SERPL-CCNC: 19 U/L (ref 0–32)
ANION GAP SERPL CALCULATED.3IONS-SCNC: 12 MMOL/L (ref 7–16)
AST SERPL-CCNC: 20 U/L (ref 0–31)
BASOPHILS # BLD: 0.05 K/UL (ref 0–0.2)
BASOPHILS NFR BLD: 1 % (ref 0–2)
BILIRUB SERPL-MCNC: 0.2 MG/DL (ref 0–1.2)
BUN SERPL-MCNC: 33 MG/DL (ref 6–20)
CALCIUM SERPL-MCNC: 8.9 MG/DL (ref 8.6–10.2)
CHLORIDE SERPL-SCNC: 100 MMOL/L (ref 98–107)
CHOLEST SERPL-MCNC: 99 MG/DL
CO2 SERPL-SCNC: 25 MMOL/L (ref 22–29)
CREAT SERPL-MCNC: 1.1 MG/DL (ref 0.5–1)
EOSINOPHIL # BLD: 0.19 K/UL (ref 0.05–0.5)
EOSINOPHILS RELATIVE PERCENT: 3 % (ref 0–6)
ERYTHROCYTE [DISTWIDTH] IN BLOOD BY AUTOMATED COUNT: 14.2 % (ref 11.5–15)
FERRITIN SERPL-MCNC: 61 NG/ML
FOLATE SERPL-MCNC: >20 NG/ML (ref 4.8–24.2)
GFR, ESTIMATED: 57 ML/MIN/1.73M2
GLUCOSE SERPL-MCNC: 91 MG/DL (ref 74–99)
HBA1C MFR BLD: 6.2 % (ref 4–5.6)
HCT VFR BLD AUTO: 34.6 % (ref 34–48)
HDLC SERPL-MCNC: 50 MG/DL
HGB BLD-MCNC: 11.3 G/DL (ref 11.5–15.5)
IMM GRANULOCYTES # BLD AUTO: <0.03 K/UL (ref 0–0.58)
IMM GRANULOCYTES NFR BLD: 0 % (ref 0–5)
IRON SATN MFR SERPL: 18 % (ref 15–50)
IRON SERPL-MCNC: 56 UG/DL (ref 37–145)
LDLC SERPL CALC-MCNC: 31 MG/DL
LYMPHOCYTES NFR BLD: 1.59 K/UL (ref 1.5–4)
LYMPHOCYTES RELATIVE PERCENT: 21 % (ref 20–42)
MCH RBC QN AUTO: 28.3 PG (ref 26–35)
MCHC RBC AUTO-ENTMCNC: 32.7 G/DL (ref 32–34.5)
MCV RBC AUTO: 86.5 FL (ref 80–99.9)
MONOCYTES NFR BLD: 0.59 K/UL (ref 0.1–0.95)
MONOCYTES NFR BLD: 8 % (ref 2–12)
NEUTROPHILS NFR BLD: 67 % (ref 43–80)
NEUTS SEG NFR BLD: 5.07 K/UL (ref 1.8–7.3)
PLATELET # BLD AUTO: 304 K/UL (ref 130–450)
PMV BLD AUTO: 8.4 FL (ref 7–12)
POTASSIUM SERPL-SCNC: 3.9 MMOL/L (ref 3.5–5)
PROT SERPL-MCNC: 6.7 G/DL (ref 6.4–8.3)
RBC # BLD AUTO: 4 M/UL (ref 3.5–5.5)
SODIUM SERPL-SCNC: 137 MMOL/L (ref 132–146)
TIBC SERPL-MCNC: 309 UG/DL (ref 250–450)
TRIGL SERPL-MCNC: 88 MG/DL
VIT B12 SERPL-MCNC: 670 PG/ML (ref 211–946)
VLDLC SERPL CALC-MCNC: 18 MG/DL
WBC OTHER # BLD: 7.5 K/UL (ref 4.5–11.5)

## 2024-06-06 PROCEDURE — 80061 LIPID PANEL: CPT

## 2024-06-06 PROCEDURE — 85025 COMPLETE CBC W/AUTO DIFF WBC: CPT

## 2024-06-06 PROCEDURE — 82180 ASSAY OF ASCORBIC ACID: CPT

## 2024-06-06 PROCEDURE — 84255 ASSAY OF SELENIUM: CPT

## 2024-06-06 PROCEDURE — 83550 IRON BINDING TEST: CPT

## 2024-06-06 PROCEDURE — 36415 COLL VENOUS BLD VENIPUNCTURE: CPT

## 2024-06-06 PROCEDURE — 82306 VITAMIN D 25 HYDROXY: CPT

## 2024-06-06 PROCEDURE — 84425 ASSAY OF VITAMIN B-1: CPT

## 2024-06-06 PROCEDURE — 82728 ASSAY OF FERRITIN: CPT

## 2024-06-06 PROCEDURE — 84446 ASSAY OF VITAMIN E: CPT

## 2024-06-06 PROCEDURE — 80053 COMPREHEN METABOLIC PANEL: CPT

## 2024-06-06 PROCEDURE — 82746 ASSAY OF FOLIC ACID SERUM: CPT

## 2024-06-06 PROCEDURE — 82607 VITAMIN B-12: CPT

## 2024-06-06 PROCEDURE — 84630 ASSAY OF ZINC: CPT

## 2024-06-06 PROCEDURE — 83036 HEMOGLOBIN GLYCOSYLATED A1C: CPT

## 2024-06-06 PROCEDURE — 82525 ASSAY OF COPPER: CPT

## 2024-06-06 PROCEDURE — 84597 ASSAY OF VITAMIN K: CPT

## 2024-06-06 PROCEDURE — 84590 ASSAY OF VITAMIN A: CPT

## 2024-06-06 PROCEDURE — 83540 ASSAY OF IRON: CPT

## 2024-06-09 LAB
COPPER SERPL-MCNC: 100.3 UG/DL (ref 80–155)
SELENIUM SERPL-MCNC: 87.8 UG/L (ref 23–190)
ZINC SERPL-MCNC: 56.6 UG/DL (ref 60–120)

## 2024-06-10 LAB
RETINYL PALMITATE: 0.02 MG/L (ref 0–0.1)
VITAMIN A LEVEL: 1.38 MG/L (ref 0.3–1.2)
VITAMIN A, INTERP: ABNORMAL

## 2024-06-11 LAB — VIT C SERPL-MCNC: 127 UMOL/L (ref 23–114)

## 2024-06-12 LAB
ALPHA-TOCOPHEROL: 7.9 MG/L (ref 5.5–18)
GAMMA-TOCOPHEROL: 1.3 MG/L (ref 0–6)
PHYTONADIONE SERPL-MCNC: 1.03 NMOL/L (ref 0.22–4.88)
VIT B1 PYROPHOSHATE BLD-SCNC: 245 NMOL/L (ref 70–180)

## 2024-07-12 ENCOUNTER — HOSPITAL ENCOUNTER (EMERGENCY)
Age: 59
Discharge: HOME | End: 2024-07-12
Attending: STUDENT IN AN ORGANIZED HEALTH CARE EDUCATION/TRAINING PROGRAM
Payer: MEDICARE

## 2024-07-12 ENCOUNTER — APPOINTMENT (OUTPATIENT)
Dept: CT IMAGING | Age: 59
End: 2024-07-12
Payer: MEDICARE

## 2024-07-12 VITALS
WEIGHT: 210 LBS | RESPIRATION RATE: 18 BRPM | SYSTOLIC BLOOD PRESSURE: 108 MMHG | BODY MASS INDEX: 31.93 KG/M2 | OXYGEN SATURATION: 97 % | HEART RATE: 92 BPM | DIASTOLIC BLOOD PRESSURE: 66 MMHG | TEMPERATURE: 97.6 F

## 2024-07-12 DIAGNOSIS — M54.50 ACUTE EXACERBATION OF CHRONIC LOW BACK PAIN: Primary | ICD-10-CM

## 2024-07-12 DIAGNOSIS — G89.29 ACUTE EXACERBATION OF CHRONIC LOW BACK PAIN: Primary | ICD-10-CM

## 2024-07-12 LAB
BILIRUB UR QL STRIP: NEGATIVE
CLARITY UR: CLEAR
COLOR UR: YELLOW
GLUCOSE UR STRIP-MCNC: NEGATIVE MG/DL
HGB UR QL STRIP.AUTO: NEGATIVE
KETONES UR STRIP-MCNC: NEGATIVE MG/DL
LEUKOCYTE ESTERASE UR QL STRIP: NEGATIVE
NITRITE UR QL STRIP: NEGATIVE
PH UR STRIP: 5 [PH] (ref 5–9)
PROT UR STRIP-MCNC: NEGATIVE MG/DL
RBC #/AREA URNS HPF: NORMAL /HPF
SP GR UR STRIP: 1.02 (ref 1–1.03)
UROBILINOGEN UR STRIP-ACNC: 0.2 EU/DL (ref 0–1)
WBC #/AREA URNS HPF: NORMAL /HPF

## 2024-07-12 PROCEDURE — 2500000003 HC RX 250 WO HCPCS: Performed by: STUDENT IN AN ORGANIZED HEALTH CARE EDUCATION/TRAINING PROGRAM

## 2024-07-12 PROCEDURE — 81001 URINALYSIS AUTO W/SCOPE: CPT

## 2024-07-12 PROCEDURE — 72128 CT CHEST SPINE W/O DYE: CPT

## 2024-07-12 PROCEDURE — 96372 THER/PROPH/DIAG INJ SC/IM: CPT

## 2024-07-12 PROCEDURE — 72131 CT LUMBAR SPINE W/O DYE: CPT

## 2024-07-12 PROCEDURE — 99284 EMERGENCY DEPT VISIT MOD MDM: CPT

## 2024-07-12 PROCEDURE — 6360000002 HC RX W HCPCS: Performed by: STUDENT IN AN ORGANIZED HEALTH CARE EDUCATION/TRAINING PROGRAM

## 2024-07-12 PROCEDURE — 6370000000 HC RX 637 (ALT 250 FOR IP): Performed by: STUDENT IN AN ORGANIZED HEALTH CARE EDUCATION/TRAINING PROGRAM

## 2024-07-12 RX ORDER — FENTANYL CITRATE 50 UG/ML
100 INJECTION, SOLUTION INTRAMUSCULAR; INTRAVENOUS ONCE
Status: COMPLETED | OUTPATIENT
Start: 2024-07-12 | End: 2024-07-12

## 2024-07-12 RX ORDER — HYDROMORPHONE HYDROCHLORIDE 1 MG/ML
1 INJECTION, SOLUTION INTRAMUSCULAR; INTRAVENOUS; SUBCUTANEOUS ONCE
Status: COMPLETED | OUTPATIENT
Start: 2024-07-12 | End: 2024-07-12

## 2024-07-12 RX ORDER — METHOCARBAMOL 500 MG/1
1000 TABLET, FILM COATED ORAL ONCE
Status: COMPLETED | OUTPATIENT
Start: 2024-07-12 | End: 2024-07-12

## 2024-07-12 RX ORDER — BUMETANIDE 1 MG/1
1 TABLET ORAL DAILY
COMMUNITY

## 2024-07-12 RX ORDER — DEXAMETHASONE SODIUM PHOSPHATE 10 MG/ML
6 INJECTION INTRAMUSCULAR; INTRAVENOUS ONCE
Status: COMPLETED | OUTPATIENT
Start: 2024-07-12 | End: 2024-07-12

## 2024-07-12 RX ORDER — OXYCODONE HYDROCHLORIDE 5 MG/1
5 TABLET ORAL ONCE
Status: COMPLETED | OUTPATIENT
Start: 2024-07-12 | End: 2024-07-12

## 2024-07-12 RX ORDER — KETOROLAC TROMETHAMINE 30 MG/ML
30 INJECTION, SOLUTION INTRAMUSCULAR; INTRAVENOUS ONCE
Status: COMPLETED | OUTPATIENT
Start: 2024-07-12 | End: 2024-07-12

## 2024-07-12 RX ORDER — BACLOFEN 10 MG/1
5 TABLET ORAL 3 TIMES DAILY
COMMUNITY

## 2024-07-12 RX ORDER — LIDOCAINE 4 G/G
1 PATCH TOPICAL DAILY
Qty: 30 PATCH | Refills: 0 | Status: SHIPPED | OUTPATIENT
Start: 2024-07-12 | End: 2024-08-11

## 2024-07-12 RX ORDER — LIDOCAINE 4 G/G
1 PATCH TOPICAL DAILY
Status: DISCONTINUED | OUTPATIENT
Start: 2024-07-12 | End: 2024-07-12 | Stop reason: HOSPADM

## 2024-07-12 RX ORDER — OXYCODONE HYDROCHLORIDE AND ACETAMINOPHEN 5; 325 MG/1; MG/1
1 TABLET ORAL ONCE
Status: COMPLETED | OUTPATIENT
Start: 2024-07-12 | End: 2024-07-12

## 2024-07-12 RX ORDER — TRAMADOL HYDROCHLORIDE 300 MG/1
300 CAPSULE ORAL DAILY
COMMUNITY

## 2024-07-12 RX ADMIN — OXYCODONE HYDROCHLORIDE AND ACETAMINOPHEN 1 TABLET: 5; 325 TABLET ORAL at 14:51

## 2024-07-12 RX ADMIN — HYDROMORPHONE HYDROCHLORIDE 1 MG: 1 INJECTION, SOLUTION INTRAMUSCULAR; INTRAVENOUS; SUBCUTANEOUS at 15:28

## 2024-07-12 RX ADMIN — DEXAMETHASONE SODIUM PHOSPHATE 6 MG: 10 INJECTION INTRAMUSCULAR; INTRAVENOUS at 13:44

## 2024-07-12 RX ADMIN — METHOCARBAMOL 1000 MG: 500 TABLET ORAL at 13:41

## 2024-07-12 RX ADMIN — FENTANYL CITRATE 100 MCG: 50 INJECTION INTRAMUSCULAR; INTRAVENOUS at 13:42

## 2024-07-12 RX ADMIN — OXYCODONE 5 MG: 5 TABLET ORAL at 17:47

## 2024-07-12 RX ADMIN — KETOROLAC TROMETHAMINE 30 MG: 30 INJECTION, SOLUTION INTRAMUSCULAR at 13:49

## 2024-07-12 ASSESSMENT — ENCOUNTER SYMPTOMS
COUGH: 0
SHORTNESS OF BREATH: 0
VOMITING: 0
PHOTOPHOBIA: 0
DIARRHEA: 0
CHEST TIGHTNESS: 0
NAUSEA: 0
ABDOMINAL PAIN: 0
ABDOMINAL DISTENTION: 0
BACK PAIN: 1

## 2024-07-12 ASSESSMENT — PAIN DESCRIPTION - FREQUENCY: FREQUENCY: CONTINUOUS

## 2024-07-12 ASSESSMENT — PAIN DESCRIPTION - DESCRIPTORS: DESCRIPTORS: ACHING;SHARP;SHOOTING;SORE;SPASM

## 2024-07-12 ASSESSMENT — LIFESTYLE VARIABLES
HOW OFTEN DO YOU HAVE A DRINK CONTAINING ALCOHOL: NEVER
HOW MANY STANDARD DRINKS CONTAINING ALCOHOL DO YOU HAVE ON A TYPICAL DAY: PATIENT DOES NOT DRINK

## 2024-07-12 ASSESSMENT — PAIN SCALES - GENERAL
PAINLEVEL_OUTOF10: 8
PAINLEVEL_OUTOF10: 10
PAINLEVEL_OUTOF10: 7
PAINLEVEL_OUTOF10: 8
PAINLEVEL_OUTOF10: 7

## 2024-07-12 ASSESSMENT — PAIN DESCRIPTION - PAIN TYPE: TYPE: ACUTE PAIN

## 2024-07-12 ASSESSMENT — PAIN DESCRIPTION - LOCATION: LOCATION: BACK

## 2024-07-12 ASSESSMENT — PAIN DESCRIPTION - ORIENTATION: ORIENTATION: MID;LOWER

## 2024-07-12 ASSESSMENT — PAIN - FUNCTIONAL ASSESSMENT
PAIN_FUNCTIONAL_ASSESSMENT: 0-10
PAIN_FUNCTIONAL_ASSESSMENT: 0-10

## 2024-07-12 ASSESSMENT — PAIN DESCRIPTION - ONSET: ONSET: ON-GOING

## 2024-07-12 NOTE — DISCHARGE INSTRUCTIONS
CT THORACIC SPINE WO CONTRAST   Final Result   1. No change in old mild T11 and old minimal T7 compression fractures.  No   new compression fractures.   2. Stable severe T11-12 disc degenerative disease.   3. Stable moderate hiatal hernia.         CT LUMBAR SPINE WO CONTRAST   Final Result   1. No acute abnormality of the lumbar spine.   2. Stable mild anterior wedging of the T11 vertebral body, a mild compression   fracture of indeterminate age.   3. Stable mild scoliosis of the lumbar spine convex towards the left with the   apex at the L3-4 level.   4. Stable mild spinal stenosis at L1-2.   5. Stable mild left lateral foraminal zone disc bulging with posterior   osteophytic ridging at L5-S1, impinging upon the left L5 nerve root, without   compression of the nerve root.   6. Stable severe disc degenerative disease at T11-12, L2-3, and L4-5.

## 2024-07-12 NOTE — ED PROVIDER NOTES
Jacquelin Arreguin is a 59-year-old female who presents emergency department with concern for thoracic and lumbar back pain.  Patient states she has a history of chronic back pain in this area.  Patient is on tramadol for her chronic back pain.  Patient states she has had previous exacerbations of her chronic back pain and has been seen previously for the same symptoms patient's exacerbation of pain feels exactly the same as her previous.  Patient denies trauma.  Patient denies fever, chills, chest pain or shortness of breath patient was standing for a while trying to cook yesterday and pain became more severe.  Patient denies dysuria or blood in stool denies nausea vomiting or diarrhea.  Patient's pain is worse in different positions is worse if sitting patient denies chest pain or shortness of breath    The history is provided by the patient and medical records.        Review of Systems   Constitutional:  Negative for chills, diaphoresis, fatigue and fever.   Eyes:  Negative for photophobia and visual disturbance.   Respiratory:  Negative for cough, chest tightness and shortness of breath.    Cardiovascular:  Negative for chest pain, palpitations and leg swelling.   Gastrointestinal:  Negative for abdominal distention, abdominal pain, diarrhea, nausea and vomiting.   Genitourinary:  Negative for dysuria.   Musculoskeletal:  Positive for back pain. Negative for neck pain and neck stiffness.   Skin:  Negative for pallor and rash.   Neurological:  Negative for headaches.   Psychiatric/Behavioral:  Negative for confusion.         Physical Exam  Vitals and nursing note reviewed.   Constitutional:       Appearance: She is not ill-appearing.      Comments: Patient appears uncomfortable due to pain   HENT:      Head: Normocephalic and atraumatic.   Eyes:      General: No scleral icterus.     Conjunctiva/sclera: Conjunctivae normal.      Pupils: Pupils are equal, round, and reactive to light.   Cardiovascular:      Rate and

## 2024-10-17 ENCOUNTER — APPOINTMENT (OUTPATIENT)
Dept: ULTRASOUND IMAGING | Age: 59
End: 2024-10-17
Payer: MEDICARE

## 2024-10-17 ENCOUNTER — HOSPITAL ENCOUNTER (INPATIENT)
Age: 59
LOS: 2 days | Discharge: HOME | End: 2024-10-19
Attending: EMERGENCY MEDICINE | Admitting: HOSPITALIST
Payer: MEDICARE

## 2024-10-17 ENCOUNTER — APPOINTMENT (OUTPATIENT)
Dept: GENERAL RADIOLOGY | Age: 59
End: 2024-10-17
Payer: MEDICARE

## 2024-10-17 DIAGNOSIS — E86.0 DEHYDRATION: ICD-10-CM

## 2024-10-17 DIAGNOSIS — N17.9 ACUTE RENAL FAILURE, UNSPECIFIED ACUTE RENAL FAILURE TYPE (HCC): Primary | ICD-10-CM

## 2024-10-17 LAB
ALBUMIN SERPL-MCNC: 4.1 G/DL (ref 3.5–5.2)
ALP SERPL-CCNC: 93 U/L (ref 35–104)
ALT SERPL-CCNC: 46 U/L (ref 0–32)
AMPHET UR QL SCN: NEGATIVE
ANION GAP SERPL CALCULATED.3IONS-SCNC: 20 MMOL/L (ref 7–16)
AST SERPL-CCNC: 95 U/L (ref 0–31)
B-OH-BUTYR SERPL-MCNC: 1.78 MMOL/L (ref 0.02–0.27)
BARBITURATES UR QL SCN: NEGATIVE
BENZODIAZ UR QL: NEGATIVE
BILIRUB SERPL-MCNC: 0.5 MG/DL (ref 0–1.2)
BILIRUB UR QL STRIP: NEGATIVE
BUN SERPL-MCNC: 84 MG/DL (ref 6–20)
BUPRENORPHINE UR QL: NEGATIVE
CALCIUM SERPL-MCNC: 9.3 MG/DL (ref 8.6–10.2)
CANNABINOIDS UR QL SCN: NEGATIVE
CHLORIDE SERPL-SCNC: 94 MMOL/L (ref 98–107)
CHOLEST SERPL-MCNC: 79 MG/DL
CLARITY UR: CLEAR
CO2 SERPL-SCNC: 21 MMOL/L (ref 22–29)
COCAINE UR QL SCN: NEGATIVE
COLOR UR: YELLOW
CREAT SERPL-MCNC: 2.7 MG/DL (ref 0.5–1)
CREAT UR-MCNC: 79.2 MG/DL (ref 29–226)
CREAT UR-MCNC: 79.2 MG/DL (ref 29–226)
CREAT UR-MCNC: 79.7 MG/DL (ref 29–226)
EKG ATRIAL RATE: 79 BPM
EKG P AXIS: -24 DEGREES
EKG P-R INTERVAL: 166 MS
EKG Q-T INTERVAL: 412 MS
EKG QRS DURATION: 102 MS
EKG QTC CALCULATION (BAZETT): 472 MS
EKG R AXIS: -16 DEGREES
EKG T AXIS: -14 DEGREES
EKG VENTRICULAR RATE: 79 BPM
ERYTHROCYTE [DISTWIDTH] IN BLOOD BY AUTOMATED COUNT: 13.7 % (ref 11.5–15)
ETHANOLAMINE SERPL-MCNC: <10 MG/DL (ref 0–0.08)
FENTANYL UR QL: NEGATIVE
GFR, ESTIMATED: 20 ML/MIN/1.73M2
GLUCOSE SERPL-MCNC: 86 MG/DL (ref 74–99)
GLUCOSE UR STRIP-MCNC: NEGATIVE MG/DL
HCT VFR BLD AUTO: 31 % (ref 34–48)
HDLC SERPL-MCNC: 44 MG/DL
HGB BLD-MCNC: 10.3 G/DL (ref 11.5–15.5)
HGB UR QL STRIP.AUTO: NEGATIVE
KETONES UR STRIP-MCNC: ABNORMAL MG/DL
LACTATE BLDV-SCNC: 1.8 MMOL/L (ref 0.5–1.9)
LDLC SERPL CALC-MCNC: 25 MG/DL
LEUKOCYTE ESTERASE UR QL STRIP: NEGATIVE
MAGNESIUM SERPL-MCNC: 2.3 MG/DL (ref 1.6–2.6)
MCH RBC QN AUTO: 29.2 PG (ref 26–35)
MCHC RBC AUTO-ENTMCNC: 33.2 G/DL (ref 32–34.5)
MCV RBC AUTO: 87.8 FL (ref 80–99.9)
METHADONE UR QL: NEGATIVE
MICROALBUMIN UR-MCNC: 19 MG/L (ref 0–19)
MICROALBUMIN/CREAT UR-RTO: 24 MCG/MG CREAT (ref 0–30)
NITRITE UR QL STRIP: NEGATIVE
OPIATES UR QL SCN: POSITIVE
OSMOLALITY SERPL: 308 MOSM/KG (ref 285–310)
OSMOLALITY UR: 364 MOSM/KG (ref 300–900)
OXYCODONE UR QL SCN: NEGATIVE
PCP UR QL SCN: NEGATIVE
PH UR STRIP: 5.5 [PH] (ref 5–9)
PLATELET # BLD AUTO: 335 K/UL (ref 130–450)
PMV BLD AUTO: 8.5 FL (ref 7–12)
POTASSIUM SERPL-SCNC: 3.3 MMOL/L (ref 3.5–5)
PROT SERPL-MCNC: 6.6 G/DL (ref 6.4–8.3)
PROT UR STRIP-MCNC: NEGATIVE MG/DL
RBC # BLD AUTO: 3.53 M/UL (ref 3.5–5.5)
RBC #/AREA URNS HPF: ABNORMAL /HPF
SODIUM SERPL-SCNC: 135 MMOL/L (ref 132–146)
SODIUM UR-SCNC: 41 MMOL/L
SP GR UR STRIP: 1.01 (ref 1–1.03)
TEST INFORMATION: ABNORMAL
TOTAL PROTEIN, URINE: 12 MG/DL (ref 0–12)
TOTAL PROTEIN, URINE: 12 MG/DL (ref 0–12)
TRIGL SERPL-MCNC: 48 MG/DL
URINE TOTAL PROTEIN CREATININE RATIO: 0.15 (ref 0–0.2)
UROBILINOGEN UR STRIP-ACNC: 0.2 EU/DL (ref 0–1)
UUN UR-MCNC: 578 MG/DL (ref 800–1666)
VLDLC SERPL CALC-MCNC: 10 MG/DL
WBC #/AREA URNS HPF: ABNORMAL /HPF
WBC OTHER # BLD: 8.6 K/UL (ref 4.5–11.5)

## 2024-10-17 PROCEDURE — 86039 ANTINUCLEAR ANTIBODIES (ANA): CPT

## 2024-10-17 PROCEDURE — 83605 ASSAY OF LACTIC ACID: CPT

## 2024-10-17 PROCEDURE — 86038 ANTINUCLEAR ANTIBODIES: CPT

## 2024-10-17 PROCEDURE — 96360 HYDRATION IV INFUSION INIT: CPT

## 2024-10-17 PROCEDURE — 71045 X-RAY EXAM CHEST 1 VIEW: CPT

## 2024-10-17 PROCEDURE — 84155 ASSAY OF PROTEIN SERUM: CPT

## 2024-10-17 PROCEDURE — 83930 ASSAY OF BLOOD OSMOLALITY: CPT

## 2024-10-17 PROCEDURE — 6370000000 HC RX 637 (ALT 250 FOR IP): Performed by: INTERNAL MEDICINE

## 2024-10-17 PROCEDURE — 6370000000 HC RX 637 (ALT 250 FOR IP): Performed by: HOSPITALIST

## 2024-10-17 PROCEDURE — 80307 DRUG TEST PRSMV CHEM ANLYZR: CPT

## 2024-10-17 PROCEDURE — 84540 ASSAY OF URINE/UREA-N: CPT

## 2024-10-17 PROCEDURE — 85027 COMPLETE CBC AUTOMATED: CPT

## 2024-10-17 PROCEDURE — 76770 US EXAM ABDO BACK WALL COMP: CPT

## 2024-10-17 PROCEDURE — 84165 PROTEIN E-PHORESIS SERUM: CPT

## 2024-10-17 PROCEDURE — 84300 ASSAY OF URINE SODIUM: CPT

## 2024-10-17 PROCEDURE — 82043 UR ALBUMIN QUANTITATIVE: CPT

## 2024-10-17 PROCEDURE — 81001 URINALYSIS AUTO W/SCOPE: CPT

## 2024-10-17 PROCEDURE — G0480 DRUG TEST DEF 1-7 CLASSES: HCPCS

## 2024-10-17 PROCEDURE — 80053 COMPREHEN METABOLIC PANEL: CPT

## 2024-10-17 PROCEDURE — 2580000003 HC RX 258: Performed by: EMERGENCY MEDICINE

## 2024-10-17 PROCEDURE — 1200000000 HC SEMI PRIVATE

## 2024-10-17 PROCEDURE — 97165 OT EVAL LOW COMPLEX 30 MIN: CPT

## 2024-10-17 PROCEDURE — 87389 HIV-1 AG W/HIV-1&-2 AB AG IA: CPT

## 2024-10-17 PROCEDURE — 2580000003 HC RX 258: Performed by: HOSPITALIST

## 2024-10-17 PROCEDURE — 87205 SMEAR GRAM STAIN: CPT

## 2024-10-17 PROCEDURE — 83735 ASSAY OF MAGNESIUM: CPT

## 2024-10-17 PROCEDURE — 99285 EMERGENCY DEPT VISIT HI MDM: CPT

## 2024-10-17 PROCEDURE — 93010 ELECTROCARDIOGRAM REPORT: CPT | Performed by: INTERNAL MEDICINE

## 2024-10-17 PROCEDURE — 6360000002 HC RX W HCPCS: Performed by: HOSPITALIST

## 2024-10-17 PROCEDURE — 93005 ELECTROCARDIOGRAM TRACING: CPT | Performed by: EMERGENCY MEDICINE

## 2024-10-17 PROCEDURE — 83935 ASSAY OF URINE OSMOLALITY: CPT

## 2024-10-17 PROCEDURE — 80074 ACUTE HEPATITIS PANEL: CPT

## 2024-10-17 PROCEDURE — 82570 ASSAY OF URINE CREATININE: CPT

## 2024-10-17 PROCEDURE — 82010 KETONE BODYS QUAN: CPT

## 2024-10-17 PROCEDURE — 80061 LIPID PANEL: CPT

## 2024-10-17 PROCEDURE — 84156 ASSAY OF PROTEIN URINE: CPT

## 2024-10-17 RX ORDER — BUMETANIDE 1 MG/1
1 TABLET ORAL DAILY
Status: DISCONTINUED | OUTPATIENT
Start: 2024-10-17 | End: 2024-10-19 | Stop reason: HOSPADM

## 2024-10-17 RX ORDER — BENZONATATE 100 MG/1
200 CAPSULE ORAL EVERY 8 HOURS PRN
Status: DISCONTINUED | OUTPATIENT
Start: 2024-10-17 | End: 2024-10-19 | Stop reason: HOSPADM

## 2024-10-17 RX ORDER — PRAMIPEXOLE DIHYDROCHLORIDE 0.5 MG/1
0.5 TABLET ORAL NIGHTLY
COMMUNITY

## 2024-10-17 RX ORDER — POLYETHYLENE GLYCOL 3350 17 G/17G
17 POWDER, FOR SOLUTION ORAL DAILY PRN
Status: DISCONTINUED | OUTPATIENT
Start: 2024-10-17 | End: 2024-10-19 | Stop reason: HOSPADM

## 2024-10-17 RX ORDER — SODIUM CHLORIDE, SODIUM LACTATE, POTASSIUM CHLORIDE, CALCIUM CHLORIDE 600; 310; 30; 20 MG/100ML; MG/100ML; MG/100ML; MG/100ML
INJECTION, SOLUTION INTRAVENOUS CONTINUOUS
Status: ACTIVE | OUTPATIENT
Start: 2024-10-17 | End: 2024-10-18

## 2024-10-17 RX ORDER — ONDANSETRON 4 MG/1
4 TABLET, ORALLY DISINTEGRATING ORAL EVERY 8 HOURS PRN
Status: DISCONTINUED | OUTPATIENT
Start: 2024-10-17 | End: 2024-10-19 | Stop reason: HOSPADM

## 2024-10-17 RX ORDER — ACETAMINOPHEN 500 MG
1500 TABLET ORAL 3 TIMES DAILY
COMMUNITY

## 2024-10-17 RX ORDER — MORPHINE SULFATE 15 MG/1
15 TABLET, FILM COATED, EXTENDED RELEASE ORAL EVERY 12 HOURS
COMMUNITY
Start: 2024-09-22

## 2024-10-17 RX ORDER — EZETIMIBE 10 MG/1
10 TABLET ORAL NIGHTLY
Status: DISCONTINUED | OUTPATIENT
Start: 2024-10-17 | End: 2024-10-19 | Stop reason: HOSPADM

## 2024-10-17 RX ORDER — 0.9 % SODIUM CHLORIDE 0.9 %
500 INTRAVENOUS SOLUTION INTRAVENOUS ONCE
Status: COMPLETED | OUTPATIENT
Start: 2024-10-17 | End: 2024-10-17

## 2024-10-17 RX ORDER — ENOXAPARIN SODIUM 100 MG/ML
30 INJECTION SUBCUTANEOUS DAILY
Status: DISCONTINUED | OUTPATIENT
Start: 2024-10-17 | End: 2024-10-19 | Stop reason: DRUGHIGH

## 2024-10-17 RX ORDER — FLUTICASONE PROPIONATE 50 MCG
1 SPRAY, SUSPENSION (ML) NASAL DAILY
Status: DISCONTINUED | OUTPATIENT
Start: 2024-10-17 | End: 2024-10-17 | Stop reason: CLARIF

## 2024-10-17 RX ORDER — FOLIC ACID 0.8 MG
800 TABLET ORAL EVERY MORNING
COMMUNITY

## 2024-10-17 RX ORDER — FLUTICASONE PROPIONATE 50 MCG
1 SPRAY, SUSPENSION (ML) NASAL NIGHTLY PRN
Status: DISCONTINUED | OUTPATIENT
Start: 2024-10-17 | End: 2024-10-19 | Stop reason: HOSPADM

## 2024-10-17 RX ORDER — TRAMADOL HYDROCHLORIDE 50 MG/1
50 TABLET ORAL 4 TIMES DAILY
Status: DISCONTINUED | OUTPATIENT
Start: 2024-10-17 | End: 2024-10-17

## 2024-10-17 RX ORDER — BENZONATATE 200 MG/1
200 CAPSULE ORAL EVERY 8 HOURS PRN
COMMUNITY
Start: 2024-10-09

## 2024-10-17 RX ORDER — LANOLIN ALCOHOL/MO/W.PET/CERES
100 CREAM (GRAM) TOPICAL DAILY
Status: DISCONTINUED | OUTPATIENT
Start: 2024-10-17 | End: 2024-10-19 | Stop reason: HOSPADM

## 2024-10-17 RX ORDER — ALBUTEROL SULFATE 90 UG/1
2 INHALANT RESPIRATORY (INHALATION) EVERY 6 HOURS PRN
COMMUNITY

## 2024-10-17 RX ORDER — PANTOPRAZOLE SODIUM 40 MG/1
40 TABLET, DELAYED RELEASE ORAL
Status: DISCONTINUED | OUTPATIENT
Start: 2024-10-18 | End: 2024-10-19 | Stop reason: HOSPADM

## 2024-10-17 RX ORDER — ACETAMINOPHEN 325 MG/1
650 TABLET ORAL EVERY 6 HOURS PRN
Status: DISCONTINUED | OUTPATIENT
Start: 2024-10-17 | End: 2024-10-19 | Stop reason: HOSPADM

## 2024-10-17 RX ORDER — MAGNESIUM CHLORIDE 71.5 G/G
1 TABLET ORAL NIGHTLY
COMMUNITY

## 2024-10-17 RX ORDER — SODIUM CHLORIDE 0.9 % (FLUSH) 0.9 %
5-40 SYRINGE (ML) INJECTION EVERY 12 HOURS SCHEDULED
Status: DISCONTINUED | OUTPATIENT
Start: 2024-10-17 | End: 2024-10-19 | Stop reason: HOSPADM

## 2024-10-17 RX ORDER — SEMAGLUTIDE 1 MG/.5ML
1 INJECTION, SOLUTION SUBCUTANEOUS WEEKLY
COMMUNITY
Start: 2024-10-14

## 2024-10-17 RX ORDER — ONDANSETRON 2 MG/ML
4 INJECTION INTRAMUSCULAR; INTRAVENOUS EVERY 6 HOURS PRN
Status: DISCONTINUED | OUTPATIENT
Start: 2024-10-17 | End: 2024-10-19 | Stop reason: HOSPADM

## 2024-10-17 RX ORDER — ASPIRIN 325 MG
325 TABLET ORAL DAILY
Status: DISCONTINUED | OUTPATIENT
Start: 2024-10-17 | End: 2024-10-18

## 2024-10-17 RX ORDER — SODIUM CHLORIDE 9 MG/ML
INJECTION, SOLUTION INTRAVENOUS ONCE
Status: COMPLETED | OUTPATIENT
Start: 2024-10-17 | End: 2024-10-17

## 2024-10-17 RX ORDER — SODIUM CHLORIDE 0.9 % (FLUSH) 0.9 %
5-40 SYRINGE (ML) INJECTION PRN
Status: DISCONTINUED | OUTPATIENT
Start: 2024-10-17 | End: 2024-10-19 | Stop reason: HOSPADM

## 2024-10-17 RX ORDER — BACLOFEN 10 MG/1
5 TABLET ORAL 3 TIMES DAILY
Status: DISCONTINUED | OUTPATIENT
Start: 2024-10-17 | End: 2024-10-19 | Stop reason: HOSPADM

## 2024-10-17 RX ORDER — ATORVASTATIN CALCIUM 40 MG/1
80 TABLET, FILM COATED ORAL NIGHTLY
Status: DISCONTINUED | OUTPATIENT
Start: 2024-10-17 | End: 2024-10-19 | Stop reason: HOSPADM

## 2024-10-17 RX ORDER — CLOPIDOGREL BISULFATE 75 MG/1
75 TABLET ORAL DAILY
Status: DISCONTINUED | OUTPATIENT
Start: 2024-10-17 | End: 2024-10-19 | Stop reason: HOSPADM

## 2024-10-17 RX ORDER — UBIDECARENONE 75 MG
50 CAPSULE ORAL EVERY MORNING
COMMUNITY

## 2024-10-17 RX ORDER — CLOPIDOGREL BISULFATE 75 MG/1
75 TABLET ORAL EVERY MORNING
COMMUNITY

## 2024-10-17 RX ORDER — EZETIMIBE 10 MG/1
10 TABLET ORAL NIGHTLY
COMMUNITY

## 2024-10-17 RX ORDER — FOLIC ACID 1 MG/1
1 TABLET ORAL DAILY
Status: DISCONTINUED | OUTPATIENT
Start: 2024-10-17 | End: 2024-10-19 | Stop reason: HOSPADM

## 2024-10-17 RX ORDER — DOXYCYCLINE 100 MG/1
100 TABLET ORAL EVERY 12 HOURS
Status: ON HOLD | COMMUNITY
Start: 2024-10-09 | End: 2024-10-19 | Stop reason: HOSPADM

## 2024-10-17 RX ORDER — ALBUTEROL SULFATE 0.83 MG/ML
2.5 SOLUTION RESPIRATORY (INHALATION) EVERY 6 HOURS PRN
Status: DISCONTINUED | OUTPATIENT
Start: 2024-10-17 | End: 2024-10-19 | Stop reason: HOSPADM

## 2024-10-17 RX ORDER — MORPHINE SULFATE 15 MG/1
15 TABLET, FILM COATED, EXTENDED RELEASE ORAL EVERY 12 HOURS
Status: DISCONTINUED | OUTPATIENT
Start: 2024-10-17 | End: 2024-10-19 | Stop reason: HOSPADM

## 2024-10-17 RX ORDER — VALSARTAN 160 MG/1
160 TABLET ORAL DAILY
Status: DISCONTINUED | OUTPATIENT
Start: 2024-10-17 | End: 2024-10-19 | Stop reason: HOSPADM

## 2024-10-17 RX ORDER — ASCORBIC ACID 500 MG
500 TABLET ORAL EVERY MORNING
COMMUNITY

## 2024-10-17 RX ORDER — SODIUM CHLORIDE 9 MG/ML
INJECTION, SOLUTION INTRAVENOUS PRN
Status: DISCONTINUED | OUTPATIENT
Start: 2024-10-17 | End: 2024-10-19 | Stop reason: HOSPADM

## 2024-10-17 RX ORDER — HYDROCHLOROTHIAZIDE 25 MG/1
25 TABLET ORAL EVERY MORNING
Status: DISCONTINUED | OUTPATIENT
Start: 2024-10-18 | End: 2024-10-19 | Stop reason: HOSPADM

## 2024-10-17 RX ORDER — TRAMADOL HYDROCHLORIDE 50 MG/1
50 TABLET ORAL EVERY 6 HOURS PRN
Status: DISCONTINUED | OUTPATIENT
Start: 2024-10-17 | End: 2024-10-19 | Stop reason: HOSPADM

## 2024-10-17 RX ORDER — ACETAMINOPHEN 650 MG/1
650 SUPPOSITORY RECTAL EVERY 6 HOURS PRN
Status: DISCONTINUED | OUTPATIENT
Start: 2024-10-17 | End: 2024-10-19 | Stop reason: HOSPADM

## 2024-10-17 RX ORDER — ALBUTEROL SULFATE 90 UG/1
2 INHALANT RESPIRATORY (INHALATION) EVERY 6 HOURS PRN
Status: DISCONTINUED | OUTPATIENT
Start: 2024-10-17 | End: 2024-10-17 | Stop reason: CLARIF

## 2024-10-17 RX ADMIN — SODIUM CHLORIDE, POTASSIUM CHLORIDE, SODIUM LACTATE AND CALCIUM CHLORIDE: 600; 310; 30; 20 INJECTION, SOLUTION INTRAVENOUS at 15:22

## 2024-10-17 RX ADMIN — BACLOFEN 5 MG: 10 TABLET ORAL at 19:52

## 2024-10-17 RX ADMIN — PETROLATUM: 420 OINTMENT TOPICAL at 15:49

## 2024-10-17 RX ADMIN — CLOPIDOGREL BISULFATE 75 MG: 75 TABLET ORAL at 15:39

## 2024-10-17 RX ADMIN — ENOXAPARIN SODIUM 30 MG: 100 INJECTION SUBCUTANEOUS at 19:58

## 2024-10-17 RX ADMIN — ACETAMINOPHEN 650 MG: 325 TABLET ORAL at 15:47

## 2024-10-17 RX ADMIN — BENZONATATE 200 MG: 100 CAPSULE ORAL at 23:57

## 2024-10-17 RX ADMIN — MORPHINE SULFATE 15 MG: 15 TABLET, FILM COATED, EXTENDED RELEASE ORAL at 15:39

## 2024-10-17 RX ADMIN — SODIUM CHLORIDE: 9 INJECTION, SOLUTION INTRAVENOUS at 12:49

## 2024-10-17 RX ADMIN — TRAMADOL HYDROCHLORIDE 50 MG: 50 TABLET ORAL at 19:53

## 2024-10-17 RX ADMIN — FOLIC ACID 1 MG: 1 TABLET ORAL at 15:39

## 2024-10-17 RX ADMIN — BENZONATATE 200 MG: 100 CAPSULE ORAL at 15:39

## 2024-10-17 RX ADMIN — POTASSIUM BICARBONATE 20 MEQ: 782 TABLET, EFFERVESCENT ORAL at 15:40

## 2024-10-17 RX ADMIN — EZETIMIBE 10 MG: 10 TABLET ORAL at 19:52

## 2024-10-17 RX ADMIN — BACLOFEN 5 MG: 10 TABLET ORAL at 15:38

## 2024-10-17 RX ADMIN — SODIUM CHLORIDE 500 ML: 9 INJECTION, SOLUTION INTRAVENOUS at 11:23

## 2024-10-17 ASSESSMENT — PAIN SCALES - GENERAL
PAINLEVEL_OUTOF10: 10
PAINLEVEL_OUTOF10: 0
PAINLEVEL_OUTOF10: 8

## 2024-10-17 ASSESSMENT — PAIN DESCRIPTION - DESCRIPTORS
DESCRIPTORS: SHARP
DESCRIPTORS: ACHING;SORE

## 2024-10-17 ASSESSMENT — PAIN DESCRIPTION - LOCATION
LOCATION: BACK
LOCATION: BACK;LEG;KNEE

## 2024-10-17 ASSESSMENT — PAIN - FUNCTIONAL ASSESSMENT: PAIN_FUNCTIONAL_ASSESSMENT: 0-10

## 2024-10-17 ASSESSMENT — PAIN DESCRIPTION - ORIENTATION: ORIENTATION: RIGHT;LEFT

## 2024-10-17 NOTE — PROGRESS NOTES
4 Eyes Skin Assessment     NAME:  Jacquelin Arreguin  YOB: 1965  MEDICAL RECORD NUMBER:  [id number]    The patient is being assessed for  Admission    I agree that at least one RN has performed a thorough Head to Toe Skin Assessment on the patient. ALL assessment sites listed below have been assessed.      Areas assessed by both nurses:    Head, Face, Ears, Shoulders, Back, Chest, Arms, Elbows, Hands, Sacrum. Buttock, Coccyx, Ischium, Legs. Feet and Heels, and Under Medical Devices         Does the Patient have a Wound? No noted wound(s)       Kalia Prevention initiated by RN: No  Wound Care Orders initiated by RN: No    Pressure Injury (Stage 3,4, Unstageable, DTI, NWPT, and Complex wounds) if present, place Wound referral order by RN under : No    New Ostomies, if present place, Ostomy referral order under : No     Nurse 1 eSignature: Electronically signed by Britney Kaur RN on 10/17/24 at 7:30 PM EDT    **SHARE this note so that the co-signing nurse can place an eSignature**    Nurse 2 eSignature: Electronically signed by Linh Dobbs RN on 10/17/24 at 7:32 PM EDT

## 2024-10-17 NOTE — CONSULTS
Consult Note      Reason for Consult: SHAREE  Requesting Physician:  Dr Otero  Date of Service: 10/17/2024   Chief Complaint: Fatigue  History Obtained From:  patient, electronic medical record       HISTORY OF PRESENT ILLNESS:      The patient is a 59 y.o. female patient with PMH of asthma, hypertension, CVA, lymphedema who presents with generalized fatigue.  She recently was diagnosed with COVID-19.  Since that time she has had decreased appetite.  She also began having worsening fatigue since her diagnosis.  She also noted having low blood pressures at home.  Initial vitals were notable for BP of 93/55 on room air with HR 91 RR 18.  Initial labs on arrival showed a potassium of 3.3, bicarb 21, BUN 84, creatinine 2.7, anion gap 20, hemoglobin 10.3.Nephrology was consulted for SHAREE.      Past Medical History:    Past Medical History:   Diagnosis Date    Asthma     Bilateral carotid artery stenosis 02/15/2024    Approximately 40% plus stenosis on the right and 30% stenosis on the left, CT scan     CAD (coronary artery disease)      had MI per patient  / follows with Dr. Quintero    CVA (cerebral vascular accident) ([facility]) 10/28/2022    Decreased dorsalis pedis pulse 02/15/2024    Environmental allergies     Fracture     right 5th metacarpal    H/O: CVA (cerebrovascular accident) 02/15/2024    Heart palpitations     Hypertension     Kidney infection 2022    Leg swelling 02/15/2024    Lymphedema of both lower extremities 02/15/2024    MI (myocardial infarction) ([facility])     Syncope and collapse        Past Surgical History:    Past Surgical History:   Procedure Laterality Date    ANKLE SURGERY Right 2013     SECTION      x2    ENDOMETRIAL ABLATION  2014    FINGER SURGERY Right 2017    right 5th metacarpal open reduction internal fixation    IR MECHANICAL ART THROMBECTOMY INTRACRANIAL  10/27/2022    IR MECHANICAL ART THROMBECTOMY INTRACRANIAL 10/27/2022 Markell Mart MD [facility]  rash.   Neurological:      General: No focal deficit present.      Mental Status: She is alert and oriented to person, place, and time.           LABS:    CBC:   Recent Labs     10/17/24  1115   WBC 8.6   HGB 10.3*        BMP:    Recent Labs     10/17/24  1115      K 3.3*   CL 94*   CO2 21*   BUN 84*   CREATININE 2.7*   GLUCOSE 86      Hepatic:   Recent Labs     10/17/24  1115   AST 95*   ALT 46*   BILITOT 0.5   ALKPHOS 93     Troponin: No results for input(s): \"TROPONINI\" in the last 72 hours.  BNP: No results for input(s): \"BNP\" in the last 72 hours.  Lipids: No results for input(s): \"CHOL\", \"HDL\" in the last 72 hours.    Invalid input(s): \"LDLCALCU\"  ABGs: No results found for: \"PHART\", \"PO2ART\", \"XKG6UAJ\"  INR: No results for input(s): \"INR\" in the last 72 hours.    ASSESSMENT:     Acute kidney injury stage II   SHAREE likely due to decreased effective circulating volume in the setting of decreased oral intake with ongoing infection while on ARB   Baseline Cr of 1 mg/dL with initial Cr of 2.7 mg/dL which was also their peak creatinine    No new labs at this time  Plan   Will get renal ultrasound, UA, urine protein creatinine ration, urine microalbumin creatine ratio, urine creatinine, urine sodium, and urine urea  Continue  mL/h    BMP/CMP and phosphrous level daily   Strict monitoring of I/Os   Avoid nephrotoxic medications including NSAID, iodinated contrast     Electrolytes  Potassium 3.3  Sodium at acceptable level  Last magnesium WNL  No changes    Mineral bone disease  Corrected Calcium WNL   Would monitor phos level at least every other day  No changes    Acid base  High anion gap metabolic acidosis likely secondary to starvation ketosis  Bicarbonate 21  Chloride level  94.  Anion gap 20   Will get lactic acid B hydroxybutyrate ethanol level and urine drug screen along with serum osmole's    Hemoglobin   Hg 10.3 g/dL  Transfuse as per primary team     Blood Pressure/ HTN   BP normotensive

## 2024-10-17 NOTE — PROGRESS NOTES
OCCUPATIONAL THERAPY INITIAL EVALUATION    [facility]   [address] [facility]        Date:10/17/2024                                                  Patient Name: Jacquelin Arreguin    MRN: [id number]    : 1965    Room: [address] [place] [address]    Evaluating OT: Dulce Sanders OTR/L #[id number]    Referring Provider:  Krish Isaac MD     Specific Provider Orders/Date:  OT Eval and Treat , 10/17/2024     Diagnosis:   1. Acute renal failure, unspecified acute renal failure type (HCC)    2. Dehydration         Surgery: None       Pertinent Medical History:      Past Medical History:   Diagnosis Date    Asthma     Bilateral carotid artery stenosis 02/15/2024    Approximately 40% plus stenosis on the right and 30% stenosis on the left, CT scan     CAD (coronary artery disease)      had MI per patient  / follows with Dr. Quintero    CVA (cerebral vascular accident) ([facility]) 10/28/2022    Decreased dorsalis pedis pulse 02/15/2024    Environmental allergies     Fracture     right 5th metacarpal    H/O: CVA (cerebrovascular accident) 02/15/2024    Heart palpitations     Hypertension     Kidney infection 2022    Leg swelling 02/15/2024    Lymphedema of both lower extremities 02/15/2024    MI (myocardial infarction) ([facility])     Syncope and collapse          Past Surgical History:   Procedure Laterality Date    ANKLE SURGERY Right 2013     SECTION      x2    ENDOMETRIAL ABLATION  2014    FINGER SURGERY Right 2017    right 5th metacarpal open reduction internal fixation    IR MECHANICAL ART THROMBECTOMY INTRACRANIAL  10/27/2022    IR MECHANICAL ART THROMBECTOMY INTRACRANIAL 10/27/2022 Markell Mart MD [facility]    LEG TENDON SURGERY      right leg 3/24/14     Precautions:  Fall Risk     Assessment of current deficits    [] Functional mobility  []ADLs  [] Strength               []Cognition    [] Functional transfers   []

## 2024-10-17 NOTE — H&P
Internal Medicine History & Physical     Name: Jacquelin Arreguin  : 1965  Chief Complaint: Dizziness and Chills  Primary Care Physician: Javi Lanier MD  Admission date: 10/17/2024  Date of service: 10/17/2024     History of Present Illness  Jacquelin is a 59 y.o. year old female.  Patient states that she was shaky and dizzy at home and fell.  States she was having chills.  States she was unable to take any of her medications this morning.  She is getting over a recent COVID infection from the end of September.  She was seen in urgent care and treated with supportive care.  She states that over that time she has had a poor appetite and has not been eating and drinking as well.  She states she recently started Wegovy to help manage her weight.  Additionally, she states that she has had some shortness of breath of the last 2 days worsening with any activity.  She denies fevers but had some chills.  Patient felt nauseated without vomiting.  She had no diarrhea or loose stools.  No other skin rashes or lesions.  She does have an abrasion on the back of her left calf from bumping into a license plate cover.    Patient is currently on morphine extended release tablets 15 mg twice daily for chronic back pain for the last month.  She also is getting tramadol to help with pain.  She presented at this time feeling weak and fatigued.  She denies any alcohol use or any other drug use at this time.      ED course:   Initial blood work and imaging studies performed. Admission recommended by ED physician. Case was discussed with ED provider. Meds in ED consisted of the following:  Medications   sodium chloride 0.9 % bolus 500 mL (0 mLs IntraVENous Stopped 10/17/24 1215)   0.9 % sodium chloride infusion ( IntraVENous New Bag 10/17/24 1249)       Past Medical History:   Diagnosis Date    Asthma     Bilateral carotid artery stenosis 02/15/2024    Approximately 40% plus stenosis on the right and 30% stenosis on the left, CT  at baseline  Continue to monitor  Continue Zetia  Hypertension  Hold ARB for now given acute kidney injury  Consider other medications including oral hydralazine  Nausea and vomiting  Encourage oral intake  Chronic back pain  Currently on morphine and tramadol  Urine drug screen and alcohol level are pending    PT/OT  Follow labs  DVT prophylaxis.  Please see orders for further management and care.   for discharge planning  Discharge plan: TBD pending clinical improvement     Electronically signed by Krish Otero MD on 10/17/2024 at 1:15 PM      I can be reached through [employer].    NOTE:  This report was transcribed using voice recognition software.  Every effort was made to ensure accuracy; however, inadvertent computerized transcription errors may be present.

## 2024-10-17 NOTE — ED PROVIDER NOTES
HPI:  10/17/24,   Time: 11:06 AM EDT         Jacquelin Arreguin is a 59 y.o. female presenting to the ED for evaluation of generalized fatigue.  Persistent cough that is occasionally productive.  Decreased appetite.  Patient was diagnosed with COVID at the end of September.  She states even a week afterward she was still testing positive.  She states she never fully recovered from this and still feels very rundown.  She also states that she was having a low blood pressure for several weeks and could not understand why.  She discovered she was taking 2 blood pressure pills instead of 1.  She has since stopped that approxi-1 week ago.    ROS:   Pertinent positives and negatives are stated within HPI, all other systems reviewed and are negative.  --------------------------------------------- PAST HISTORY ---------------------------------------------  Past Medical History:  has a past medical history of Asthma, Bilateral carotid artery stenosis, CAD (coronary artery disease), CVA (cerebral vascular accident) (HCC), Decreased dorsalis pedis pulse, Environmental allergies, Fracture, H/O: CVA (cerebrovascular accident), Heart palpitations, Hypertension, Kidney infection, Leg swelling, Lymphedema of both lower extremities, MI (myocardial infarction) (HCC), and Syncope and collapse.    Past Surgical History:  has a past surgical history that includes Ankle surgery (Right, ); Leg Tendon Surgery;  section; Endometrial ablation (); Finger surgery (Right, 2017); and IR MECHANICAL ART THROMBECTOMY INTRACRANIAL (10/27/2022).    Social History:  reports that she quit smoking about 13 years ago. Her smoking use included cigarettes. She started smoking about 19 years ago. She has a 12 pack-year smoking history. She has never used smokeless tobacco. She reports that she does not currently use alcohol after a past usage of about 42.0 standard drinks of alcohol per week. She reports that she does not use  drugs.    Family History: family history includes Asthma in her father and mother; Diabetes in her father; High Blood Pressure in her father.     The patient’s home medications have been reviewed.    Allergies: Tape [adhesive tape] and Pcn [penicillins]    -------------------------------------------------- RESULTS -------------------------------------------------  All laboratory and radiology results have been personally reviewed by myself   LABS:  Results for orders placed or performed during the hospital encounter of 10/17/24   CBC   Result Value Ref Range    WBC 8.6 4.5 - 11.5 k/uL    RBC 3.53 3.50 - 5.50 m/uL    Hemoglobin 10.3 (L) 11.5 - 15.5 g/dL    Hematocrit 31.0 (L) 34.0 - 48.0 %    MCV 87.8 80.0 - 99.9 fL    MCH 29.2 26.0 - 35.0 pg    MCHC 33.2 32.0 - 34.5 g/dL    RDW 13.7 11.5 - 15.0 %    Platelets 335 130 - 450 k/uL    MPV 8.5 7.0 - 12.0 fL   Comprehensive Metabolic Panel w/ Reflex to MG   Result Value Ref Range    Sodium 135 132 - 146 mmol/L    Potassium 3.3 (L) 3.5 - 5.0 mmol/L    Chloride 94 (L) 98 - 107 mmol/L    CO2 21 (L) 22 - 29 mmol/L    Anion Gap 20 (H) 7 - 16 mmol/L    Glucose 86 74 - 99 mg/dL    BUN 84 (H) 6 - 20 mg/dL    Creatinine 2.7 (H) 0.50 - 1.00 mg/dL    Est, Glom Filt Rate 20 (L) >60 mL/min/1.73m2    Calcium 9.3 8.6 - 10.2 mg/dL    Total Protein 6.6 6.4 - 8.3 g/dL    Albumin 4.1 3.5 - 5.2 g/dL    Total Bilirubin 0.5 0.0 - 1.2 mg/dL    Alkaline Phosphatase 93 35 - 104 U/L    ALT 46 (H) 0 - 32 U/L    AST 95 (H) 0 - 31 U/L   Magnesium   Result Value Ref Range    Magnesium 2.3 1.6 - 2.6 mg/dL   EKG 12 Lead   Result Value Ref Range    Ventricular Rate 79 BPM    Atrial Rate 79 BPM    P-R Interval 166 ms    QRS Duration 102 ms    Q-T Interval 412 ms    QTc Calculation (Bazett) 472 ms    P Axis -24 degrees    R Axis -16 degrees    T Axis -14 degrees       RADIOLOGY:  Interpreted by Radiologist.  XR CHEST PORTABLE   Final Result   No acute process.             ------------------------- NURSING

## 2024-10-17 NOTE — PROGRESS NOTES
Database initiated. Patient is alert with some confusion (Said it was 2003) She ambulates independently and is RA at baseline. She did fall at home this morning.

## 2024-10-18 LAB
ANA SER QL IA: NEGATIVE
ANION GAP SERPL CALCULATED.3IONS-SCNC: 10 MMOL/L (ref 7–16)
ANION GAP SERPL CALCULATED.3IONS-SCNC: 8 MMOL/L (ref 7–16)
BASOPHILS # BLD: 0.03 K/UL (ref 0–0.2)
BASOPHILS NFR BLD: 1 % (ref 0–2)
BUN SERPL-MCNC: 44 MG/DL (ref 6–20)
BUN SERPL-MCNC: 50 MG/DL (ref 6–20)
CALCIUM SERPL-MCNC: 8.7 MG/DL (ref 8.6–10.2)
CALCIUM SERPL-MCNC: 8.8 MG/DL (ref 8.6–10.2)
CHLORIDE SERPL-SCNC: 97 MMOL/L (ref 98–107)
CHLORIDE SERPL-SCNC: 98 MMOL/L (ref 98–107)
CO2 SERPL-SCNC: 24 MMOL/L (ref 22–29)
CO2 SERPL-SCNC: 25 MMOL/L (ref 22–29)
CREAT SERPL-MCNC: 0.9 MG/DL (ref 0.5–1)
CREAT SERPL-MCNC: 0.9 MG/DL (ref 0.5–1)
EOSINOPHIL # BLD: 0.13 K/UL (ref 0.05–0.5)
EOSINOPHILS PERCENT, URINE: 0 % (ref 0–1)
EOSINOPHILS RELATIVE PERCENT: 3 % (ref 0–6)
ERYTHROCYTE [DISTWIDTH] IN BLOOD BY AUTOMATED COUNT: 13.7 % (ref 11.5–15)
GFR, ESTIMATED: 72 ML/MIN/1.73M2
GFR, ESTIMATED: 79 ML/MIN/1.73M2
GLUCOSE SERPL-MCNC: 101 MG/DL (ref 74–99)
GLUCOSE SERPL-MCNC: 165 MG/DL (ref 74–99)
HAV IGM SERPL QL IA: NONREACTIVE
HBA1C MFR BLD: 5.9 % (ref 4–5.6)
HBV CORE IGM SERPL QL IA: NONREACTIVE
HBV SURFACE AG SERPL QL IA: NONREACTIVE
HCT VFR BLD AUTO: 26.5 % (ref 34–48)
HCV AB SERPL QL IA: NONREACTIVE
HGB BLD-MCNC: 8.7 G/DL (ref 11.5–15.5)
HIV 1+2 AB+HIV1 P24 AG SERPL QL IA: NONREACTIVE
IMM GRANULOCYTES # BLD AUTO: <0.03 K/UL (ref 0–0.58)
IMM GRANULOCYTES NFR BLD: 0 % (ref 0–5)
LACTATE BLDV-SCNC: 0.9 MMOL/L (ref 0.5–2.2)
LACTATE BLDV-SCNC: 1.2 MMOL/L (ref 0.5–1.9)
LYMPHOCYTES NFR BLD: 1.35 K/UL (ref 1.5–4)
LYMPHOCYTES RELATIVE PERCENT: 28 % (ref 20–42)
MAGNESIUM SERPL-MCNC: 1.9 MG/DL (ref 1.6–2.6)
MCH RBC QN AUTO: 28.5 PG (ref 26–35)
MCHC RBC AUTO-ENTMCNC: 32.8 G/DL (ref 32–34.5)
MCV RBC AUTO: 86.9 FL (ref 80–99.9)
MONOCYTES NFR BLD: 0.64 K/UL (ref 0.1–0.95)
MONOCYTES NFR BLD: 13 % (ref 2–12)
NEUTROPHILS NFR BLD: 55 % (ref 43–80)
NEUTS SEG NFR BLD: 2.67 K/UL (ref 1.8–7.3)
PHOSPHATE SERPL-MCNC: 2.1 MG/DL (ref 2.5–4.5)
PLATELET # BLD AUTO: 279 K/UL (ref 130–450)
PMV BLD AUTO: 8.4 FL (ref 7–12)
POTASSIUM SERPL-SCNC: 3.2 MMOL/L (ref 3.5–5)
POTASSIUM SERPL-SCNC: 3.5 MMOL/L (ref 3.5–5)
RBC # BLD AUTO: 3.05 M/UL (ref 3.5–5.5)
SODIUM SERPL-SCNC: 131 MMOL/L (ref 132–146)
SODIUM SERPL-SCNC: 131 MMOL/L (ref 132–146)
WBC OTHER # BLD: 4.8 K/UL (ref 4.5–11.5)

## 2024-10-18 PROCEDURE — 6370000000 HC RX 637 (ALT 250 FOR IP): Performed by: NURSE PRACTITIONER

## 2024-10-18 PROCEDURE — 83735 ASSAY OF MAGNESIUM: CPT

## 2024-10-18 PROCEDURE — 83605 ASSAY OF LACTIC ACID: CPT

## 2024-10-18 PROCEDURE — 2580000003 HC RX 258: Performed by: HOSPITALIST

## 2024-10-18 PROCEDURE — 6370000000 HC RX 637 (ALT 250 FOR IP): Performed by: INTERNAL MEDICINE

## 2024-10-18 PROCEDURE — 85025 COMPLETE CBC W/AUTO DIFF WBC: CPT

## 2024-10-18 PROCEDURE — 36415 COLL VENOUS BLD VENIPUNCTURE: CPT

## 2024-10-18 PROCEDURE — 84100 ASSAY OF PHOSPHORUS: CPT

## 2024-10-18 PROCEDURE — 83036 HEMOGLOBIN GLYCOSYLATED A1C: CPT

## 2024-10-18 PROCEDURE — 6370000000 HC RX 637 (ALT 250 FOR IP): Performed by: HOSPITALIST

## 2024-10-18 PROCEDURE — 1200000000 HC SEMI PRIVATE

## 2024-10-18 PROCEDURE — 80048 BASIC METABOLIC PNL TOTAL CA: CPT

## 2024-10-18 PROCEDURE — 6360000002 HC RX W HCPCS: Performed by: HOSPITALIST

## 2024-10-18 RX ORDER — GUAIFENESIN 400 MG/1
400 TABLET ORAL 4 TIMES DAILY PRN
Status: DISCONTINUED | OUTPATIENT
Start: 2024-10-18 | End: 2024-10-19

## 2024-10-18 RX ADMIN — GUAIFENESIN 400 MG: 400 TABLET ORAL at 15:04

## 2024-10-18 RX ADMIN — PSYLLIUM HUSK 1 PACKET: 3.4 GRANULE ORAL at 08:22

## 2024-10-18 RX ADMIN — MORPHINE SULFATE 15 MG: 15 TABLET, FILM COATED, EXTENDED RELEASE ORAL at 03:06

## 2024-10-18 RX ADMIN — ACETAMINOPHEN 650 MG: 325 TABLET ORAL at 18:15

## 2024-10-18 RX ADMIN — BACLOFEN 5 MG: 10 TABLET ORAL at 15:00

## 2024-10-18 RX ADMIN — MORPHINE SULFATE 15 MG: 15 TABLET, FILM COATED, EXTENDED RELEASE ORAL at 15:00

## 2024-10-18 RX ADMIN — SODIUM CHLORIDE, PRESERVATIVE FREE 10 ML: 5 INJECTION INTRAVENOUS at 08:24

## 2024-10-18 RX ADMIN — ENOXAPARIN SODIUM 30 MG: 100 INJECTION SUBCUTANEOUS at 19:54

## 2024-10-18 RX ADMIN — EZETIMIBE 10 MG: 10 TABLET ORAL at 19:53

## 2024-10-18 RX ADMIN — BACLOFEN 5 MG: 10 TABLET ORAL at 19:53

## 2024-10-18 RX ADMIN — FOLIC ACID 1 MG: 1 TABLET ORAL at 08:23

## 2024-10-18 RX ADMIN — TRAMADOL HYDROCHLORIDE 50 MG: 50 TABLET ORAL at 10:38

## 2024-10-18 RX ADMIN — ATORVASTATIN CALCIUM 80 MG: 40 TABLET, FILM COATED ORAL at 19:54

## 2024-10-18 RX ADMIN — BACLOFEN 5 MG: 10 TABLET ORAL at 08:22

## 2024-10-18 RX ADMIN — PANTOPRAZOLE SODIUM 40 MG: 40 TABLET, DELAYED RELEASE ORAL at 06:06

## 2024-10-18 RX ADMIN — POTASSIUM & SODIUM PHOSPHATES POWDER PACK 280-160-250 MG 500 MG: 280-160-250 PACK at 09:59

## 2024-10-18 RX ADMIN — SODIUM CHLORIDE, POTASSIUM CHLORIDE, SODIUM LACTATE AND CALCIUM CHLORIDE: 600; 310; 30; 20 INJECTION, SOLUTION INTRAVENOUS at 07:45

## 2024-10-18 RX ADMIN — CLOPIDOGREL BISULFATE 75 MG: 75 TABLET ORAL at 08:24

## 2024-10-18 RX ADMIN — BENZONATATE 200 MG: 100 CAPSULE ORAL at 08:23

## 2024-10-18 RX ADMIN — BENZONATATE 200 MG: 100 CAPSULE ORAL at 15:04

## 2024-10-18 RX ADMIN — TRAMADOL HYDROCHLORIDE 50 MG: 50 TABLET ORAL at 17:27

## 2024-10-18 RX ADMIN — ACETAMINOPHEN 650 MG: 325 TABLET ORAL at 06:10

## 2024-10-18 RX ADMIN — TRAMADOL HYDROCHLORIDE 50 MG: 50 TABLET ORAL at 04:20

## 2024-10-18 ASSESSMENT — PAIN DESCRIPTION - LOCATION
LOCATION: BACK
LOCATION: BACK;KNEE

## 2024-10-18 ASSESSMENT — PAIN SCALES - GENERAL
PAINLEVEL_OUTOF10: 8

## 2024-10-18 ASSESSMENT — PAIN DESCRIPTION - DESCRIPTORS: DESCRIPTORS: SORE;SHARP

## 2024-10-18 ASSESSMENT — PAIN DESCRIPTION - ORIENTATION: ORIENTATION: RIGHT;LEFT

## 2024-10-18 ASSESSMENT — PAIN - FUNCTIONAL ASSESSMENT: PAIN_FUNCTIONAL_ASSESSMENT: PREVENTS OR INTERFERES SOME ACTIVE ACTIVITIES AND ADLS

## 2024-10-18 NOTE — CARE COORDINATION
Chart Reviewed. NO CM/SW needs noted. Patient is from home independently. Patient is established with a PCP and has insurance. Patient admitted with SHAREE. Renal following, fluids, renal ultrasound ordered. Anticipate no discharge needs.   Electronically signed by Kendy Huerta RN on 10/18/2024 at 1:17 PM

## 2024-10-18 NOTE — PATIENT CARE CONFERENCE
P Quality Flow/Interdisciplinary Rounds Progress Note        Quality Flow Rounds held on October 18, 2024    Disciplines Attending:  , , and Nursing Unit Leadership    Jacquelin Arreguin was admitted on 10/17/2024 10:16 AM    Anticipated Discharge Date:       Disposition:    Kalia Score:  Kalia Scale Score: 22    Readmission Risk              Risk of Unplanned Readmission:  21           Discussed patient goal for the day, patient clinical progression, and barriers to discharge.  The following Goal(s) of the Day/Commitment(s) have been identified:  Labs - Report Results      Kerri Taylor RN  October 18, 2024

## 2024-10-18 NOTE — PROGRESS NOTES
Pt concerned about her IV. Pt states it hurts. IV site was assessed, no signs or indications of any issues. I offered to remove the IV site and place a new one. Pt stated \" Well I would rather have IV team do it\". I told the pt that IV team is only here during the day and we call them after multiple unsuccessful attempts by the RN. Pt insisted current IV be kept in place until \"It hurts really bad and IV team can get here because they are professionals with machines\". Pt refused to allow me to insert a new one.     Electronically signed by Nora Gonzales RN on 10/18/2024 at 4:58 AM

## 2024-10-18 NOTE — PROGRESS NOTES
Nephrology Attending   Inpatient Progress Note    Admit Date: 10/17/2024                                  PCP: Javi Lanier MD    History of presenting illness: As per initial consult   The patient is a 59 y.o. female patient with PMH of asthma, hypertension, CVA, lymphedema who presents with generalized fatigue.  She recently was diagnosed with COVID-19.  Since that time she has had decreased appetite.  She also began having worsening fatigue since her diagnosis.  She also noted having low blood pressures at home.  Initial vitals were notable for BP of 93/55 on room air with HR 91 RR 18.  Initial labs on arrival showed a potassium of 3.3, bicarb 21, BUN 84, creatinine 2.7, anion gap 20, hemoglobin 10.3.Nephrology was consulted for SHAREE.      10/18/2024 renal improved with IV fluids    Clinical course:    10/18/2024: No new complaints.  No issues overnight.  Renal function improving.    Vitals:  VITALS:  BP (!) 141/65   Pulse 64   Temp 97.6 °F (36.4 °C) (Oral)   Resp 17   Ht 1.651 m (5' 5\")   Wt 87.5 kg (192 lb 14.4 oz)   SpO2 98%   BMI 32.10 kg/m²   24HR INTAKE/OUTPUT:    Intake/Output Summary (Last 24 hours) at 10/18/2024 1248  Last data filed at 10/18/2024 1005  Gross per 24 hour   Intake --   Output 2000 ml   Net -2000 ml     CURRENT PULSE OXIMETRY:  SpO2: 98 %  24HR PULSE OXIMETRY RANGE:  SpO2  Av.7 %  Min: 95 %  Max: 98 %        I/O:      I/O last 3 completed shifts:  In: -   Out: 1300 [Urine:1300]  I/O this shift:  In: -   Out: 700 [Urine:700]    Medications:    IV:   sodium chloride      lactated ringers IV soln 125 mL/hr at 10/18/24 0745      potassium & sodium phosphates  2 packet Oral BID    folic acid  1 mg Oral Daily    clopidogrel  75 mg Oral Daily    aspirin  325 mg Oral Daily    [Held by provider] valsartan  160 mg Oral Daily    baclofen  5 mg Oral TID    pantoprazole  40 mg Oral QAM AC    [Held by provider] atorvastatin  80 mg Oral Nightly    [Held by provider] bumetanide  1 mg Oral

## 2024-10-18 NOTE — PROGRESS NOTES
Paged Dr. Otero, \"pt wants to know why valsartan is being held. This RN attempted to explain to the patient but she refused edcation.  Pt is upset becuase she is not getting it. she states that they\" cant just take her off of it cold turkey. If thy are not going to give me that one, they need to replace it with something else\". She states she \"can feel her heart beating out of her chest\". Pt also states that she does not take the Aspirin and wants it removed from her list of medications.\"    Stated that he will address on rounds.

## 2024-10-18 NOTE — PROGRESS NOTES
Patient concerned about her daily fiber supplement \"psyllium husk\"she states she takes 2 capsules twice a day. States if she does not take it she will not have a bowel movement. Notified Airam CARBONE see new order.     Electronically signed by Linh Dobbs RN on 10/17/2024 at 10:24 PM

## 2024-10-18 NOTE — PROGRESS NOTES
Patient refusing lactic redraw (per order every 2 hours for 2 occurences) She stated \"they took 8 tubes out of me earlier they are not getting anymore\". Education provided to patient about importance of seeing if her labs have improved with the fluids we have going to rehydrate her. Patient refused lactic lab right now but agreed for morning labs to be drawn for 6am labs.    Electronically signed by Linh Dobbs RN on 10/17/2024 at 2005

## 2024-10-18 NOTE — PROGRESS NOTES
Internal Medicine Progress Note    Patient's name: Jacquelin Arreguin  : 1965  Chief complaints (on day of admission): Dizziness and Chills  Admission date: 10/17/2024  Date of service: 10/18/2024   Room: [address] MED SURG  Primary care physician: Javi Lanier MD  Reason for visit: Follow-up for acute kidney injury    Subjective  Jacquelin was seen and examined.  She is sitting in bed.  Had several questions regarding her medications.  States she is starting to eat and drink better.  She is still coughing and wants a different cough medicine.  She denies fevers or chills overnight.  Patient's IV was not working half the night.  She is requesting have a team replace it.  She is also requesting her valsartan.  She was taking extra diuretics at home as well.  She was not eating or drinking well prior to coming in with the COVID.      Review of Systems  There are no new complaints of chest pain, shortness of breath, abdominal pain, nausea, vomiting, diarrhea, constipation.    Hospital Medications  Current Facility-Administered Medications   Medication Dose Route Frequency Provider Last Rate Last Admin    potassium & sodium phosphates (PHOS-NAK) 280-160-250 MG packet 500 mg  2 packet Oral BID Grey Siu MD   500 mg at 10/18/24 0959    folic acid (FOLVITE) tablet 1 mg  1 mg Oral Daily Krish Otero MD   1 mg at 10/18/24 0823    clopidogrel (PLAVIX) tablet 75 mg  75 mg Oral Daily Krish Otero MD   75 mg at 10/18/24 0824    aspirin tablet 325 mg  325 mg Oral Daily Krish Otero MD        [Held by provider] valsartan (DIOVAN) tablet 160 mg  160 mg Oral Daily Krish Otero MD        baclofen (LIORESAL) tablet 5 mg  5 mg Oral TID Krish Otero MD   5 mg at 10/18/24 0822    pantoprazole (PROTONIX) tablet 40 mg  40 mg Oral QAM AC Krish Otero MD   40 mg at 10/18/24 0606    [Held by provider] atorvastatin (LIPITOR) tablet 80 mg  80 mg Oral Nightly Krish Otero MD        [Held by provider] bumetanide (BUMEX) tablet  1 mg  1 mg Oral Daily Krish Otero MD        [Held by provider] hydroCHLOROthiazide (HYDRODIURIL) tablet 25 mg  25 mg Oral QAM Krish Otero MD        [Held by provider] thiamine tablet 100 mg  100 mg Oral Daily Krish Otero MD        sodium chloride flush 0.9 % injection 5-40 mL  5-40 mL IntraVENous 2 times per day Krish Otero MD   10 mL at 10/18/24 0824    sodium chloride flush 0.9 % injection 5-40 mL  5-40 mL IntraVENous PRN Krish Otero MD        0.9 % sodium chloride infusion   IntraVENous PRN Krish Otero MD        enoxaparin Sodium (LOVENOX) injection 30 mg  30 mg SubCUTAneous Daily Krish Otero MD   30 mg at 10/17/24 1958    ondansetron (ZOFRAN-ODT) disintegrating tablet 4 mg  4 mg Oral Q8H PRN Krish Otero MD        Or    ondansetron (ZOFRAN) injection 4 mg  4 mg IntraVENous Q6H PRN Krish Otero MD        polyethylene glycol (GLYCOLAX) packet 17 g  17 g Oral Daily PRN Krish Otero MD        acetaminophen (TYLENOL) tablet 650 mg  650 mg Oral Q6H PRN Krish Otero MD   650 mg at 10/18/24 0610    Or    acetaminophen (TYLENOL) suppository 650 mg  650 mg Rectal Q6H PRN Krish Otero MD        lactated ringers IV soln infusion   IntraVENous Continuous Krish Otero  mL/hr at 10/18/24 0745 New Bag at 10/18/24 0745    albuterol (PROVENTIL) (2.5 MG/3ML) 0.083% nebulizer solution 2.5 mg  2.5 mg Nebulization Q6H PRN Krish Otero MD        fluticasone (FLONASE) 50 MCG/ACT nasal spray 1 spray  1 spray Each Nostril Nightly PRN Krish Otero MD        benzonatate (TESSALON) capsule 200 mg  200 mg Oral Q8H PRN Krish Otero MD   200 mg at 10/18/24 0823    ezetimibe (ZETIA) tablet 10 mg  10 mg Oral Nightly Krish Otero MD   10 mg at 10/17/24 1952    morphine (MS CONTIN) extended release tablet 15 mg  15 mg Oral Q12H Krish Otero MD   15 mg at 10/18/24 0306    traMADol (ULTRAM) tablet 50 mg  50 mg Oral Q6H PRN Krish Otero MD   50 mg at 10/18/24 1038    white petrolatum ointment   Topical BID PRN Branden,

## 2024-10-18 NOTE — PLAN OF CARE
Problem: ABCDS Injury Assessment  Goal: Absence of physical injury  10/18/2024 1207 by Hallie Sanchez RN  Outcome: Progressing  10/17/2024 2228 by Linh Dobbs RN  Outcome: Progressing     Problem: Discharge Planning  Goal: Discharge to home or other facility with appropriate resources  10/18/2024 1207 by Hallie Sanchez RN  Outcome: Progressing  10/17/2024 2228 by Linh Dobbs RN  Outcome: Progressing     Problem: Chronic Conditions and Co-morbidities  Goal: Patient's chronic conditions and co-morbidity symptoms are monitored and maintained or improved  10/18/2024 1207 by Hallie Sanchez RN  Outcome: Progressing  10/17/2024 2228 by Linh Dobbs RN  Outcome: Progressing     Problem: Pain  Goal: Verbalizes/displays adequate comfort level or baseline comfort level  10/18/2024 1207 by Hallie Sanchez RN  Outcome: Progressing  10/17/2024 2228 by Linh Dobbs RN  Outcome: Progressing

## 2024-10-18 NOTE — PROGRESS NOTES
Paged Dr. Otero via perfect serve, \"pt complaining of constant dry cough, any orders? I gave teskaren ragsdale and herb per MAR, pt states that it did not help\"

## 2024-10-19 VITALS
TEMPERATURE: 97.2 F | BODY MASS INDEX: 32.15 KG/M2 | HEART RATE: 67 BPM | SYSTOLIC BLOOD PRESSURE: 156 MMHG | OXYGEN SATURATION: 100 % | WEIGHT: 193 LBS | HEIGHT: 65 IN | RESPIRATION RATE: 18 BRPM | DIASTOLIC BLOOD PRESSURE: 78 MMHG

## 2024-10-19 LAB
ALBUMIN SERPL-MCNC: 3.3 G/DL (ref 3.5–4.7)
ALPHA1 GLOB SERPL ELPH-MCNC: 0.4 G/DL (ref 0.2–0.4)
ALPHA2 GLOB SERPL ELPH-MCNC: 0.9 G/DL (ref 0.5–1)
ANION GAP SERPL CALCULATED.3IONS-SCNC: 9 MMOL/L (ref 7–16)
B-GLOBULIN SERPL ELPH-MCNC: 0.9 G/DL (ref 0.8–1.3)
BASOPHILS # BLD: 0.03 K/UL (ref 0–0.2)
BASOPHILS NFR BLD: 1 % (ref 0–2)
BUN SERPL-MCNC: 28 MG/DL (ref 6–20)
CALCIUM SERPL-MCNC: 9 MG/DL (ref 8.6–10.2)
CHLORIDE SERPL-SCNC: 101 MMOL/L (ref 98–107)
CO2 SERPL-SCNC: 26 MMOL/L (ref 22–29)
CREAT SERPL-MCNC: 0.7 MG/DL (ref 0.5–1)
EOSINOPHIL # BLD: 0.09 K/UL (ref 0.05–0.5)
EOSINOPHILS RELATIVE PERCENT: 2 % (ref 0–6)
ERYTHROCYTE [DISTWIDTH] IN BLOOD BY AUTOMATED COUNT: 13.8 % (ref 11.5–15)
GAMMA GLOB SERPL ELPH-MCNC: 0.6 G/DL (ref 0.7–1.6)
GFR, ESTIMATED: >90 ML/MIN/1.73M2
GLUCOSE SERPL-MCNC: 97 MG/DL (ref 74–99)
HCT VFR BLD AUTO: 27 % (ref 34–48)
HGB BLD-MCNC: 9.1 G/DL (ref 11.5–15.5)
IMM GRANULOCYTES # BLD AUTO: <0.03 K/UL (ref 0–0.58)
IMM GRANULOCYTES NFR BLD: 0 % (ref 0–5)
LYMPHOCYTES NFR BLD: 1.4 K/UL (ref 1.5–4)
LYMPHOCYTES RELATIVE PERCENT: 28 % (ref 20–42)
MCH RBC QN AUTO: 29.4 PG (ref 26–35)
MCHC RBC AUTO-ENTMCNC: 33.7 G/DL (ref 32–34.5)
MCV RBC AUTO: 87.1 FL (ref 80–99.9)
MONOCYTES NFR BLD: 0.6 K/UL (ref 0.1–0.95)
MONOCYTES NFR BLD: 12 % (ref 2–12)
NEUTROPHILS NFR BLD: 57 % (ref 43–80)
NEUTS SEG NFR BLD: 2.83 K/UL (ref 1.8–7.3)
PATHOLOGIST: ABNORMAL
PLATELET # BLD AUTO: 309 K/UL (ref 130–450)
PMV BLD AUTO: 8.7 FL (ref 7–12)
POTASSIUM SERPL-SCNC: 3.7 MMOL/L (ref 3.5–5)
PROT PATTERN SERPL ELPH-IMP: ABNORMAL
PROT SERPL-MCNC: 6.1 G/DL (ref 6.4–8.3)
RBC # BLD AUTO: 3.1 M/UL (ref 3.5–5.5)
SODIUM SERPL-SCNC: 136 MMOL/L (ref 132–146)
WBC OTHER # BLD: 5 K/UL (ref 4.5–11.5)

## 2024-10-19 PROCEDURE — 2500000003 HC RX 250 WO HCPCS: Performed by: NURSE PRACTITIONER

## 2024-10-19 PROCEDURE — 6370000000 HC RX 637 (ALT 250 FOR IP): Performed by: INTERNAL MEDICINE

## 2024-10-19 PROCEDURE — 6360000002 HC RX W HCPCS: Performed by: HOSPITALIST

## 2024-10-19 PROCEDURE — 6370000000 HC RX 637 (ALT 250 FOR IP): Performed by: HOSPITALIST

## 2024-10-19 PROCEDURE — 80048 BASIC METABOLIC PNL TOTAL CA: CPT

## 2024-10-19 PROCEDURE — 6370000000 HC RX 637 (ALT 250 FOR IP): Performed by: NURSE PRACTITIONER

## 2024-10-19 PROCEDURE — 85025 COMPLETE CBC W/AUTO DIFF WBC: CPT

## 2024-10-19 PROCEDURE — 2580000003 HC RX 258: Performed by: HOSPITALIST

## 2024-10-19 RX ORDER — GUAIFENESIN 200 MG/10ML
200 LIQUID ORAL EVERY 4 HOURS PRN
Status: DISCONTINUED | OUTPATIENT
Start: 2024-10-19 | End: 2024-10-19 | Stop reason: HOSPADM

## 2024-10-19 RX ORDER — ENOXAPARIN SODIUM 100 MG/ML
40 INJECTION SUBCUTANEOUS DAILY
Status: DISCONTINUED | OUTPATIENT
Start: 2024-10-19 | End: 2024-10-19 | Stop reason: HOSPADM

## 2024-10-19 RX ORDER — HYDRALAZINE HYDROCHLORIDE 25 MG/1
25 TABLET, FILM COATED ORAL EVERY 8 HOURS SCHEDULED
Qty: 90 TABLET | Refills: 0 | Status: SHIPPED | OUTPATIENT
Start: 2024-10-19

## 2024-10-19 RX ORDER — AMLODIPINE BESYLATE 5 MG/1
5 TABLET ORAL DAILY
Status: DISCONTINUED | OUTPATIENT
Start: 2024-10-19 | End: 2024-10-19 | Stop reason: HOSPADM

## 2024-10-19 RX ORDER — AMLODIPINE BESYLATE 5 MG/1
5 TABLET ORAL DAILY
Qty: 30 TABLET | Refills: 3 | Status: SHIPPED | OUTPATIENT
Start: 2024-10-20

## 2024-10-19 RX ORDER — HYDRALAZINE HYDROCHLORIDE 25 MG/1
25 TABLET, FILM COATED ORAL EVERY 8 HOURS SCHEDULED
Status: DISCONTINUED | OUTPATIENT
Start: 2024-10-19 | End: 2024-10-19 | Stop reason: HOSPADM

## 2024-10-19 RX ADMIN — HYDRALAZINE HYDROCHLORIDE 25 MG: 25 TABLET ORAL at 05:58

## 2024-10-19 RX ADMIN — HYDRALAZINE HYDROCHLORIDE 25 MG: 25 TABLET ORAL at 14:09

## 2024-10-19 RX ADMIN — ACETAMINOPHEN 650 MG: 325 TABLET ORAL at 02:01

## 2024-10-19 RX ADMIN — Medication 100 MG: at 08:32

## 2024-10-19 RX ADMIN — CLOPIDOGREL BISULFATE 75 MG: 75 TABLET ORAL at 08:33

## 2024-10-19 RX ADMIN — GUAIFENESIN 200 MG: 200 SOLUTION ORAL at 06:02

## 2024-10-19 RX ADMIN — BACLOFEN 5 MG: 10 TABLET ORAL at 08:33

## 2024-10-19 RX ADMIN — TRAMADOL HYDROCHLORIDE 50 MG: 50 TABLET ORAL at 14:09

## 2024-10-19 RX ADMIN — MORPHINE SULFATE 15 MG: 15 TABLET, FILM COATED, EXTENDED RELEASE ORAL at 14:10

## 2024-10-19 RX ADMIN — ACETAMINOPHEN 650 MG: 325 TABLET ORAL at 13:03

## 2024-10-19 RX ADMIN — FOLIC ACID 1 MG: 1 TABLET ORAL at 08:32

## 2024-10-19 RX ADMIN — SODIUM CHLORIDE, PRESERVATIVE FREE 10 ML: 5 INJECTION INTRAVENOUS at 08:33

## 2024-10-19 RX ADMIN — MORPHINE SULFATE 15 MG: 15 TABLET, FILM COATED, EXTENDED RELEASE ORAL at 02:33

## 2024-10-19 RX ADMIN — AMLODIPINE BESYLATE 5 MG: 5 TABLET ORAL at 10:16

## 2024-10-19 RX ADMIN — PANTOPRAZOLE SODIUM 40 MG: 40 TABLET, DELAYED RELEASE ORAL at 05:58

## 2024-10-19 RX ADMIN — BACLOFEN 5 MG: 10 TABLET ORAL at 14:09

## 2024-10-19 RX ADMIN — ENOXAPARIN SODIUM 40 MG: 100 INJECTION SUBCUTANEOUS at 06:50

## 2024-10-19 ASSESSMENT — PAIN DESCRIPTION - ORIENTATION
ORIENTATION: UPPER;MID;LOWER;RIGHT;LEFT
ORIENTATION: RIGHT;MID;LOWER
ORIENTATION: RIGHT;LOWER
ORIENTATION: RIGHT;LEFT

## 2024-10-19 ASSESSMENT — PAIN - FUNCTIONAL ASSESSMENT
PAIN_FUNCTIONAL_ASSESSMENT: PREVENTS OR INTERFERES WITH MANY ACTIVE NOT PASSIVE ACTIVITIES
PAIN_FUNCTIONAL_ASSESSMENT: PREVENTS OR INTERFERES SOME ACTIVE ACTIVITIES AND ADLS
PAIN_FUNCTIONAL_ASSESSMENT: PREVENTS OR INTERFERES SOME ACTIVE ACTIVITIES AND ADLS

## 2024-10-19 ASSESSMENT — PAIN SCALES - GENERAL
PAINLEVEL_OUTOF10: 8
PAINLEVEL_OUTOF10: 3
PAINLEVEL_OUTOF10: 8
PAINLEVEL_OUTOF10: 10

## 2024-10-19 ASSESSMENT — PAIN DESCRIPTION - LOCATION
LOCATION: HEAD
LOCATION: BACK
LOCATION: BACK
LOCATION: BACK;KNEE
LOCATION: BACK

## 2024-10-19 ASSESSMENT — PAIN DESCRIPTION - DESCRIPTORS
DESCRIPTORS: ACHING;THROBBING
DESCRIPTORS: ACHING
DESCRIPTORS: ACHING;THROBBING
DESCRIPTORS: ACHING
DESCRIPTORS: ACHING

## 2024-10-19 ASSESSMENT — PAIN SCALES - WONG BAKER: WONGBAKER_NUMERICALRESPONSE: HURTS EVEN MORE

## 2024-10-19 NOTE — PROGRESS NOTES
Physical Therapy  Facility/Department: [address] MED SURG      Name: Jacquelin Arreguin  : 1965  MRN: [id number]  Date of Service: 10/19/2024    Order received for PT evaluation, pt up independently in room, denies any PT needs at this time.  Rosio Malagon PT [id number]

## 2024-10-19 NOTE — PROGRESS NOTES
DVT Prophylaxis Adjustment Policy (DVT Prophylaxis)     This patient is on DVT Prophylaxis medication that requires a dose adjustment      Date Body Weight IBW  Adjusted BW SCr  CrCl Dialysis status   10/19/2024 87.5 kg (193 lb) Ideal body weight: 57 kg (125 lb 10.6 oz)  Adjusted ideal body weight: 69.2 kg (152 lb 9.6 oz) Serum creatinine: 0.9 mg/dL 10/18/24 1315  Estimated creatinine clearance: 74 mL/min N/a       Pharmacy has dose-adjusted the DVT Prophylaxis regimen to match   the recommendations from the following table        Ordered Medication:Lovenox 30mg daily    Order Changed/converted to: Lovenox 40mg daily      These changes were made per protocol according to the [facility] Pharmacist   Review for Appropriate Use and Automatic Dose Adjustments of   Subcutaneous Anticoagulants Policy     *Please note this dose may need readjusted if patient's condition changes.    Please contact pharmacy with any questions regarding these changes.    dario mead RPH  10/19/2024  6:27 AM

## 2024-10-19 NOTE — PLAN OF CARE
Problem: ABCDS Injury Assessment  Goal: Absence of physical injury  10/19/2024 1019 by Dee Pennington RN  Outcome: Progressing     Problem: Discharge Planning  Goal: Discharge to home or other facility with appropriate resources  10/19/2024 1019 by Dee Pennington RN  Outcome: Progressing     Problem: Chronic Conditions and Co-morbidities  Goal: Patient's chronic conditions and co-morbidity symptoms are monitored and maintained or improved  10/19/2024 1019 by Dee Pennington RN  Outcome: Progressing     Problem: Pain  Goal: Verbalizes/displays adequate comfort level or baseline comfort level  10/19/2024 1019 by Dee Pennington RN  Outcome: Progressing     Problem: Safety - Adult  Goal: Free from fall injury  10/19/2024 1019 by Dee Pennington RN  Outcome: Progressing

## 2024-10-19 NOTE — PROGRESS NOTES
MD        ondansetron (ZOFRAN-ODT) disintegrating tablet 4 mg  4 mg Oral Q8H PRN Krish Otero MD        Or    ondansetron (ZOFRAN) injection 4 mg  4 mg IntraVENous Q6H PRN Krish Otero MD        polyethylene glycol (GLYCOLAX) packet 17 g  17 g Oral Daily PRN Krish Otero MD        acetaminophen (TYLENOL) tablet 650 mg  650 mg Oral Q6H PRN Krish Otero MD   650 mg at 10/19/24 0201    Or    acetaminophen (TYLENOL) suppository 650 mg  650 mg Rectal Q6H PRN Krish Otero MD        albuterol (PROVENTIL) (2.5 MG/3ML) 0.083% nebulizer solution 2.5 mg  2.5 mg Nebulization Q6H PRN Krish Otero MD        fluticasone (FLONASE) 50 MCG/ACT nasal spray 1 spray  1 spray Each Nostril Nightly Krish Luciano MD        benzonatate (TESSALON) capsule 200 mg  200 mg Oral Q8H PRN Krish Otero MD   200 mg at 10/18/24 1504    ezetimibe (ZETIA) tablet 10 mg  10 mg Oral Nightly Krish Otero MD   10 mg at 10/18/24 1953    morphine (MS CONTIN) extended release tablet 15 mg  15 mg Oral Q12H Krish Otero MD   15 mg at 10/19/24 0233    traMADol (ULTRAM) tablet 50 mg  50 mg Oral Q6H PRN Krish Otero MD   50 mg at 10/18/24 1727    white petrolatum ointment   Topical BID PRN Krish Otero MD   Given at 10/17/24 1549    psyllium (KONSYL) 28.3 % packet 1 packet  1 packet Oral BID Airam Elias APRN - CNP   1 packet at 10/18/24 0822       Diet:  ADULT DIET; Regular     EXAM:  Physical Exam  Constitutional:       Appearance: She is not ill-appearing, toxic-appearing or diaphoretic.   HENT:      Head: Normocephalic.      Right Ear: External ear normal.      Left Ear: External ear normal.      Nose: Nose normal.      Mouth/Throat:      Mouth: Mucous membranes are moist.      Pharynx: No oropharyngeal exudate or posterior oropharyngeal erythema.   Eyes:      Extraocular Movements: Extraocular movements intact.   Cardiovascular:      Rate and Rhythm: Normal rate and regular rhythm.      Pulses: Normal pulses.      Heart sounds: Normal heart  sounds. No murmur heard.     No friction rub. No gallop.   Pulmonary:      Effort: Pulmonary effort is normal. No respiratory distress.      Breath sounds: Normal breath sounds. No stridor. No wheezing, rhonchi or rales.   Abdominal:      General: Abdomen is flat. There is no distension.      Palpations: Abdomen is soft. There is no mass.      Tenderness: There is no abdominal tenderness. There is no guarding or rebound.   Musculoskeletal:         General: No swelling, deformity or signs of injury.      Right lower leg: No edema.      Left lower leg: No edema.   Skin:     Coloration: Skin is not jaundiced or pale.      Findings: No erythema or rash.   Neurological:      General: No focal deficit present.      Mental Status: She is oriented to person, place, and time.        Results:   CBC:   Recent Labs     10/17/24  1115 10/18/24  0735 10/19/24  0541   WBC 8.6 4.8 5.0   HGB 10.3* 8.7* 9.1*    279 309      BMP:    Recent Labs     10/18/24  0735 10/18/24  1315 10/19/24  0541   * 131* 136   K 3.2* 3.5 3.7   CL 97* 98 101   CO2 24 25 26   BUN 50* 44* 28*   CREATININE 0.9 0.9 0.7   GLUCOSE 101* 165* 97       Hepatic:   Recent Labs     10/17/24  1115   AST 95*   ALT 46*   BILITOT 0.5   ALKPHOS 93      Troponin: No results for input(s): \"TROPONINI\" in the last 72 hours.   BNP: No results for input(s): \"BNP\" in the last 72 hours.   Lipids:   Recent Labs     10/17/24  1655   CHOL 79   HDL 44      ABGs: No results found for: \"PHART\", \"PO2ART\", \"QVR2NCB\"   INR: No results for input(s): \"INR\" in the last 72 hours.      Assessment and plan   Acute kidney injury stage II   SHAREE likely due to decreased effective circulating volume in the setting of decreased oral intake with ongoing infection while on ARB   UA unremarkable, minimal protein in urine, FeNa< 1%  Renal ultrasound unremarkable for acute pathology.  Baseline Cr of 1 mg/dL with initial Cr of 2.7 mg/dL which was also their peak creatinine. Cr now down to 0.7

## 2024-10-19 NOTE — DISCHARGE INSTR - DIET

## 2024-10-19 NOTE — PROGRESS NOTES
Internal Medicine Progress Note    Patient's name: Jacquelin Arreguin  : 1965  Chief complaints (on day of admission): Dizziness and Chills  Admission date: 10/17/2024  Date of service: 10/19/2024   Room: [address] MED SURG  Primary care physician: Javi Lanier MD  Reason for visit: Follow-up for acute kidney injury    Subjective  Jacquelin was seen and examined resting in bed, she awakens to voice. She tells me she is very tired because she has niot been sleeping well in the hospital. She tells me she has been getting intermittent headache and thinks it is because her BP is higher than her normal. We discussed adding hydralazine, she is agreeable. She also tells me she still has a cough, robitussin and cough drops ordered.     Review of Systems  There are no new complaints of chest pain, shortness of breath, abdominal pain, nausea, vomiting, diarrhea, constipation.    Hospital Medications  Current Facility-Administered Medications   Medication Dose Route Frequency Provider Last Rate Last Admin    hydrALAZINE (APRESOLINE) tablet 25 mg  25 mg Oral 3 times per day Airam Elias APRN - CNP        guaiFENesin (ROBITUSSIN) 100 MG/5ML liquid 200 mg  200 mg Oral Q4H PRN Airam Elias APRN - CNP        potassium & sodium phosphates (PHOS-NAK) 280-160-250 MG packet 500 mg  2 packet Oral BID Grey Siu MD   500 mg at 10/18/24 0959    folic acid (FOLVITE) tablet 1 mg  1 mg Oral Daily Krish Otero MD   1 mg at 10/18/24 08    clopidogrel (PLAVIX) tablet 75 mg  75 mg Oral Daily Krish Otero MD   75 mg at 10/18/24 0824    [Held by provider] valsartan (DIOVAN) tablet 160 mg  160 mg Oral Daily Krish Otero MD        baclofen (LIORESAL) tablet 5 mg  5 mg Oral TID Krish Otero MD   5 mg at 10/18/24 1953    pantoprazole (PROTONIX) tablet 40 mg  40 mg Oral QAM AC Krish Otero MD   40 mg at 10/18/24 06    atorvastatin (LIPITOR) tablet 80 mg  80 mg Oral Nightly Krish Otero MD   80 mg at 10/18/24 1954

## 2024-10-19 NOTE — PROGRESS NOTES
Patient refused to have her blood drawn tomorrow morning. States that \"You guys have taken 17 tubes of blood from me and that's more then enough.\" Electronically signed by Ricky Ann RN on 10/18/2024 at 8:36 PM

## 2024-10-24 NOTE — DISCHARGE SUMMARY
Internal Medicine Discharge Summary    NAME: Jacquelin Arreguin :  1965  MRN:  [id number] PCP:Javi Lanier MD    ADMITTED: 10/17/2024   DISCHARGED: 10/19/2024     ADMITTING PHYSICIAN: No att. providers found    CONSULTANT(S):   IP CONSULT TO INTERNAL MEDICINE  IP CONSULT TO NEPHROLOGY  IP CONSULT TO VASCULAR ACCESS TEAM     ADMITTING DIAGNOSIS:   Dehydration [E86.0]  SHAREE (acute kidney injury) (HCC) [N17.9]  Acute renal failure, unspecified acute renal failure type (HCC) [N17.9]     DISCHARGE DIAGNOSES:   Patient Active Problem List:     Acute ischemic cerebrovascular accident (CVA) involving internal carotid artery territory (HCC)     Stroke-like episode     Anemia, unspecified     Peripheral vascular disease (HCC)     Subclavian artery stenosis, right (HCC)     Leg swelling     Lymphedema of both lower extremities     Decreased dorsalis pedis pulse     H/O: CVA (cerebrovascular accident)     Bilateral carotid artery stenosis     SHAREE (acute kidney injury) (HCC)     Asthma     CAD (coronary artery disease)     Hypertension      BRIEF HISTORY OF PRESENT ILLNESS: Jacquelin Arreguin is a 59 y.o. female patient of Javi Lanier MD who  has a past medical history of Asthma, Bilateral carotid artery stenosis, CAD (coronary artery disease), CVA (cerebral vascular accident) (HCC), Decreased dorsalis pedis pulse, Environmental allergies, Fracture, H/O: CVA (cerebrovascular accident), Heart palpitations, Hypertension, Kidney infection, Leg swelling, Lymphedema of both lower extremities, MI (myocardial infarction) (HCC), and Syncope and collapse. who originally had concerns including Dizziness and Chills. at presentation on 10/17/2024, and was found to have Dehydration [E86.0]  SHAREE (acute kidney injury) (HCC) [N17.9]  Acute renal failure, unspecified acute renal failure type (HCC) [N17.9] after workup.    HOSPITAL COURSE: The patient was admitted   Plan  Acute kidney injury  Follow labs and imaging   IV fluids